# Patient Record
Sex: MALE | Race: BLACK OR AFRICAN AMERICAN | NOT HISPANIC OR LATINO | Employment: OTHER | URBAN - METROPOLITAN AREA
[De-identification: names, ages, dates, MRNs, and addresses within clinical notes are randomized per-mention and may not be internally consistent; named-entity substitution may affect disease eponyms.]

---

## 2020-06-08 ENCOUNTER — OFFICE VISIT (OUTPATIENT)
Dept: PODIATRY | Facility: CLINIC | Age: 80
End: 2020-06-08
Payer: MEDICARE

## 2020-06-08 VITALS
WEIGHT: 171.5 LBS | HEIGHT: 70 IN | SYSTOLIC BLOOD PRESSURE: 173 MMHG | DIASTOLIC BLOOD PRESSURE: 96 MMHG | BODY MASS INDEX: 24.55 KG/M2 | HEART RATE: 63 BPM

## 2020-06-08 DIAGNOSIS — L84 HELOMA MOLLE: ICD-10-CM

## 2020-06-08 DIAGNOSIS — B35.3 TINEA PEDIS OF RIGHT FOOT: Primary | ICD-10-CM

## 2020-06-08 PROCEDURE — 99999 PR PBB SHADOW E&M-NEW PATIENT-LVL III: CPT | Mod: PBBFAC,,, | Performed by: PODIATRIST

## 2020-06-08 PROCEDURE — 99203 PR OFFICE/OUTPT VISIT, NEW, LEVL III, 30-44 MIN: ICD-10-PCS | Mod: S$PBB,,, | Performed by: PODIATRIST

## 2020-06-08 PROCEDURE — 99999 PR PBB SHADOW E&M-NEW PATIENT-LVL III: ICD-10-PCS | Mod: PBBFAC,,, | Performed by: PODIATRIST

## 2020-06-08 PROCEDURE — 99203 OFFICE O/P NEW LOW 30 MIN: CPT | Mod: S$PBB,,, | Performed by: PODIATRIST

## 2020-06-08 PROCEDURE — 99203 OFFICE O/P NEW LOW 30 MIN: CPT | Mod: PBBFAC | Performed by: PODIATRIST

## 2020-06-08 RX ORDER — CARVEDILOL 12.5 MG/1
TABLET ORAL
COMMUNITY
Start: 2020-05-26 | End: 2022-07-27 | Stop reason: SDUPTHER

## 2020-06-08 RX ORDER — SILDENAFIL 100 MG/1
100 TABLET, FILM COATED ORAL
COMMUNITY
Start: 2018-09-08 | End: 2022-12-22

## 2020-06-08 RX ORDER — OLMESARTAN MEDOXOMIL 40 MG/1
20 TABLET ORAL DAILY
COMMUNITY
Start: 2020-04-03

## 2020-06-08 RX ORDER — SPIRONOLACTONE 25 MG/1
TABLET ORAL
COMMUNITY
Start: 2020-05-26 | End: 2022-12-22

## 2020-06-08 RX ORDER — ZOLPIDEM TARTRATE 5 MG/1
TABLET ORAL
COMMUNITY
Start: 2020-05-26 | End: 2023-03-09 | Stop reason: CLARIF

## 2020-06-08 NOTE — PATIENT INSTRUCTIONS
Instructions:  Make sure to dry in between your toes after shower/bath daily    Apply Betadine in between RIGHT THIRD AND FOURTH WEBSPACE ONCE DAILY FOR 7 DAYS    THEN APPLY MICONAZOLE ANTIFUNGAL POWDER every day on bottom of feet and in between toe webspaces to keep dry    WASH YOUR FEET WHEN SHOWERING WITH ANTIBACTERIAL (DIAL) SOAP  Wear cotton socks (80%) or open shoes and sandals.  KEEP YOUR FEET DRY!    Sincerely,  Dr. Charmaine Da Silva        Athletes Foot    Athletes Foot is caused by a fungal infection in the skin. It affects the skin between the toes where it causes fissures (cracks in the skin). It can also affect the bottom of the foot where it causes dry white scales and peeling of the skin. This infection is more likely to occur when the foot is in hot, sweaty socks and shoes for long periods of time.  This infection is treated with skin creams or oral medicine.  Home Care:  · It is important to keep the feet dry. Use absorbent cotton socks and change them if they become sweaty; or wear an open-toe shoe or sandal. Wash the feet at least once a day with soap and water.  · Apply the antifungal cream as prescribed. Some antifungal creams are available without a prescription (Lotrimin, Tinactin).  · It may take a week before the rash starts to improve and it can take about three to four weeks to completely clear. Continue the medicine until the rash is all gone.  · Use over-the-counter antifungal powders or sprays on your feet after exposure to high-risk environments (public showers, gyms and locker rooms) may prevent future infections. You may wish to use appropriate footwear to reduce exposure.  Follow Up  with your doctor as recommended by our staff if the rash is not starting to improve after TEN days of treatment, or if the rash continues to spread.  Get Prompt Medical Attention  if any of the following occur:  · Increasing redness or swelling of the foot  · Pus draining from cracks in the  skin  · Fever of 100.4ºF (38ºC) or higher, or as directed by your healthcare provider  © 0276-4551 [employer], [address] [place] [address], [place] [address]. All rights reserved. This information is not intended as a substitute for professional medical care. Always follow your healthcare professional's instructions.

## 2020-06-08 NOTE — PROGRESS NOTES
[facility]  PODIATRIC MEDICINE AND SURGERY      CHIEF COMPLAINT   Chief Complaint   Patient presents with    Foot Ulcer     Poss. Ulcer in the 3rd web space on the right foot.          HPI:    Edmund Chu is a 79 y.o. male presenting to podiatry clinic with complaint of pain 3rd interspace right foot.  Symptoms have been present for several weeks.  The patient has tried over the counter medications with some success, but the problem is recurrent and aggravating. Patient inquires about available treatment options. No further pedal complaints.      PMH  Past Medical History:   Diagnosis Date    CHF (congestive heart failure)     Hypertension        PROBLEM LIST  There are no active problems to display for this patient.      MEDS  Current Outpatient Medications on File Prior to Visit   Medication Sig Dispense Refill    carvediloL (COREG) 12.5 MG tablet       olmesartan (BENICAR) 40 MG tablet       sildenafiL (VIAGRA) 100 MG tablet Take 100 mg by mouth.      spironolactone (ALDACTONE) 25 MG tablet       zolpidem (AMBIEN) 5 MG Tab        No current facility-administered medications on file prior to visit.        PSH     Past Surgical History:   Procedure Laterality Date    AORTIC VALVE REPLACEMENT  2016    colon section removal          ALL  Review of patient's allergies indicates:   Allergen Reactions    Amlodipine Edema     Ankle    Lactose Other (See Comments)     GI distress    Lisinopril Other (See Comments)    Mefloquine Hives and Itching     Anti malarial         SOC     Social History     Tobacco Use    Smoking status: Former Smoker   Substance Use Topics    Alcohol use: Not on file    Drug use: Not on file         Family HX  No family history on file.         REVIEW OF SYSTEMS  General: Denies any fever or chills  Chest: Denies shortness of breath, wheezing, coughing, or sputum production  Heart: Denies chest pain, cold extremities, orthopenia, or reduced exercise  "tolerance  As noted above and per history of current illness above, otherwise negative in the remainder of the 14 systems.      PHYSICAL EXAM:      Vitals:    06/08/20 1407   BP: (!) 173/96   Pulse: 63   Weight: 77.8 kg (171 lb 8.3 oz)   Height: 5' 10" (1.778 m)       General: This patient is well-developed, well-nourished and appears stated age, well-oriented to person, place and time, and cooperative and pleasant on today's visit    LOWER EXTREMITY PHYSICAL EXAM  VASCULAR  Dorsalis pedis and posterior tibial pulses palpable 2/4 bilaterally.   Capillary refill time immediate to the toes.   Feet are warm to the touch. Skin temperature warm to warm from proximally to distally   There are no ecchymoses noted to bilateral foot and ankle regions.     DERMATOLOGIC  Skin moist with healthy texture and turgor.  There are no open ulcerations, lacerations, or fissures to bilateral foot and ankle regions. There are no signs of infection as there is no erythema, no proximal-extending lymphangiitis, no fluctuance, or crepitus noted on palpation to bilateral foot and ankle regions.   There is RIGHT third and fourth interdigital maceration.   There are hyperkeratotic lesions noted medial aspect fourth digit RIGHT foot.     NEUROLOGIC  Epicritic sensation is intact as the patient is able to sense light touch to bilateral foot and ankle regions.   Achilles and patellar deep tendon reflexes intact  Babinski reflex absent    ORTHOPEDIC/BIOMECHANICAL  No symptomatic structural abnormalities noted  Muscle strength AT/EHL/EDL/PT: 5/5; Achilles/Gastroc/Soleus: 5/5; PB/PL: 5/5 Muscle tone is normal.  Ankle joint ROM INTACT DF/PF, non-crepitus      ASSESSMENT   Tinea pedis of right foot    Heloma molle - Right Foot        PLAN    1. Patient was educated about clinical and imaging findings, and verbalizes understanding of above.  2.For the athletes foot and maceration, the site was lightly debrided with tissue nipper. Gentian violet " applied. Pt instructed to apply betadine between the toes and to the bottoms of feet twice a day. In addition, recommendations were made to spray and disinfect shoes with Lysol and to alternate shoes. Also, if excessive sweating patient was instructed to use Arid Extra Dry spray on the bottom of the feet with Zeasorb powder in the digital interspaces.    3. RTC  for follow up/evaluation as scheduled      Report Electronically Signed By:     Charmaine Da Silva DPM   Podiatry  [facility]  6/8/2020

## 2021-07-15 ENCOUNTER — OFFICE VISIT (OUTPATIENT)
Dept: DERMATOLOGY | Facility: CLINIC | Age: 81
End: 2021-07-15
Payer: MEDICARE

## 2021-07-15 DIAGNOSIS — L30.9 DERMATITIS: Primary | ICD-10-CM

## 2021-07-15 DIAGNOSIS — L82.1 SEBORRHEIC KERATOSES: ICD-10-CM

## 2021-07-15 PROCEDURE — 99999 PR PBB SHADOW E&M-EST. PATIENT-LVL II: CPT | Mod: PBBFAC,,, | Performed by: STUDENT IN AN ORGANIZED HEALTH CARE EDUCATION/TRAINING PROGRAM

## 2021-07-15 PROCEDURE — 99212 OFFICE O/P EST SF 10 MIN: CPT | Mod: PBBFAC | Performed by: STUDENT IN AN ORGANIZED HEALTH CARE EDUCATION/TRAINING PROGRAM

## 2021-07-15 PROCEDURE — 99202 PR OFFICE/OUTPT VISIT, NEW, LEVL II, 15-29 MIN: ICD-10-PCS | Mod: S$PBB,,, | Performed by: STUDENT IN AN ORGANIZED HEALTH CARE EDUCATION/TRAINING PROGRAM

## 2021-07-15 PROCEDURE — 99999 PR PBB SHADOW E&M-EST. PATIENT-LVL II: ICD-10-PCS | Mod: PBBFAC,,, | Performed by: STUDENT IN AN ORGANIZED HEALTH CARE EDUCATION/TRAINING PROGRAM

## 2021-07-15 PROCEDURE — 99202 OFFICE O/P NEW SF 15 MIN: CPT | Mod: S$PBB,,, | Performed by: STUDENT IN AN ORGANIZED HEALTH CARE EDUCATION/TRAINING PROGRAM

## 2021-07-15 RX ORDER — TRIAMCINOLONE ACETONIDE 1 MG/G
CREAM TOPICAL
Qty: 454 G | Refills: 1 | Status: SHIPPED | OUTPATIENT
Start: 2021-07-15 | End: 2022-12-22

## 2022-07-26 DIAGNOSIS — Z76.89 ESTABLISHING CARE WITH NEW DOCTOR, ENCOUNTER FOR: ICD-10-CM

## 2022-07-26 DIAGNOSIS — I50.9 CONGESTIVE HEART FAILURE, UNSPECIFIED HF CHRONICITY, UNSPECIFIED HEART FAILURE TYPE: Primary | ICD-10-CM

## 2022-07-27 ENCOUNTER — HOSPITAL ENCOUNTER (OUTPATIENT)
Dept: CARDIOLOGY | Facility: HOSPITAL | Age: 82
Discharge: HOME | End: 2022-07-27
Attending: STUDENT IN AN ORGANIZED HEALTH CARE EDUCATION/TRAINING PROGRAM
Payer: MEDICARE

## 2022-07-27 ENCOUNTER — OFFICE VISIT (OUTPATIENT)
Dept: CARDIOLOGY | Facility: CLINIC | Age: 82
End: 2022-07-27
Payer: MEDICARE

## 2022-07-27 VITALS
HEART RATE: 81 BPM | HEIGHT: 69 IN | WEIGHT: 163.13 LBS | DIASTOLIC BLOOD PRESSURE: 72 MMHG | BODY MASS INDEX: 24.16 KG/M2 | OXYGEN SATURATION: 99 % | SYSTOLIC BLOOD PRESSURE: 156 MMHG

## 2022-07-27 DIAGNOSIS — I50.9 CONGESTIVE HEART FAILURE, UNSPECIFIED HF CHRONICITY, UNSPECIFIED HEART FAILURE TYPE: ICD-10-CM

## 2022-07-27 DIAGNOSIS — Z85.038 HISTORY OF COLON CANCER: ICD-10-CM

## 2022-07-27 DIAGNOSIS — Z95.2 HISTORY OF AORTIC VALVE REPLACEMENT: ICD-10-CM

## 2022-07-27 DIAGNOSIS — I48.0 PAF (PAROXYSMAL ATRIAL FIBRILLATION): ICD-10-CM

## 2022-07-27 DIAGNOSIS — Z76.89 ESTABLISHING CARE WITH NEW DOCTOR, ENCOUNTER FOR: ICD-10-CM

## 2022-07-27 DIAGNOSIS — I50.9 CONGESTIVE HEART FAILURE, UNSPECIFIED HF CHRONICITY, UNSPECIFIED HEART FAILURE TYPE: Primary | ICD-10-CM

## 2022-07-27 DIAGNOSIS — I10 HYPERTENSION, UNSPECIFIED TYPE: ICD-10-CM

## 2022-07-27 PROCEDURE — 99214 PR OFFICE/OUTPT VISIT, EST, LEVL IV, 30-39 MIN: ICD-10-PCS | Mod: S$PBB,,, | Performed by: STUDENT IN AN ORGANIZED HEALTH CARE EDUCATION/TRAINING PROGRAM

## 2022-07-27 PROCEDURE — 99999 PR PBB SHADOW E&M-EST. PATIENT-LVL III: CPT | Mod: PBBFAC,,, | Performed by: STUDENT IN AN ORGANIZED HEALTH CARE EDUCATION/TRAINING PROGRAM

## 2022-07-27 PROCEDURE — 99214 OFFICE O/P EST MOD 30 MIN: CPT | Mod: S$PBB,,, | Performed by: STUDENT IN AN ORGANIZED HEALTH CARE EDUCATION/TRAINING PROGRAM

## 2022-07-27 PROCEDURE — 99999 PR PBB SHADOW E&M-EST. PATIENT-LVL III: ICD-10-PCS | Mod: PBBFAC,,, | Performed by: STUDENT IN AN ORGANIZED HEALTH CARE EDUCATION/TRAINING PROGRAM

## 2022-07-27 PROCEDURE — 99213 OFFICE O/P EST LOW 20 MIN: CPT | Mod: PBBFAC | Performed by: STUDENT IN AN ORGANIZED HEALTH CARE EDUCATION/TRAINING PROGRAM

## 2022-07-27 RX ORDER — CARVEDILOL 12.5 MG/1
25 TABLET ORAL 2 TIMES DAILY WITH MEALS
Qty: 60 TABLET | Refills: 11 | Status: SHIPPED | OUTPATIENT
Start: 2022-07-27 | End: 2023-03-09

## 2022-07-27 RX ORDER — TORSEMIDE 20 MG/1
20 TABLET ORAL DAILY
COMMUNITY
End: 2022-10-20

## 2022-07-27 RX ORDER — SACUBITRIL AND VALSARTAN 49; 51 MG/1; MG/1
1 TABLET, FILM COATED ORAL 2 TIMES DAILY
COMMUNITY
End: 2022-08-31

## 2022-07-27 RX ORDER — LORAZEPAM 0.5 MG/1
1 TABLET ORAL 2 TIMES DAILY
COMMUNITY
Start: 2022-07-07

## 2022-07-27 NOTE — PROGRESS NOTES
Section of Cardiology                  Cardiac Clinic Note    Chief Complaint/Reason for consultation:  Establish care      HPI:   Edmund Chu is a 81 y.o. male with h/o hypertension, CHF, AVR 2016 ([facility]), h/o colon cancerwho comes in to Cardiology Clinic to establish care.    7/27/2022  Per Care everywhere, last echocardiogram March 2021 with redo use LV function, right heart failure, moderate mitral regurgitation, normal functioning bioprosthetic aortic valve    Comes in with wife   Only coming in for ICD evaluation   Still seeing Dr. Viveros  Has had CHF since 2009- did not want ICD until now    Aortic valve replacement, no aspirin due to mild bruising     Reports heart issues since 2009- had colon cancer s/p resection     Walks regularly, 1.2 miles daily, worsening SOB  Recent orthopnea last few days  Reports leg swelling- recently needed to take torsemide which he doesn't take it regularly   He's a vegetarian, does not use salt  Trying to watch fluid intake     Family hx: cousin: ICD, afib   Stopped smoking at age of 32 yo. ETOH occ          EKG 7/27/2022 NSR, RBBB, LAFB, inferior infarct, qtc 529 ms          ECHO  3/21 (Care everywhere)  CONCLUSIONS:   - Exam indication: Initial evaluation of known cardiomyopathy   - The left ventricle is mildly dilated. There is left ventricular hypertrophy.   Left ventricular systolic function is severely decreased. EF = 26 ± 5% (2D 4-ch.)   Definity contrast used for endocardial border detection.   - The right ventricle is dilated. Right ventricular systolic function is   moderately decreased.   - The left atrial cavity is severely dilated.   - The right atrial cavity is dilated.   - There is moderate (2+ - 3+) mitral valve regurgitation due to restricted leaflet    motion and apical tethering of normal mitral leaflet caused by LV enlargement.   Regurgitant orifice area (PISA) is 0.24 cm².   - Porcine prosthetic aortic valve. There is trace aortic  valve regurgitation. The   peak gradient is 10 mmHg, the mean gradient is 5 mmHg and the dimensionless valve   index is 0.31.   - Estimated right ventricular systolic pressure is 42 mmHg plus right atrial   pressure. Estimated right atrial pressure is not included as the IVC was not seen.   - The patient has not had a prior CC echocardiographic exam for comparison.       STRESS TEST    [facility]      ROS: All 10 systems reviewed. Please refer to the HPI for pertinent positives. All other systems negative.     Past Medical History  Past Medical History:   Diagnosis Date    CHF (congestive heart failure)     Hypertension        Surgical History  Past Surgical History:   Procedure Laterality Date    AORTIC VALVE REPLACEMENT  2016    colon section removal            Allergies:   Review of patient's allergies indicates:   Allergen Reactions    Sacubitril-valsartan Hives and Swelling    Amlodipine Edema     Ankle    Lactose Other (See Comments)     GI distress    Lisinopril Other (See Comments)    Mefloquine Hives and Itching     Anti malarial         Social History:  Social History     Socioeconomic History    Marital status:    Tobacco Use    Smoking status: Former Smoker    Smokeless tobacco: Never Used   Substance and Sexual Activity    Alcohol use: Yes    Drug use: Never       Family History:  family history includes Colon cancer in his father; Hypertension in his mother.    Home Medications:  Current Outpatient Medications on File Prior to Visit   Medication Sig Dispense Refill    olmesartan (BENICAR) 40 MG tablet       torsemide (DEMADEX) 20 MG Tab Take 20 mg by mouth once daily.      [DISCONTINUED] carvediloL (COREG) 12.5 MG tablet       LORazepam (ATIVAN) 0.5 MG tablet Take 0.5 mg by mouth 2 (two) times daily.      rivaroxaban (XARELTO) 20 mg Tab Take 20 mg by mouth daily with dinner or evening meal.      sacubitriL-valsartan (ENTRESTO) 49-51 mg per tablet Take 1 tablet by mouth 2 (two) times  "daily.      sildenafiL (VIAGRA) 100 MG tablet Take 100 mg by mouth.      spironolactone (ALDACTONE) 25 MG tablet       triamcinolone acetonide 0.1% (KENALOG) 0.1 % cream AAA bid (Patient not taking: Reported on 7/27/2022) 454 g 1    zolpidem (AMBIEN) 5 MG Tab        No current facility-administered medications on file prior to visit.       Physical exam:  BP (!) 156/72 (BP Location: Right arm, Patient Position: Sitting, BP Method: Medium (Manual))   Pulse 81   Ht 5' 9" (1.753 m)   Wt 74 kg (163 lb 2.3 oz)   SpO2 99%   BMI 24.09 kg/m²         General: Pt is a 81 y.o. year old male who is AAOx3, in NAD, is pleasant, well nourished, looks stated age  HEENT: PERRL, EOMI, Oral mucosa pink & moist  CVS  No abnormal cardiac pulsations noted on inspection. JVP not raised. The apical impulse is normal on palpation, and is located in the left 5th intercostal space in the mid - clavicular line. No palpable thrills or abnormal pulsations noted. RR, S1 - S2 heard, no murmurs, rubs or gallops appreciated.   PUL : CTA B/L. No wheezes/crackles heard   ABD : BS +, soft. No tenderness elicited   LE : No C/C/E. Distal Pulses palpable B/L         LABS:    Chemistry: No results found for: NA, K, CL, CO2, BUN, CREATININE, GLUCOSE, CALCIUM  Cardiac Markers: No results found for: CKTOTAL, CKMB, CKMBINDEX, TROPONINI  Cardiac Markers (Last 3): No results found for: CKTOTAL, CKMB, CKMBINDEX, TROPONINI  CBC: No results found for: WBC, HGB, HCT, MCV, PLT  Lipids: No results found for: CHOL, TRIG, HDL, LDLDIRECT  Coagulation: No results found for: PT, INR, APTT        Assessment      1. Congestive heart failure, unspecified HF chronicity, unspecified heart failure type    2. Establishing care with new doctor, encounter for    3. Hypertension, unspecified type    4. History of aortic valve replacement    5. History of colon cancer    6. PAF (paroxysmal atrial fibrillation)         Plan:    Congestive heart failure  Systolic heart failure " with right ventricular failure  Reports SOB  Obtain echocardiogram  Continue carvedilol, Benicar, spironolactone  Stopped entresto - due to avoiding over medication  Continue torsemide   Low salt diet, daily weights, low salt diet   Obtain BnP  Needs records from Dr. Viveros (has some records today, will upload)  Refer to Dr. Grider for ICD/CRTD    Aortic valve replacement  Normally function per last echo in 2021  Repeat echocardiogram    ?PAF  Reports history  No longer taking xarelto - reports cardioversion     Hypertension  Elevated  Increase carvedilol 25 mg b.i.d.  Continue Benicar, spironolactone        This note was prepared using voice recognition system and is likely to have sound alike errors that may have been overlooked even after proofreading.     I have reviewed all pertinent chart information.  Plans and recommendations have been formulated under my direct supervision. All questions answered and patient voiced understanding.   If symptoms persist go to the ED.    RTC in 1 month         Tere Johns MD  Cardiology

## 2022-07-29 DIAGNOSIS — I50.9 CONGESTIVE HEART FAILURE, UNSPECIFIED HF CHRONICITY, UNSPECIFIED HEART FAILURE TYPE: Primary | ICD-10-CM

## 2022-08-05 ENCOUNTER — LAB VISIT (OUTPATIENT)
Dept: LAB | Facility: HOSPITAL | Age: 82
End: 2022-08-05
Attending: STUDENT IN AN ORGANIZED HEALTH CARE EDUCATION/TRAINING PROGRAM
Payer: MEDICARE

## 2022-08-05 DIAGNOSIS — I50.9 CONGESTIVE HEART FAILURE, UNSPECIFIED HF CHRONICITY, UNSPECIFIED HEART FAILURE TYPE: ICD-10-CM

## 2022-08-05 LAB
ALBUMIN SERPL BCP-MCNC: 4 G/DL (ref 3.5–5.2)
ALP SERPL-CCNC: 77 U/L (ref 55–135)
ALT SERPL W/O P-5'-P-CCNC: 17 U/L (ref 10–44)
ANION GAP SERPL CALC-SCNC: 10 MMOL/L (ref 8–16)
AST SERPL-CCNC: 25 U/L (ref 10–40)
BILIRUB SERPL-MCNC: 1 MG/DL (ref 0.1–1)
BNP SERPL-MCNC: 1369 PG/ML (ref 0–99)
BUN SERPL-MCNC: 27 MG/DL (ref 8–23)
CALCIUM SERPL-MCNC: 9.6 MG/DL (ref 8.7–10.5)
CHLORIDE SERPL-SCNC: 95 MMOL/L (ref 95–110)
CO2 SERPL-SCNC: 30 MMOL/L (ref 23–29)
CREAT SERPL-MCNC: 1.5 MG/DL (ref 0.5–1.4)
EST. GFR  (NO RACE VARIABLE): 47.1 ML/MIN/1.73 M^2
GLUCOSE SERPL-MCNC: 97 MG/DL (ref 70–110)
POTASSIUM SERPL-SCNC: 4.7 MMOL/L (ref 3.5–5.1)
PROT SERPL-MCNC: 7.5 G/DL (ref 6–8.4)
SODIUM SERPL-SCNC: 135 MMOL/L (ref 136–145)

## 2022-08-05 PROCEDURE — 36415 COLL VENOUS BLD VENIPUNCTURE: CPT | Performed by: STUDENT IN AN ORGANIZED HEALTH CARE EDUCATION/TRAINING PROGRAM

## 2022-08-05 PROCEDURE — 83880 ASSAY OF NATRIURETIC PEPTIDE: CPT | Performed by: STUDENT IN AN ORGANIZED HEALTH CARE EDUCATION/TRAINING PROGRAM

## 2022-08-05 PROCEDURE — 80053 COMPREHEN METABOLIC PANEL: CPT | Performed by: STUDENT IN AN ORGANIZED HEALTH CARE EDUCATION/TRAINING PROGRAM

## 2022-08-09 ENCOUNTER — TELEPHONE (OUTPATIENT)
Dept: CARDIOLOGY | Facility: CLINIC | Age: 82
End: 2022-08-09
Payer: MEDICARE

## 2022-08-09 DIAGNOSIS — I10 HYPERTENSION, UNSPECIFIED TYPE: Primary | ICD-10-CM

## 2022-08-09 DIAGNOSIS — I50.9 CONGESTIVE HEART FAILURE, UNSPECIFIED HF CHRONICITY, UNSPECIFIED HEART FAILURE TYPE: ICD-10-CM

## 2022-08-09 NOTE — TELEPHONE ENCOUNTER
Spoke with patient gave lab work and scheduled lab work. Pt verbalized understanding.     ----- Message from Tere Johns MD sent at 8/8/2022  7:04 AM CDT -----  Call patient  Fluid level is coming down  Potassium is improving  Continue torsemide 2 tablets francisco  Recheck CMP and BNP around 8/17

## 2022-08-17 ENCOUNTER — LAB VISIT (OUTPATIENT)
Dept: LAB | Facility: HOSPITAL | Age: 82
End: 2022-08-17
Attending: STUDENT IN AN ORGANIZED HEALTH CARE EDUCATION/TRAINING PROGRAM
Payer: MEDICARE

## 2022-08-17 DIAGNOSIS — I10 HYPERTENSION, UNSPECIFIED TYPE: ICD-10-CM

## 2022-08-17 DIAGNOSIS — I50.9 CONGESTIVE HEART FAILURE, UNSPECIFIED HF CHRONICITY, UNSPECIFIED HEART FAILURE TYPE: ICD-10-CM

## 2022-08-17 LAB
ALBUMIN SERPL BCP-MCNC: 4 G/DL (ref 3.5–5.2)
ALP SERPL-CCNC: 83 U/L (ref 55–135)
ALT SERPL W/O P-5'-P-CCNC: 20 U/L (ref 10–44)
ANION GAP SERPL CALC-SCNC: 12 MMOL/L (ref 8–16)
AST SERPL-CCNC: 28 U/L (ref 10–40)
BILIRUB SERPL-MCNC: 1 MG/DL (ref 0.1–1)
BNP SERPL-MCNC: 1441 PG/ML (ref 0–99)
BUN SERPL-MCNC: 25 MG/DL (ref 8–23)
CALCIUM SERPL-MCNC: 10 MG/DL (ref 8.7–10.5)
CHLORIDE SERPL-SCNC: 98 MMOL/L (ref 95–110)
CO2 SERPL-SCNC: 28 MMOL/L (ref 23–29)
CREAT SERPL-MCNC: 1.6 MG/DL (ref 0.5–1.4)
EST. GFR  (NO RACE VARIABLE): 43.6 ML/MIN/1.73 M^2
GLUCOSE SERPL-MCNC: 77 MG/DL (ref 70–110)
POTASSIUM SERPL-SCNC: 4.7 MMOL/L (ref 3.5–5.1)
PROT SERPL-MCNC: 8 G/DL (ref 6–8.4)
SODIUM SERPL-SCNC: 138 MMOL/L (ref 136–145)

## 2022-08-17 PROCEDURE — 83880 ASSAY OF NATRIURETIC PEPTIDE: CPT | Performed by: STUDENT IN AN ORGANIZED HEALTH CARE EDUCATION/TRAINING PROGRAM

## 2022-08-17 PROCEDURE — 80053 COMPREHEN METABOLIC PANEL: CPT | Performed by: STUDENT IN AN ORGANIZED HEALTH CARE EDUCATION/TRAINING PROGRAM

## 2022-08-17 PROCEDURE — 36415 COLL VENOUS BLD VENIPUNCTURE: CPT | Mod: PO | Performed by: STUDENT IN AN ORGANIZED HEALTH CARE EDUCATION/TRAINING PROGRAM

## 2022-08-19 ENCOUNTER — TELEPHONE (OUTPATIENT)
Dept: CARDIOLOGY | Facility: CLINIC | Age: 82
End: 2022-08-19
Payer: MEDICARE

## 2022-08-19 NOTE — TELEPHONE ENCOUNTER
Spoke with patient gave lab results. He still has some SOB bending over to pick something up. He also states he is able to walk further.      ----- Message from Tere Johns MD sent at 8/19/2022  6:58 AM CDT -----  Call patient  Fluid level is mildly up compared to prior   Other labs are fine  Please ask him if symptoms have improved, stabilized, or worsened

## 2022-08-19 NOTE — PROGRESS NOTES
Call patient  Fluid level is mildly up compared to prior   Other labs are fine  Please ask him if symptoms have improved, stabilized, or worsened

## 2022-08-31 ENCOUNTER — OFFICE VISIT (OUTPATIENT)
Dept: CARDIOLOGY | Facility: CLINIC | Age: 82
End: 2022-08-31
Payer: MEDICARE

## 2022-08-31 ENCOUNTER — HOSPITAL ENCOUNTER (OUTPATIENT)
Dept: CARDIOLOGY | Facility: HOSPITAL | Age: 82
Discharge: HOME | End: 2022-08-31
Attending: STUDENT IN AN ORGANIZED HEALTH CARE EDUCATION/TRAINING PROGRAM
Payer: MEDICARE

## 2022-08-31 VITALS
HEART RATE: 70 BPM | SYSTOLIC BLOOD PRESSURE: 140 MMHG | WEIGHT: 157.63 LBS | OXYGEN SATURATION: 99 % | DIASTOLIC BLOOD PRESSURE: 80 MMHG | HEIGHT: 69 IN | BODY MASS INDEX: 23.35 KG/M2

## 2022-08-31 DIAGNOSIS — I10 WHITE COAT SYNDROME WITH DIAGNOSIS OF HYPERTENSION: ICD-10-CM

## 2022-08-31 DIAGNOSIS — I50.42 CHRONIC COMBINED SYSTOLIC AND DIASTOLIC CONGESTIVE HEART FAILURE: ICD-10-CM

## 2022-08-31 DIAGNOSIS — I10 HYPERTENSION, UNSPECIFIED TYPE: Primary | ICD-10-CM

## 2022-08-31 DIAGNOSIS — I48.0 PAF (PAROXYSMAL ATRIAL FIBRILLATION): ICD-10-CM

## 2022-08-31 DIAGNOSIS — I25.10 CORONARY ARTERY DISEASE INVOLVING NATIVE CORONARY ARTERY OF NATIVE HEART WITHOUT ANGINA PECTORIS: ICD-10-CM

## 2022-08-31 DIAGNOSIS — Z95.2 HISTORY OF AORTIC VALVE REPLACEMENT: ICD-10-CM

## 2022-08-31 DIAGNOSIS — I10 HYPERTENSION, UNSPECIFIED TYPE: ICD-10-CM

## 2022-08-31 PROCEDURE — 99999 PR PBB SHADOW E&M-EST. PATIENT-LVL IV: CPT | Mod: PBBFAC,,, | Performed by: STUDENT IN AN ORGANIZED HEALTH CARE EDUCATION/TRAINING PROGRAM

## 2022-08-31 PROCEDURE — 93010 EKG 12-LEAD: ICD-10-PCS | Mod: ,,, | Performed by: INTERNAL MEDICINE

## 2022-08-31 PROCEDURE — 99214 PR OFFICE/OUTPT VISIT, EST, LEVL IV, 30-39 MIN: ICD-10-PCS | Mod: S$PBB,,, | Performed by: STUDENT IN AN ORGANIZED HEALTH CARE EDUCATION/TRAINING PROGRAM

## 2022-08-31 PROCEDURE — 99214 OFFICE O/P EST MOD 30 MIN: CPT | Mod: PBBFAC | Performed by: STUDENT IN AN ORGANIZED HEALTH CARE EDUCATION/TRAINING PROGRAM

## 2022-08-31 PROCEDURE — 99999 PR PBB SHADOW E&M-EST. PATIENT-LVL IV: ICD-10-PCS | Mod: PBBFAC,,, | Performed by: STUDENT IN AN ORGANIZED HEALTH CARE EDUCATION/TRAINING PROGRAM

## 2022-08-31 PROCEDURE — 93005 ELECTROCARDIOGRAM TRACING: CPT

## 2022-08-31 PROCEDURE — 99214 OFFICE O/P EST MOD 30 MIN: CPT | Mod: S$PBB,,, | Performed by: STUDENT IN AN ORGANIZED HEALTH CARE EDUCATION/TRAINING PROGRAM

## 2022-08-31 PROCEDURE — 93010 ELECTROCARDIOGRAM REPORT: CPT | Mod: ,,, | Performed by: INTERNAL MEDICINE

## 2022-08-31 NOTE — PROGRESS NOTES
Section of Cardiology                  Cardiac Clinic Note    Chief Complaint/Reason for consultation:  Establish care      HPI:   Edmund Chu is a 81 y.o. male with h/o hypertension, CHF, AVR 2016 ([facility]), h/o colon cancerwho comes in to Cardiology Clinic to establish care.    7/27/2022  Per Care everywhere, last echocardiogram March 2021 with redo use LV function, right heart failure, moderate mitral regurgitation, normal functioning bioprosthetic aortic valve    Comes in with wife   Only coming in for ICD evaluation   Still seeing Dr. Viveros  Has had CHF since 2009- did not want ICD until now    Aortic valve replacement, no aspirin due to mild bruising     Reports heart issues since 2009- had colon cancer s/p resection     Walks regularly, 1.2 miles daily, worsening SOB  Recent orthopnea last few days  Reports leg swelling- recently needed to take torsemide which he doesn't take it regularly   He's a vegetarian, does not use salt  Trying to watch fluid intake     Family hx: cousin: ICD, afib   Stopped smoking at age of 32 yo. ETOH occ    8/31/22  BNP level was elevated, torsemide 100 mg daily increased to 200 mg daily at the time  Repeat BNP level was elevated, CMP showed stable renal function  Patient now back down to 100 mg   Dropped 6lbs since last visit  Reports daily weights, fluid and salt restriction  Has not taken Xarelto inferior of bleeding if he cuts himself  Blood pressure readings normotensive at home ranges a 110 to 135 systolic    Denies chest pain, shortness of breath, dizziness, palpitations, orthopnea, PND        EKG 8/31/2022 NSR, RBBB, LAFB, PVCs,  ms   EKG 7/27/2022 NSR, RBBB, LAFB, inferior infarct, qtc 529 ms          Prior records  Stress test 12/2020 small reversible defect, apical segment.  Medium perfusion defect inferior wall  LHC 2/21:  Left main 20% stenosis   Lad:  Mid LAD 50% stenosis   Left circumflex: Diffuse irregularity  Mid RCA proximal portion  50% stenosis  Distal RCA 50% stenosis   Ramus diffuse irregularity  Echo 11/21 EF 30%, RV function is severely decreased        ECHO  3/21 (Care everywhere)  CONCLUSIONS:   - Exam indication: Initial evaluation of known cardiomyopathy   - The left ventricle is mildly dilated. There is left ventricular hypertrophy.   Left ventricular systolic function is severely decreased. EF = 26 ± 5% (2D 4-ch.)   Definity contrast used for endocardial border detection.   - The right ventricle is dilated. Right ventricular systolic function is   moderately decreased.   - The left atrial cavity is severely dilated.   - The right atrial cavity is dilated.   - There is moderate (2+ - 3+) mitral valve regurgitation due to restricted leaflet    motion and apical tethering of normal mitral leaflet caused by LV enlargement.   Regurgitant orifice area (PISA) is 0.24 cm².   - Porcine prosthetic aortic valve. There is trace aortic valve regurgitation. The   peak gradient is 10 mmHg, the mean gradient is 5 mmHg and the dimensionless valve   index is 0.31.   - Estimated right ventricular systolic pressure is 42 mmHg plus right atrial   pressure. Estimated right atrial pressure is not included as the IVC was not seen.   - The patient has not had a prior CC echocardiographic exam for comparison.       STRESS TEST    [facility]      ROS: All 10 systems reviewed. Please refer to the HPI for pertinent positives. All other systems negative.     Past Medical History  Past Medical History:   Diagnosis Date    CHF (congestive heart failure)     Hypertension        Surgical History  Past Surgical History:   Procedure Laterality Date    AORTIC VALVE REPLACEMENT  2016    colon section removal            Allergies:   Review of patient's allergies indicates:   Allergen Reactions    Sacubitril-valsartan Hives and Swelling    Amlodipine Edema     Ankle    Lactose Other (See Comments)     GI distress    Lisinopril Other (See Comments)    Mefloquine Hives and Itching  "    Anti malarial         Social History:  Social History     Socioeconomic History    Marital status:    Tobacco Use    Smoking status: Former    Smokeless tobacco: Never   Substance and Sexual Activity    Alcohol use: Yes    Drug use: Never       Family History:  family history includes Colon cancer in his father; Hypertension in his mother.    Home Medications:  Current Outpatient Medications on File Prior to Visit   Medication Sig Dispense Refill    carvediloL (COREG) 12.5 MG tablet Take 2 tablets (25 mg total) by mouth 2 (two) times daily with meals. 60 tablet 11    LORazepam (ATIVAN) 0.5 MG tablet Take 0.5 mg by mouth 2 (two) times daily.      olmesartan (BENICAR) 40 MG tablet Take 20 mg by mouth once daily.      sildenafiL (VIAGRA) 100 MG tablet Take 100 mg by mouth.      torsemide (DEMADEX) 20 MG Tab Take 20 mg by mouth once daily.      triamcinolone acetonide 0.1% (KENALOG) 0.1 % cream AAA bid 454 g 1    zolpidem (AMBIEN) 5 MG Tab       rivaroxaban (XARELTO) 20 mg Tab Take 20 mg by mouth daily with dinner or evening meal.      sacubitriL-valsartan (ENTRESTO) 49-51 mg per tablet Take 1 tablet by mouth 2 (two) times daily.      spironolactone (ALDACTONE) 25 MG tablet        No current facility-administered medications on file prior to visit.       Physical exam:  BP (!) 140/80 (BP Location: Left arm, Patient Position: Sitting, BP Method: Medium (Manual))   Pulse 70   Ht 5' 9" (1.753 m)   Wt 71.5 kg (157 lb 10.1 oz)   SpO2 99%   BMI 23.28 kg/m²         General: Pt is a 81 y.o. year old male who is AAOx3, in NAD, is pleasant, well nourished, looks stated age  HEENT: PERRL, EOMI, Oral mucosa pink & moist  CVS  No abnormal cardiac pulsations noted on inspection. JVP not raised. The apical impulse is normal on palpation, and is located in the left 5th intercostal space in the mid - clavicular line. No palpable thrills or abnormal pulsations noted. RR, S1 - S2 heard, no murmurs, rubs or gallops " appreciated.   PUL : CTA B/L. No wheezes/crackles heard   ABD : BS +, soft. No tenderness elicited   LE : No C/C/E. Distal Pulses palpable B/L         LABS:    Chemistry:   Lab Results   Component Value Date     08/17/2022    K 4.7 08/17/2022    CL 98 08/17/2022    CO2 28 08/17/2022    BUN 25 (H) 08/17/2022    CREATININE 1.6 (H) 08/17/2022    CALCIUM 10.0 08/17/2022     Cardiac Markers: No results found for: CKTOTAL, CKMB, CKMBINDEX, TROPONINI  Cardiac Markers (Last 3): No results found for: CKTOTAL, CKMB, CKMBINDEX, TROPONINI  CBC: No results found for: WBC, HGB, HCT, MCV, PLT  Lipids: No results found for: CHOL, TRIG, HDL, LDLDIRECT  Coagulation: No results found for: PT, INR, APTT        Assessment      1. Hypertension, unspecified type    2. Chronic combined systolic and diastolic congestive heart failure    3. History of aortic valve replacement    4. PAF (paroxysmal atrial fibrillation)           Plan:    Congestive heart failure  NYHA class III, stage C  Systolic heart failure with right ventricular failure  Reports SOB  Obtain echocardiogram  Continue carvedilol, Benicar, spironolactone  Stopped entresto - due to avoiding over medication  Continue torsemide   Low salt diet, daily weights, low salt diet   Obtain BnP  Needs records from Dr. Viveros (has some records today, will upload)  Refer to Dr. Grider for ICD/CRTD    Aortic valve replacement  Normally function per last echo in 2021  Repeat echocardiogram pending    PAF  Reports history  Would consider restarting Xarelto    Hypertension  Elevated- normotensive reading at home  Enroll into digital medicine program  Continue carvedilol 25 mg b.i.d.  Continue Benicar, spironolactone  (says Entresto had side effects)      This note was prepared using voice recognition system and is likely to have sound alike errors that may have been overlooked even after proofreading.     I have reviewed all pertinent chart information.  Plans and recommendations  have been formulated under my direct supervision. All questions answered and patient voiced understanding.   If symptoms persist go to the ED.    RTC in 1 month         Tere Johns MD  Cardiology

## 2022-09-07 ENCOUNTER — TELEPHONE (OUTPATIENT)
Dept: CARDIOLOGY | Facility: CLINIC | Age: 82
End: 2022-09-07
Payer: MEDICARE

## 2022-09-07 NOTE — TELEPHONE ENCOUNTER
JUDSONM to return call for lab results.    ----- Message from Tere Johns MD sent at 9/2/2022  7:51 PM CDT -----  Call patient  Kidney function shows mild dehydration  Take torsemide 1 tablet every other day (currently taking 1 daily)  If becomes short of breath, can take torsemide 1 tablet daily for 3 consecutive days. Once shortness of breath improves, go back to taking 1 tablet every other day

## 2022-09-07 NOTE — TELEPHONE ENCOUNTER
Verbalized understanding of instructions to take torsemide every other day, and if he becomes SOB take for 3 days then every other day.

## 2022-09-26 ENCOUNTER — HOSPITAL ENCOUNTER (OUTPATIENT)
Dept: RADIOLOGY | Facility: HOSPITAL | Age: 82
Discharge: HOME | End: 2022-09-26
Attending: INTERNAL MEDICINE
Payer: MEDICARE

## 2022-09-26 ENCOUNTER — OFFICE VISIT (OUTPATIENT)
Dept: CARDIOLOGY | Facility: CLINIC | Age: 82
End: 2022-09-26
Payer: MEDICARE

## 2022-09-26 ENCOUNTER — TELEPHONE (OUTPATIENT)
Dept: DERMATOLOGY | Facility: CLINIC | Age: 82
End: 2022-09-26
Payer: MEDICARE

## 2022-09-26 VITALS
OXYGEN SATURATION: 98 % | BODY MASS INDEX: 24.29 KG/M2 | DIASTOLIC BLOOD PRESSURE: 100 MMHG | WEIGHT: 164 LBS | HEART RATE: 77 BPM | HEIGHT: 69 IN | SYSTOLIC BLOOD PRESSURE: 148 MMHG

## 2022-09-26 DIAGNOSIS — I10 HYPERTENSION, UNSPECIFIED TYPE: ICD-10-CM

## 2022-09-26 DIAGNOSIS — I50.22 CHRONIC SYSTOLIC CONGESTIVE HEART FAILURE: Primary | ICD-10-CM

## 2022-09-26 DIAGNOSIS — I50.22 CHRONIC SYSTOLIC CONGESTIVE HEART FAILURE: ICD-10-CM

## 2022-09-26 DIAGNOSIS — Z95.2 S/P AVR (AORTIC VALVE REPLACEMENT): ICD-10-CM

## 2022-09-26 DIAGNOSIS — I50.42 CHRONIC COMBINED SYSTOLIC AND DIASTOLIC CONGESTIVE HEART FAILURE: Primary | ICD-10-CM

## 2022-09-26 DIAGNOSIS — I48.0 PAF (PAROXYSMAL ATRIAL FIBRILLATION): ICD-10-CM

## 2022-09-26 DIAGNOSIS — I50.9 CONGESTIVE HEART FAILURE, UNSPECIFIED HF CHRONICITY, UNSPECIFIED HEART FAILURE TYPE: ICD-10-CM

## 2022-09-26 DIAGNOSIS — I50.9 CONGESTIVE HEART FAILURE, UNSPECIFIED HF CHRONICITY, UNSPECIFIED HEART FAILURE TYPE: Primary | ICD-10-CM

## 2022-09-26 DIAGNOSIS — I45.2 RBBB PLUS LA HEMIBLOCK: ICD-10-CM

## 2022-09-26 PROCEDURE — 71046 X-RAY EXAM CHEST 2 VIEWS: CPT | Mod: TC

## 2022-09-26 PROCEDURE — 99214 OFFICE O/P EST MOD 30 MIN: CPT | Mod: PBBFAC,25 | Performed by: INTERNAL MEDICINE

## 2022-09-26 PROCEDURE — 99999 PR PBB SHADOW E&M-EST. PATIENT-LVL IV: ICD-10-PCS | Mod: PBBFAC,,, | Performed by: INTERNAL MEDICINE

## 2022-09-26 PROCEDURE — 99205 OFFICE O/P NEW HI 60 MIN: CPT | Mod: S$PBB,,, | Performed by: INTERNAL MEDICINE

## 2022-09-26 PROCEDURE — 99205 PR OFFICE/OUTPT VISIT, NEW, LEVL V, 60-74 MIN: ICD-10-PCS | Mod: S$PBB,,, | Performed by: INTERNAL MEDICINE

## 2022-09-26 PROCEDURE — 71046 X-RAY EXAM CHEST 2 VIEWS: CPT | Mod: 26,,, | Performed by: RADIOLOGY

## 2022-09-26 PROCEDURE — 99999 PR PBB SHADOW E&M-EST. PATIENT-LVL IV: CPT | Mod: PBBFAC,,, | Performed by: INTERNAL MEDICINE

## 2022-09-26 PROCEDURE — 71046 XR CHEST PA AND LATERAL: ICD-10-PCS | Mod: 26,,, | Performed by: RADIOLOGY

## 2022-09-26 RX ORDER — ASPIRIN 325 MG
50 TABLET, DELAYED RELEASE (ENTERIC COATED) ORAL DAILY
COMMUNITY
End: 2022-12-22

## 2022-09-26 RX ORDER — DAPAGLIFLOZIN 10 MG/1
10 TABLET, FILM COATED ORAL DAILY
Qty: 30 TABLET | Refills: 6 | Status: SHIPPED | OUTPATIENT
Start: 2022-09-26 | End: 2022-10-26

## 2022-09-26 NOTE — TELEPHONE ENCOUNTER
----- Message from Nely Kendrick sent at 9/26/2022 11:10 AM CDT -----  .Type:  Same Day Appointment Request    Caller is requesting a same day appointment.  Caller declined first available appointment listed below.    Name of Caller:Zion Chu   When is the first available appointment?10/10/2022  Symptoms: extensive itching  Best Call Back Number:.[phone]   Additional Information:

## 2022-09-26 NOTE — PROGRESS NOTES
Subjective:   Patient ID:  Edmund Chu is a 81 y.o. male     Chief complaint:    HPI  New patient to me.  Referred by Dr Johns for evaluation and management of CHF/ICD/PAF   --   Background as gleaned from patient's records and today's interview :  81 y.o. male with h/o hypertension, CHF, AVR 2016 ([facility]), h/o colon cancer   Has had CHF since 2009- did not want ICD until now   Aortic valve replacement, no aspirin due to mild bruising   Echo from [facility] in jan 2022:  Final Impressions   1. Moderately enlarged left ventricular chamber size, moderate generalized hypokinesis,   calculated 2-D biplane volumetric ejection fraction 29%.   2. Abnormal left ventricular geometry with  eccentric left ventricular hypertrophy.   3. Moderately enlarged right ventricular chamber size, moderate-severely reduced systolic   function, estimated right ventricular systolic pressure 58 mmHg (right atrial pressure of 15   mmHg).   4. Status post 23 mm Freire II porcine aortic valve prosthesis. Mean gradient; 12 mmHg.   Trivial prosthetic and periprosthetic regurgitation.   5. Mild-moderate tricuspid valve regurgitation.   6. Normal mid ascending aorta diameter (diameter 32 mm at mid level).   7. Mildly enlarged inferior vena cava size with reduced inspiratory collapse (<50%).   8. Compared to the report of 05/16/2015 no significant change has occurred.    [facility] 2/21:  Left main 20% stenosis   Lad:  Mid LAD 50% stenosis   Left circumflex: Diffuse irregularity  Mid RCA proximal portion 50% stenosis  Distal RCA 50% stenosis   Ramus diffuse irregularity    PFTs:  3/7/2017  5:20 PM   Spirometry is abnormal. The forced vital capacity is mildly   reduced. The flows are abnormal. After bronchodilator there was   significant improvement in the FEV1. The total lung capacity is   mildly reduced. Diffusion is normal.Mixed obstructive restrictive   impairment of a mild to moderate degree. There is reversibility     Latest Reference  Range & Units 07/27/22 10:04 08/05/22 12:14 08/17/22 12:33   BNP 0 - 99 pg/mL 2,819 (H) 1,369 (H) 1,441 (H)      Latest Reference Range & Units 08/31/22 09:39   Sodium 136 - 145 mmol/L 137   Potassium 3.5 - 5.1 mmol/L 4.5   Chloride 95 - 110 mmol/L 99   CO2 23 - 29 mmol/L 27   Anion Gap 8 - 16 mmol/L 11   BUN 8 - 23 mg/dL 34 (H)   Creatinine 0.5 - 1.4 mg/dL 1.6 (H)   eGFR >60 mL/min/1.73 m^2 43.6 !   Glucose 70 - 110 mg/dL 92   Calcium 8.7 - 10.5 mg/dL 10.1   Alkaline Phosphatase 55 - 135 U/L 76   PROTEIN TOTAL 6.0 - 8.4 g/dL 8.0   Albumin 3.5 - 5.2 g/dL 4.0   BILIRUBIN TOTAL 0.1 - 1.0 mg/dL 1.0   AST 10 - 40 U/L 28   ALT 10 - 44 U/L 19     Dr Johns has been adjusting his CHF medications including diuretics.  His symptoms of dyspnea and orthopnea have somewhat abated.    He tried Entresto and he says that he was allergic to it (itching around the eyes). However, he is on Benicar - he does have a rash on his back.   His BPs at home were rather soft in jude  Last week 102/61 to 136/81.     Has had a DCCV in 3/2021  Has been with worse CHF Sx since 10/2021.     Current Outpatient Medications   Medication Sig    carvediloL (COREG) 12.5 MG tablet Take 2 tablets (25 mg total) by mouth 2 (two) times daily with meals.    co-enzyme Q-10 50 mg capsule Take 50 mg by mouth once daily.    LORazepam (ATIVAN) 0.5 MG tablet Take 0.5 mg by mouth 2 (two) times daily.    olmesartan (BENICAR) 40 MG tablet Take 20 mg by mouth once daily.    rivaroxaban (XARELTO) 20 mg Tab Take 20 mg by mouth daily with dinner or evening meal.    sildenafiL (VIAGRA) 100 MG tablet Take 100 mg by mouth.    spironolactone (ALDACTONE) 25 MG tablet     triamcinolone acetonide 0.1% (KENALOG) 0.1 % cream AAA bid    tumeric-ging-olive-oreg-capryl 100 mg-150 mg- 50 mg-150 mg Cap Take by mouth.    zolpidem (AMBIEN) 5 MG Tab     dapagliflozin (FARXIGA) 10 mg tablet Take 1 tablet (10 mg total) by mouth once daily.    torsemide (DEMADEX) 20 MG Tab Take 20 mg by mouth  once daily.     No current facility-administered medications for this visit.     Review of Systems   Constitutional: Positive for malaise/fatigue and weight gain. Negative for decreased appetite and weight loss.   Eyes:  Negative for blurred vision.   Cardiovascular:  Positive for dyspnea on exertion and leg swelling. Negative for chest pain, claudication, cyanosis, irregular heartbeat, near-syncope, orthopnea and palpitations.   Respiratory:  Positive for cough and sputum production. Negative for shortness of breath, sleep disturbances due to breathing, snoring and wheezing.    Endocrine: Negative for heat intolerance.   Hematologic/Lymphatic: Does not bruise/bleed easily.   Musculoskeletal:  Positive for arthritis, joint pain, muscle cramps and muscle weakness. Negative for myalgias.   Gastrointestinal:  Negative for melena, nausea and vomiting.   Genitourinary:  Negative for nocturia.   Neurological:  Positive for excessive daytime sleepiness, focal weakness, light-headedness, loss of balance, numbness and weakness. Negative for dizziness and headaches.   Psychiatric/Behavioral:  Negative for depression, memory loss and substance abuse. The patient does not have insomnia and is not nervous/anxious.        Social History     Tobacco Use   Smoking Status Former   Smokeless Tobacco Never       reports current alcohol use.   Past Medical History:   Diagnosis Date    CHF (congestive heart failure)     Hypertension      Family History   Problem Relation Age of Onset    Hypertension Mother     Colon cancer Father      Social History     Socioeconomic History    Marital status:    Tobacco Use    Smoking status: Former    Smokeless tobacco: Never   Substance and Sexual Activity    Alcohol use: Yes    Drug use: Never     Past Surgical History:   Procedure Laterality Date    AORTIC VALVE REPLACEMENT  2016    colon section removal         Objective:   Physical Exam  Vitals and nursing note reviewed.   Constitutional:        Appearance: Normal appearance. He is well-developed. He is not diaphoretic.   HENT:      Head: Normocephalic and atraumatic.      Right Ear: External ear normal.      Left Ear: External ear normal.   Eyes:      General:         Right eye: No discharge.         Left eye: No discharge.      Conjunctiva/sclera: Conjunctivae normal.      Right eye: Right conjunctiva is not injected.      Left eye: No hemorrhage.     Pupils: Pupils are equal, round, and reactive to light.   Neck:      Thyroid: No thyromegaly.      Vascular: No JVD.   Cardiovascular:      Rate and Rhythm: Normal rate and regular rhythm.      Chest Wall: PMI is not displaced.      Pulses: Intact distal pulses.           Carotid pulses are 2+ on the right side and 2+ on the left side.       Radial pulses are 2+ on the right side and 2+ on the left side.        Dorsalis pedis pulses are 2+ on the right side and 2+ on the left side.        Posterior tibial pulses are 2+ on the right side and 2+ on the left side.      Heart sounds: Normal heart sounds. No midsystolic click and no opening snap. No murmur heard.    No friction rub. No gallop.   Pulmonary:      Effort: Pulmonary effort is normal. No respiratory distress.      Breath sounds: Normal breath sounds. No wheezing or rales.   Chest:      Chest wall: No tenderness.   Abdominal:      Palpations: Abdomen is soft. There is no hepatomegaly.      Tenderness: There is no abdominal tenderness. There is no guarding or rebound.   Musculoskeletal:         General: No tenderness. Normal range of motion.      Cervical back: Neck supple.      Right knee: No swelling.      Left knee: No swelling.      Right lower leg: No swelling.      Left lower leg: No swelling.      Right ankle: No swelling.      Left ankle: No swelling.      Right foot: No swelling.      Left foot: No swelling.   Skin:     General: Skin is warm and dry.      Findings: No rash.             Comments: Punctate rash over the left side of his  "back.   Neurological:      Mental Status: He is alert and oriented to person, place, and time.      Cranial Nerves: No cranial nerve deficit.      Coordination: Coordination normal.      Deep Tendon Reflexes: Reflexes are normal and symmetric.   Psychiatric:         Behavior: Behavior normal.     BP (!) 148/100   Pulse 77   Ht 5' 9" (1.753 m)   Wt 74.4 kg (164 lb 0.4 oz)   SpO2 98%   BMI 24.22 kg/m²      Assessment:    He is a good candidate in theory for ICD implantation.  I am not sure how decreased his is functionality.  He seems to me to be closer to class 3 than class 2.  Still, there may be a statistical benefit the having an ICD implant.  Also, he is probably not on maximum tolerable medical therapy.  Will try to advance this.    1. Chronic systolic congestive heart failure    2. RBBB plus LA hemiblock    3. S/P AVR (aortic valve replacement)    4. Hypertension, unspecified type    5. Congestive heart failure, unspecified HF chronicity, unspecified heart failure type        Plan:    Add tissue Doppler indices to echocardiogram.  Review and decide whether CRT may be helpful.  If he has significant dyssynchrony, we may consider CRT.  If not, we may consider other heart failure devices such as Barostim or CCM.  6 minute walk now  Chest x-ray PA and lateral.  Start the PEG low fluids in 10 mg per day.  Obtain BMP in 1 week.  Patient has the rash on his back and may be drug related.  Will refer to dermatology for evaluation.    Patient is supposed to have an allergic reaction to ARB.  Will refer to allergy because we are interested in starting Entresto if at all possible.    I have discussed the ICD implant procedure in detail with the patient. I described its benefits and risks. I reviewed alternative therapies and discussed their potential value. The patient was given ample opportunity to express concerns and ask questions and I provided appropriate responses and  answers to such.The patient understands and " agrees to proceed.  Consent form was signed today by patient and myself and appropriately witnessed.     No orders of the defined types were placed in this encounter.    Follow up if symptoms worsen or fail to improve, for post work up.  There are no discontinued medications.  Outpatient Encounter Medications as of 9/26/2022   Medication Sig Dispense Refill    carvediloL (COREG) 12.5 MG tablet Take 2 tablets (25 mg total) by mouth 2 (two) times daily with meals. 60 tablet 11    co-enzyme Q-10 50 mg capsule Take 50 mg by mouth once daily.      LORazepam (ATIVAN) 0.5 MG tablet Take 0.5 mg by mouth 2 (two) times daily.      olmesartan (BENICAR) 40 MG tablet Take 20 mg by mouth once daily.      rivaroxaban (XARELTO) 20 mg Tab Take 20 mg by mouth daily with dinner or evening meal.      sildenafiL (VIAGRA) 100 MG tablet Take 100 mg by mouth.      spironolactone (ALDACTONE) 25 MG tablet       triamcinolone acetonide 0.1% (KENALOG) 0.1 % cream AAA bid 454 g 1    tumeric-ging-olive-oreg-capryl 100 mg-150 mg- 50 mg-150 mg Cap Take by mouth.      zolpidem (AMBIEN) 5 MG Tab       torsemide (DEMADEX) 20 MG Tab Take 20 mg by mouth once daily.       No facility-administered encounter medications on file as of 9/26/2022.     Medication List with Changes/Refills   New Medications    DAPAGLIFLOZIN (FARXIGA) 10 MG TABLET    Take 1 tablet (10 mg total) by mouth once daily.   Current Medications    CARVEDILOL (COREG) 12.5 MG TABLET    Take 2 tablets (25 mg total) by mouth 2 (two) times daily with meals.    CO-ENZYME Q-10 50 MG CAPSULE    Take 50 mg by mouth once daily.    LORAZEPAM (ATIVAN) 0.5 MG TABLET    Take 0.5 mg by mouth 2 (two) times daily.    OLMESARTAN (BENICAR) 40 MG TABLET    Take 20 mg by mouth once daily.    RIVAROXABAN (XARELTO) 20 MG TAB    Take 20 mg by mouth daily with dinner or evening meal.    SILDENAFIL (VIAGRA) 100 MG TABLET    Take 100 mg by mouth.    SPIRONOLACTONE (ALDACTONE) 25 MG TABLET        TORSEMIDE  (DEMADEX) 20 MG TAB    Take 20 mg by mouth once daily.    TRIAMCINOLONE ACETONIDE 0.1% (KENALOG) 0.1 % CREAM    AAA bid    TUMERIC-GING-OLIVE-OREG-CAPRYL 100 MG-150 MG- 50 MG-150 MG CAP    Take by mouth.    ZOLPIDEM (AMBIEN) 5 MG TAB

## 2022-09-27 ENCOUNTER — LAB VISIT (OUTPATIENT)
Dept: LAB | Facility: HOSPITAL | Age: 82
End: 2022-09-27
Attending: INTERNAL MEDICINE
Payer: MEDICARE

## 2022-09-27 ENCOUNTER — TELEPHONE (OUTPATIENT)
Dept: CARDIOLOGY | Facility: CLINIC | Age: 82
End: 2022-09-27
Payer: MEDICARE

## 2022-09-27 DIAGNOSIS — Z79.899 HIGH RISK MEDICATIONS (NOT ANTICOAGULANTS) LONG-TERM USE: ICD-10-CM

## 2022-09-27 DIAGNOSIS — I50.22 CHRONIC SYSTOLIC (CONGESTIVE) HEART FAILURE: ICD-10-CM

## 2022-09-27 DIAGNOSIS — I50.42 CHRONIC COMBINED SYSTOLIC AND DIASTOLIC CONGESTIVE HEART FAILURE: ICD-10-CM

## 2022-09-27 DIAGNOSIS — Z79.899 HIGH RISK MEDICATIONS (NOT ANTICOAGULANTS) LONG-TERM USE: Primary | ICD-10-CM

## 2022-09-27 DIAGNOSIS — Z88.9 H/O MULTIPLE ALLERGIES: Primary | ICD-10-CM

## 2022-09-27 DIAGNOSIS — I50.22 CHRONIC SYSTOLIC (CONGESTIVE) HEART FAILURE: Primary | ICD-10-CM

## 2022-09-27 LAB
ALBUMIN SERPL BCP-MCNC: 3.9 G/DL (ref 3.5–5.2)
ALP SERPL-CCNC: 84 U/L (ref 55–135)
ALT SERPL W/O P-5'-P-CCNC: 18 U/L (ref 10–44)
ANION GAP SERPL CALC-SCNC: 9 MMOL/L (ref 8–16)
APTT BLDCRRT: 27.1 SEC (ref 21–32)
AST SERPL-CCNC: 24 U/L (ref 10–40)
BILIRUB SERPL-MCNC: 0.7 MG/DL (ref 0.1–1)
BUN SERPL-MCNC: 27 MG/DL (ref 8–23)
CALCIUM SERPL-MCNC: 9.2 MG/DL (ref 8.7–10.5)
CHLORIDE SERPL-SCNC: 101 MMOL/L (ref 95–110)
CO2 SERPL-SCNC: 25 MMOL/L (ref 23–29)
CREAT SERPL-MCNC: 1.4 MG/DL (ref 0.5–1.4)
EST. GFR  (NO RACE VARIABLE): 51.1 ML/MIN/1.73 M^2
GLUCOSE SERPL-MCNC: 104 MG/DL (ref 70–110)
INR PPP: 1.1 (ref 0.8–1.2)
POTASSIUM SERPL-SCNC: 4.4 MMOL/L (ref 3.5–5.1)
PROT SERPL-MCNC: 7.3 G/DL (ref 6–8.4)
PROTHROMBIN TIME: 11.9 SEC (ref 9–12.5)
SODIUM SERPL-SCNC: 135 MMOL/L (ref 136–145)

## 2022-09-27 PROCEDURE — 85610 PROTHROMBIN TIME: CPT | Performed by: INTERNAL MEDICINE

## 2022-09-27 PROCEDURE — 85025 COMPLETE CBC W/AUTO DIFF WBC: CPT | Performed by: INTERNAL MEDICINE

## 2022-09-27 PROCEDURE — 36415 COLL VENOUS BLD VENIPUNCTURE: CPT | Performed by: INTERNAL MEDICINE

## 2022-09-27 PROCEDURE — 80053 COMPREHEN METABOLIC PANEL: CPT | Performed by: INTERNAL MEDICINE

## 2022-09-27 PROCEDURE — 85730 THROMBOPLASTIN TIME PARTIAL: CPT | Performed by: INTERNAL MEDICINE

## 2022-09-27 NOTE — TELEPHONE ENCOUNTER
Pt is not taking xarelto. Pt coming up to to obtain instructions. pb      Calling about his procedure date time and location. Please make sure labs have been drawn and hiblcleans bath is done. Also instruct when to hold the xarelto hold 2 days prior to procedure and resume one day after procedure.  hold dm meds which is his farxiga. Left a message to have the pt to contact our office pb

## 2022-09-28 ENCOUNTER — TELEPHONE (OUTPATIENT)
Dept: CARDIOLOGY | Facility: CLINIC | Age: 82
End: 2022-09-28
Payer: MEDICARE

## 2022-09-28 LAB
BASOPHILS # BLD AUTO: 0.03 K/UL (ref 0–0.2)
BASOPHILS NFR BLD: 0.5 % (ref 0–1.9)
DIFFERENTIAL METHOD: ABNORMAL
EOSINOPHIL # BLD AUTO: 0.2 K/UL (ref 0–0.5)
EOSINOPHIL NFR BLD: 3.6 % (ref 0–8)
ERYTHROCYTE [DISTWIDTH] IN BLOOD BY AUTOMATED COUNT: 16.5 % (ref 11.5–14.5)
HCT VFR BLD AUTO: 40.7 % (ref 40–54)
HGB BLD-MCNC: 13 G/DL (ref 14–18)
IMM GRANULOCYTES # BLD AUTO: 0.01 K/UL (ref 0–0.04)
IMM GRANULOCYTES NFR BLD AUTO: 0.2 % (ref 0–0.5)
LYMPHOCYTES # BLD AUTO: 0.9 K/UL (ref 1–4.8)
LYMPHOCYTES NFR BLD: 15.5 % (ref 18–48)
MCH RBC QN AUTO: 31.7 PG (ref 27–31)
MCHC RBC AUTO-ENTMCNC: 31.9 G/DL (ref 32–36)
MCV RBC AUTO: 99 FL (ref 82–98)
MONOCYTES # BLD AUTO: 0.8 K/UL (ref 0.3–1)
MONOCYTES NFR BLD: 13.6 % (ref 4–15)
NEUTROPHILS # BLD AUTO: 3.9 K/UL (ref 1.8–7.7)
NEUTROPHILS NFR BLD: 66.6 % (ref 38–73)
NRBC BLD-RTO: 0 /100 WBC
PLATELET # BLD AUTO: 134 K/UL (ref 150–450)
PMV BLD AUTO: 12.9 FL (ref 9.2–12.9)
RBC # BLD AUTO: 4.1 M/UL (ref 4.6–6.2)
WBC # BLD AUTO: 5.79 K/UL (ref 3.9–12.7)

## 2022-09-28 NOTE — PROGRESS NOTES
PT and PTT OK to proceed with surgery - it is not scheduled yet - there will be no need to repeat these labs though

## 2022-09-28 NOTE — PROGRESS NOTES
See comments below and call patient to discuss.   Please close encounter when done -- no need to route back to me.  Thanks  CBC as part of pre-op lab is OK   H/O abdominal hysterectomy    H/O  section    H/O partial nephrectomy  left 2007

## 2022-09-28 NOTE — TELEPHONE ENCOUNTER
Pt notified and verbalized understanding. Placed pt in recall list for Monday pb      Kristel Marti, BAY Cardozo LPN  Caller: Unspecified (Today,  9:28 AM)  He can take lasix 40 mg BID   Log weights and BP every day     We need to call and check on him on Monday     Thanks                       Pt states that he thinks is torsemide is causing a reaction. He stopped and and it went away when he resumed  it the itching returned and a rash. He did see derm yesterday and they biopsied the rash pt states he has lasix on hand at home that he is not taking please advise pb        ----- Message from Tani Mejias sent at 9/28/2022  8:55 AM CDT -----  Regarding: Allergic Reaction  Contact: self  Pt is requesting a call back in regards to a possible Allergic reaction to  medication prescribed. Please return the pts call as soon as possible. His call back number is [phone] (home)   Thank you    Sheila

## 2022-10-03 ENCOUNTER — TELEPHONE (OUTPATIENT)
Dept: CARDIOLOGY | Facility: CLINIC | Age: 82
End: 2022-10-03
Payer: MEDICARE

## 2022-10-03 NOTE — TELEPHONE ENCOUNTER
Called to check on the pt and got his vm. Left a message for him to contact the office    He can take lasix 40 mg BID   Log weights and BP every day     We need to call and check on him on Monday     Thanks

## 2022-10-04 NOTE — TELEPHONE ENCOUNTER
Coughing is denied ,  sleeping on 2-3 nightly states its for comfort, still having mild sob when walks. Sputum is denied. pb

## 2022-10-04 NOTE — TELEPHONE ENCOUNTER
----- Message from JENA Hernández-FLORINDA sent at 10/4/2022  8:34 AM CDT -----  Please call patient for CHF symptom check    Thanks  Route back    alberto

## 2022-10-04 NOTE — TELEPHONE ENCOUNTER
Called the pt about weight logs. He has not been weighting himself rboe explained. Pt verbalized understanding. pb

## 2022-10-05 ENCOUNTER — TELEPHONE (OUTPATIENT)
Dept: CARDIOLOGY | Facility: CLINIC | Age: 82
End: 2022-10-05
Payer: MEDICARE

## 2022-10-05 NOTE — TELEPHONE ENCOUNTER
Called and spoke with pt. Pt informed has NAVARRO at times like when hes pulling trash can to the street and bendopenia sometimes.   Denies CP, orthopnea, PND, or edema.     Pt informed has had itching for past 2 weeks, awaiting biopsy results from dermatologist who thinks could be a reaction from a medication.    Vitals: 135/96, 81              125/75, 78               139/82, 80    Wt today: 155 lb    Informed pt will notify provider of above information.

## 2022-10-05 NOTE — TELEPHONE ENCOUNTER
----- Message from Isis Choi RRT sent at 10/5/2022  8:38 AM CDT -----  Patient had been scheduled for a six minute walk at 8am on 10/6/22 by Anastacia Brandt on 9/26. Patient was also scheduled for Labs at 8am on 10/6/22 by Ella Cardozo on 9/27. Unfortunately the patient is also scheduled for a 8:45 echo on the same day. The patient will need to reschedule one of the appointments for another time.

## 2022-10-05 NOTE — TELEPHONE ENCOUNTER
----- Message from Anastacia Brandt RN sent at 10/5/2022 10:18 AM CDT -----  He wanted to do all in 1 day - the labs he can do after the walk and echo - he should be able to do the walk and make it to ECHO - I am pretty sure I told him that when I scheduled   Thanks   ----- Message -----  From: Isis Choi RRT  Sent: 10/5/2022   8:43 AM CDT  To: Anastacia Brandt RN, Ella Cardozo LPN    Patient had been scheduled for a six minute walk at 8am on 10/6/22 by Anastacia Brandt on 9/26. Patient was also scheduled for Labs at 8am on 10/6/22 by Ella Cardozo on 9/27. Unfortunately the patient is also scheduled for a 8:45 echo on the same day. The patient will need to reschedule one of the appointments for another time.

## 2022-10-06 ENCOUNTER — HOSPITAL ENCOUNTER (OUTPATIENT)
Dept: CARDIOLOGY | Facility: HOSPITAL | Age: 82
Discharge: HOME | End: 2022-10-06
Attending: STUDENT IN AN ORGANIZED HEALTH CARE EDUCATION/TRAINING PROGRAM
Payer: MEDICARE

## 2022-10-06 ENCOUNTER — CLINICAL SUPPORT (OUTPATIENT)
Dept: PULMONOLOGY | Facility: CLINIC | Age: 82
End: 2022-10-06
Payer: MEDICARE

## 2022-10-06 VITALS
BODY MASS INDEX: 22.87 KG/M2 | SYSTOLIC BLOOD PRESSURE: 148 MMHG | BODY MASS INDEX: 24.29 KG/M2 | WEIGHT: 164 LBS | HEART RATE: 101 BPM | DIASTOLIC BLOOD PRESSURE: 100 MMHG | HEIGHT: 69 IN | HEIGHT: 69 IN | WEIGHT: 154.44 LBS

## 2022-10-06 DIAGNOSIS — Z95.2 HISTORY OF AORTIC VALVE REPLACEMENT: ICD-10-CM

## 2022-10-06 DIAGNOSIS — I10 HYPERTENSION, UNSPECIFIED TYPE: ICD-10-CM

## 2022-10-06 DIAGNOSIS — I25.10 CORONARY ARTERY DISEASE INVOLVING NATIVE CORONARY ARTERY OF NATIVE HEART WITHOUT ANGINA PECTORIS: ICD-10-CM

## 2022-10-06 DIAGNOSIS — I50.42 CHRONIC COMBINED SYSTOLIC AND DIASTOLIC CONGESTIVE HEART FAILURE: ICD-10-CM

## 2022-10-06 DIAGNOSIS — I48.0 PAF (PAROXYSMAL ATRIAL FIBRILLATION): ICD-10-CM

## 2022-10-06 DIAGNOSIS — Z95.2 S/P AVR (AORTIC VALVE REPLACEMENT): ICD-10-CM

## 2022-10-06 PROCEDURE — 94618 PULMONARY STRESS TESTING: ICD-10-PCS | Mod: 26,S$PBB,, | Performed by: INTERNAL MEDICINE

## 2022-10-06 PROCEDURE — 93306 ECHO (CUPID ONLY): ICD-10-PCS | Mod: 26,,, | Performed by: INTERNAL MEDICINE

## 2022-10-06 PROCEDURE — 94618 PULMONARY STRESS TESTING: CPT | Mod: 26,S$PBB,, | Performed by: INTERNAL MEDICINE

## 2022-10-06 PROCEDURE — 93306 TTE W/DOPPLER COMPLETE: CPT

## 2022-10-06 PROCEDURE — 93306 TTE W/DOPPLER COMPLETE: CPT | Mod: 26,,, | Performed by: INTERNAL MEDICINE

## 2022-10-06 PROCEDURE — 94618 PULMONARY STRESS TESTING: CPT | Mod: PBBFAC

## 2022-10-06 NOTE — PROCEDURES
"[facility] - Pulmonary Function  Six Minute Walk     SUMMARY     Ordering Provider: Amos Grider MD   Interpreting Provider: Dr. Key  Performing nurse/tech/RT: SAMMIE Choi RRT  Diagnosis:  (Chronic combined systolic and diastolic CHF and S/P AVR)  Height: 5' 9" (175.3 cm)  Weight: 70.1 kg (154 lb 6.9 oz)  BMI (Calculated): 22.8   Patient Race:             Phase Oxygen Assessment Supplemental O2 Heart   Rate Blood Pressure Carlton Dyspnea Scale Rating   Resting 100 % Room Air 81 bpm (!) 158/97   1   Exercise        Minute        1 97 % Room Air 82 bpm     2 99 % Room Air 87 bpm     3 98 % Room Air 89 bpm     4 98 % Room Air 88 bpm     5 98 % Room Air 86 bpm     6  99 % Room Air 93 bpm 127/70 5-6   Recovery        Minute        1 99 % Room Air 85 bpm     2 100 % Room Air 82 bpm     3 100 % Room Air 81 bpm     4 100 % Room Air 78 bpm (!) 141/95   3     Six Minute Walk Summary  6MWT Status: completed without stopping  Patient Reported: Dyspnea     Interpretation:  Did the patient stop or pause?: No                                         Total Time Walked (Calculated): 360 seconds  Final Partial Lap Distance (feet): 100 feet  Total Distance Meters (Calculated): 335.28 meters  Predicted Distance Meters (Calculated): 493.13 meters  Percentage of Predicted (Calculated): 67.99  Peak VO2 (Calculated): 14.04  Mets: 4.01  Has The Patient Had a Previous Six Minute Walk Test?: No       Previous 6MWT Results  Has The Patient Had a Previous Six Minute Walk Test?: No        Interpretation:  Total distance walked in six minutes is mildly reduced indicating a reduction in overall  functional capacity. The patient did not meet criteria for supplemental oxygen prescription.  Clinical correlation suggested.  [] Mild exercise-induced hypoxemia described as an arterial oxygen saturation of 93-95% (with a fall of 3-4% with exercise),   [] Moderate exercise-induced hypoxemia as a fall in oxygen saturation to  89-93% (with a " fall of 3-4 % with exercise)  [] Severe exercise induced hypoxemia as < 89% O2 saturation (88% and below).  Medicare Criteria for Oxygen prescription comments: When arterial oxygen saturation is at or below 88% during exercise (severe exercise induced hypoxemia) then the patient falls under   Details about Medicare Group Criteria coverage can be found at http://[email or web address].[email or web address]/manuals/downloads/     Celestino Key MD

## 2022-10-07 ENCOUNTER — TELEPHONE (OUTPATIENT)
Dept: CARDIOLOGY | Facility: CLINIC | Age: 82
End: 2022-10-07
Payer: MEDICARE

## 2022-10-07 ENCOUNTER — OFFICE VISIT (OUTPATIENT)
Dept: CARDIOLOGY | Facility: CLINIC | Age: 82
End: 2022-10-07
Payer: MEDICARE

## 2022-10-07 VITALS
BODY MASS INDEX: 22.85 KG/M2 | SYSTOLIC BLOOD PRESSURE: 130 MMHG | HEIGHT: 69 IN | HEART RATE: 80 BPM | WEIGHT: 154.31 LBS | OXYGEN SATURATION: 98 % | DIASTOLIC BLOOD PRESSURE: 90 MMHG

## 2022-10-07 DIAGNOSIS — I45.2 RBBB PLUS LA HEMIBLOCK: ICD-10-CM

## 2022-10-07 DIAGNOSIS — I50.42 CHRONIC COMBINED SYSTOLIC AND DIASTOLIC CONGESTIVE HEART FAILURE: ICD-10-CM

## 2022-10-07 DIAGNOSIS — Z88.9 H/O MULTIPLE ALLERGIES: ICD-10-CM

## 2022-10-07 DIAGNOSIS — I50.22 CHRONIC SYSTOLIC (CONGESTIVE) HEART FAILURE: ICD-10-CM

## 2022-10-07 DIAGNOSIS — I10 WHITE COAT SYNDROME WITH DIAGNOSIS OF HYPERTENSION: ICD-10-CM

## 2022-10-07 DIAGNOSIS — I48.0 PAF (PAROXYSMAL ATRIAL FIBRILLATION): ICD-10-CM

## 2022-10-07 DIAGNOSIS — I10 HYPERTENSION, UNSPECIFIED TYPE: Primary | ICD-10-CM

## 2022-10-07 DIAGNOSIS — Z95.2 HISTORY OF AORTIC VALVE REPLACEMENT: ICD-10-CM

## 2022-10-07 DIAGNOSIS — I50.42 CHRONIC COMBINED SYSTOLIC AND DIASTOLIC CONGESTIVE HEART FAILURE: Primary | ICD-10-CM

## 2022-10-07 DIAGNOSIS — R21 RASH: ICD-10-CM

## 2022-10-07 DIAGNOSIS — Z79.899 HIGH RISK MEDICATIONS (NOT ANTICOAGULANTS) LONG-TERM USE: ICD-10-CM

## 2022-10-07 LAB
ASCENDING AORTA: 2.75 CM
AV INDEX (PROSTH): 0.35
AV MEAN GRADIENT: 10 MMHG
AV PEAK GRADIENT: 17 MMHG
AV VALVE AREA: 1.13 CM2
AV VELOCITY RATIO: 0.36
BSA FOR ECHO PROCEDURE: 1.9 M2
CV ECHO LV RWT: 0.44 CM
DOP CALC AO PEAK VEL: 2.07 M/S
DOP CALC AO VTI: 39.3 CM
DOP CALC LVOT AREA: 3.2 CM2
DOP CALC LVOT DIAMETER: 2.03 CM
DOP CALC LVOT PEAK VEL: 0.74 M/S
DOP CALC LVOT STROKE VOLUME: 44.32 CM3
DOP CALC RVOT PEAK VEL: 0.43 M/S
DOP CALC RVOT VTI: 7.6 CM
DOP CALCLVOT PEAK VEL VTI: 13.7 CM
E WAVE DECELERATION TIME: 146.87 MSEC
E/E' RATIO: 11.38 M/S
ECHO LV POSTERIOR WALL: 1.08 CM (ref 0.6–1.1)
EJECTION FRACTION: 20 %
FRACTIONAL SHORTENING: 7 % (ref 28–44)
INTERVENTRICULAR SEPTUM: 1.33 CM (ref 0.6–1.1)
IVRT: 86.58 MSEC
LA MAJOR: 5.86 CM
LA MINOR: 6.15 CM
LA WIDTH: 4.2 CM
LEFT ATRIUM SIZE: 4.94 CM
LEFT ATRIUM VOLUME INDEX MOD: 37.2 ML/M2
LEFT ATRIUM VOLUME INDEX: 57.2 ML/M2
LEFT ATRIUM VOLUME MOD: 68.83 CM3
LEFT ATRIUM VOLUME: 105.84 CM3
LEFT INTERNAL DIMENSION IN SYSTOLE: 4.56 CM (ref 2.1–4)
LEFT VENTRICLE DIASTOLIC VOLUME INDEX: 60.51 ML/M2
LEFT VENTRICLE DIASTOLIC VOLUME: 111.95 ML
LEFT VENTRICLE MASS INDEX: 122 G/M2
LEFT VENTRICLE SYSTOLIC VOLUME INDEX: 51.5 ML/M2
LEFT VENTRICLE SYSTOLIC VOLUME: 95.36 ML
LEFT VENTRICULAR INTERNAL DIMENSION IN DIASTOLE: 4.88 CM (ref 3.5–6)
LEFT VENTRICULAR MASS: 226.24 G
LV LATERAL E/E' RATIO: 10.57 M/S
LV SEPTAL E/E' RATIO: 12.33 M/S
LVOT MG: 1.25 MMHG
LVOT MV: 0.52 CM/S
MV PEAK E VEL: 0.74 M/S
PISA TR MAX VEL: 3.04 M/S
PV MEAN GRADIENT: 0.3 MMHG
PV PEAK VELOCITY: 0.85 CM/S
RA MAJOR: 6.38 CM
RA WIDTH: 4.21 CM
RIGHT VENTRICULAR END-DIASTOLIC DIMENSION: 4.22 CM
SINUS: 2.27 CM
STJ: 2.2 CM
TDI LATERAL: 0.07 M/S
TDI SEPTAL: 0.06 M/S
TDI: 0.07 M/S
TR MAX PG: 37 MMHG

## 2022-10-07 PROCEDURE — 99214 PR OFFICE/OUTPT VISIT, EST, LEVL IV, 30-39 MIN: ICD-10-PCS | Mod: S$PBB,,, | Performed by: STUDENT IN AN ORGANIZED HEALTH CARE EDUCATION/TRAINING PROGRAM

## 2022-10-07 PROCEDURE — 99213 OFFICE O/P EST LOW 20 MIN: CPT | Mod: PBBFAC,PO | Performed by: STUDENT IN AN ORGANIZED HEALTH CARE EDUCATION/TRAINING PROGRAM

## 2022-10-07 PROCEDURE — 99999 PR PBB SHADOW E&M-EST. PATIENT-LVL III: CPT | Mod: PBBFAC,,, | Performed by: STUDENT IN AN ORGANIZED HEALTH CARE EDUCATION/TRAINING PROGRAM

## 2022-10-07 PROCEDURE — 99214 OFFICE O/P EST MOD 30 MIN: CPT | Mod: S$PBB,,, | Performed by: STUDENT IN AN ORGANIZED HEALTH CARE EDUCATION/TRAINING PROGRAM

## 2022-10-07 PROCEDURE — 99999 PR PBB SHADOW E&M-EST. PATIENT-LVL III: ICD-10-PCS | Mod: PBBFAC,,, | Performed by: STUDENT IN AN ORGANIZED HEALTH CARE EDUCATION/TRAINING PROGRAM

## 2022-10-07 RX ORDER — FUROSEMIDE 40 MG/1
40 TABLET ORAL DAILY
COMMUNITY
Start: 2022-01-13 | End: 2022-11-18

## 2022-10-07 RX ORDER — SODIUM CHLORIDE 0.9 % (FLUSH) 0.9 %
10 SYRINGE (ML) INJECTION
Status: CANCELLED | OUTPATIENT
Start: 2022-10-11

## 2022-10-07 NOTE — PROGRESS NOTES
Section of Cardiology                  Cardiac Clinic Note    Chief Complaint/Reason for consultation:  Establish care      HPI:   Edmund Chu is a 82 y.o. male with h/o hypertension, CHF, AVR 2016 ([facility]), h/o colon cancerwho comes in to Cardiology Clinic to establish care.    7/27/2022  Per Care everywhere, last echocardiogram March 2021 with redo use LV function, right heart failure, moderate mitral regurgitation, normal functioning bioprosthetic aortic valve    Comes in with wife   Only coming in for ICD evaluation   Still seeing Dr. Viveros  Has had CHF since 2009- did not want ICD until now    Aortic valve replacement, no aspirin due to mild bruising     Reports heart issues since 2009- had colon cancer s/p resection     Walks regularly, 1.2 miles daily, worsening SOB  Recent orthopnea last few days  Reports leg swelling- recently needed to take torsemide which he doesn't take it regularly   He's a vegetarian, does not use salt  Trying to watch fluid intake     Family hx: cousin: ICD, afib   Stopped smoking at age of 30 yo. ETOH occ    8/31/22  BNP level was elevated, torsemide 100 mg daily increased to 200 mg daily at the time  Repeat BNP level was elevated, CMP showed stable renal function  Patient now back down to 100 mg   Dropped 6lbs since last visit  Reports daily weights, fluid and salt restriction  Has not taken Xarelto in fear of bleeding if he cuts himself  Blood pressure readings normotensive at home ranges a 110 to 135 systolic    Denies chest pain, shortness of breath, dizziness, palpitations, orthopnea, PND      10/7/22  Saw Dr. Wong, will be having ICD placement- but patient wants to wait  Reports rash/itching, unsure of which medication is causing it  Has seen dermatology  Switched torsemide to lasix due to itching, however, itching has not improved  BP elevated  Echo pending     Denies chest pain, shortness of breath, dizziness, palpitations, orthopnea,  PND        EKG 8/31/2022 NSR, RBBB, LAFB, PVCs,  ms   EKG 7/27/2022 NSR, RBBB, LAFB, inferior infarct, qtc 529 ms          Prior records  Stress test 12/2020 small reversible defect, apical segment.  Medium perfusion defect inferior wall  C 2/21:  Left main 20% stenosis   Lad:  Mid LAD 50% stenosis   Left circumflex: Diffuse irregularity  Mid RCA proximal portion 50% stenosis  Distal RCA 50% stenosis   Ramus diffuse irregularity  Echo 11/21 EF 30%, RV function is severely decreased          ECHO  3/21 (Care everywhere)  CONCLUSIONS:   - Exam indication: Initial evaluation of known cardiomyopathy   - The left ventricle is mildly dilated. There is left ventricular hypertrophy.   Left ventricular systolic function is severely decreased. EF = 26 ± 5% (2D 4-ch.)   Definity contrast used for endocardial border detection.   - The right ventricle is dilated. Right ventricular systolic function is   moderately decreased.   - The left atrial cavity is severely dilated.   - The right atrial cavity is dilated.   - There is moderate (2+ - 3+) mitral valve regurgitation due to restricted leaflet    motion and apical tethering of normal mitral leaflet caused by LV enlargement.   Regurgitant orifice area (PISA) is 0.24 cm².   - Porcine prosthetic aortic valve. There is trace aortic valve regurgitation. The   peak gradient is 10 mmHg, the mean gradient is 5 mmHg and the dimensionless valve   index is 0.31.   - Estimated right ventricular systolic pressure is 42 mmHg plus right atrial   pressure. Estimated right atrial pressure is not included as the IVC was not seen.   - The patient has not had a prior CC echocardiographic exam for comparison.       STRESS TEST    [facility]      ROS: All 10 systems reviewed. Please refer to the HPI for pertinent positives. All other systems negative.     Past Medical History  Past Medical History:   Diagnosis Date    CHF (congestive heart failure)     Hypertension        Surgical History  Past  "Surgical History:   Procedure Laterality Date    AORTIC VALVE REPLACEMENT  2016    colon section removal            Allergies:   Review of patient's allergies indicates:   Allergen Reactions    Sacubitril-valsartan Hives and Swelling    Amlodipine Edema     Ankle    Lactose Other (See Comments)     GI distress    Lisinopril Other (See Comments)    Mefloquine Hives and Itching     Anti malarial         Social History:  Social History     Socioeconomic History    Marital status:    Tobacco Use    Smoking status: Former    Smokeless tobacco: Never   Substance and Sexual Activity    Alcohol use: Yes    Drug use: Never       Family History:  family history includes Colon cancer in his father; Hypertension in his mother.    Home Medications:  Current Outpatient Medications on File Prior to Visit   Medication Sig Dispense Refill    carvediloL (COREG) 12.5 MG tablet Take 2 tablets (25 mg total) by mouth 2 (two) times daily with meals. 60 tablet 11    co-enzyme Q-10 50 mg capsule Take 50 mg by mouth once daily.      dapagliflozin (FARXIGA) 10 mg tablet Take 1 tablet (10 mg total) by mouth once daily. 30 tablet 6    furosemide (LASIX) 40 MG tablet Take 40 mg by mouth once daily.      LORazepam (ATIVAN) 0.5 MG tablet Take 0.5 mg by mouth 2 (two) times daily.      olmesartan (BENICAR) 40 MG tablet Take 20 mg by mouth once daily.      sildenafiL (VIAGRA) 100 MG tablet Take 100 mg by mouth.      spironolactone (ALDACTONE) 25 MG tablet       triamcinolone acetonide 0.1% (KENALOG) 0.1 % cream AAA bid 454 g 1    tumeric-ging-olive-oreg-capryl 100 mg-150 mg- 50 mg-150 mg Cap Take by mouth.      zolpidem (AMBIEN) 5 MG Tab       torsemide (DEMADEX) 20 MG Tab Take 20 mg by mouth once daily.       No current facility-administered medications on file prior to visit.       Physical exam:  BP (!) 130/90   Pulse 80   Ht 5' 9" (1.753 m)   Wt 70 kg (154 lb 5.2 oz)   SpO2 98%   BMI 22.79 kg/m²         General: Pt is a 82 y.o. year " old male who is AAOx3, in NAD, is pleasant, well nourished, looks stated age  HEENT: PERRL, EOMI, Oral mucosa pink & moist  CVS  No abnormal cardiac pulsations noted on inspection. JVP not raised. The apical impulse is normal on palpation, and is located in the left 5th intercostal space in the mid - clavicular line. No palpable thrills or abnormal pulsations noted. RR, S1 - S2 heard, no murmurs, rubs or gallops appreciated.   PUL : CTA B/L. No wheezes/crackles heard   ABD : BS +, soft. No tenderness elicited   LE : No C/C/E. Distal Pulses palpable B/L         LABS:    Chemistry:   Lab Results   Component Value Date     10/06/2022     10/06/2022    K 4.9 10/06/2022    K 4.9 10/06/2022    CL 99 10/06/2022    CL 99 10/06/2022    CO2 28 10/06/2022    CO2 28 10/06/2022    BUN 32 (H) 10/06/2022    BUN 32 (H) 10/06/2022    CREATININE 1.7 (H) 10/06/2022    CREATININE 1.7 (H) 10/06/2022    CALCIUM 9.8 10/06/2022    CALCIUM 9.8 10/06/2022     Cardiac Markers: No results found for: CKTOTAL, CKMB, CKMBINDEX, TROPONINI  Cardiac Markers (Last 3): No results found for: CKTOTAL, CKMB, CKMBINDEX, TROPONINI  CBC:   Lab Results   Component Value Date    WBC 5.68 10/06/2022    HGB 14.0 10/06/2022    HCT 43.6 10/06/2022    MCV 98 10/06/2022     (L) 10/06/2022     Lipids: No results found for: CHOL, TRIG, HDL, LDLDIRECT  Coagulation:   Lab Results   Component Value Date    INR 1.2 10/06/2022    APTT 27.8 10/06/2022           Assessment      1. Hypertension, unspecified type    2. Chronic combined systolic and diastolic congestive heart failure    3. History of aortic valve replacement    4. PAF (paroxysmal atrial fibrillation)    5. High risk medications (not anticoagulants) long-term use    6. H/O multiple allergies    7. RBBB plus LA hemiblock    8. White coat syndrome with diagnosis of hypertension    9. Rash             Plan:    Congestive heart failure  NYHA class III, stage C  Systolic heart failure with right  ventricular failure  Continue carvedilol, Benicar  Stopped entresto - due to itching   On lasix now   BNP trending down  F/u with Dr. Grider for ICD/CRTD- wants to postpone procedure until rash clears    Rash/itching  Possibly drug reaction  Refer to Allergy/immunology    Aortic valve replacement  Normally function per last echo in 2021  Repeat echo pending     PAF  Would consider restarting Xarelto    Hypertension  Elevated  Enroll into digital medicine program- pending   Continue carvedilol 25 mg b.i.d.  Continue Benicar  Stopped spironolactone   (says Entresto had side effects)      This note was prepared using voice recognition system and is likely to have sound alike errors that may have been overlooked even after proofreading.     I have reviewed all pertinent chart information.  Plans and recommendations have been formulated under my direct supervision. All questions answered and patient voiced understanding.   If symptoms persist go to the ED.    RTC in 1 month         Tere Johns MD  Cardiology

## 2022-10-10 ENCOUNTER — TELEPHONE (OUTPATIENT)
Dept: CARDIOLOGY | Facility: CLINIC | Age: 82
End: 2022-10-10
Payer: MEDICARE

## 2022-10-10 NOTE — TELEPHONE ENCOUNTER
Pt states that dr aragon knows that he wants to postpone his device placement. Now he wants to go thru with the procedure. He would like the soonest appt available thanks pb      ----- Message from Surendra Rowe sent at 10/10/2022 10:59 AM CDT -----  Patient is requesting a call back regarding appt for defibrillator - please call him back at [phone]. Thanks

## 2022-10-11 ENCOUNTER — TELEPHONE (OUTPATIENT)
Dept: CARDIOLOGY | Facility: CLINIC | Age: 82
End: 2022-10-11
Payer: MEDICARE

## 2022-10-11 NOTE — PROGRESS NOTES
See comments below and call patient to discuss.   Please close encounter when done -- no need to route back to me.  Thanks    This 6 min walk result confirms that patient has functional limitations that are at least class II.     Would proceed with intended ICD implant.

## 2022-10-11 NOTE — TELEPHONE ENCOUNTER
Pt was notified about the results.    Ms Ybarra can we get a date for the implant? The pt wants very soon. Please give him a call pb    ----- Message from Amos Grider MD sent at 10/11/2022  8:04 AM CDT -----  See comments below and call patient to discuss.   Please close encounter when done -- no need to route back to me.  Thanks    This 6 min walk result confirms that patient has functional limitations that are at least class II.     Would proceed with intended ICD implant.

## 2022-10-11 NOTE — TELEPHONE ENCOUNTER
10/12 Awaiting Dr. Basilio's review of Echo will probably reschedule after  or on 10/25/22    Also needs to reschedule case ICD vs CRT-D  Will verify with Dr. Bourne before rescheduling      ----- Message from Amos Grider MD sent at 10/10/2022  7:40 PM CDT -----  See comments below and call patient to discuss.   Please close encounter when done -- no need to route back to me.  Thanks  Pre-op BMP OK - Cr 1.7 is stable

## 2022-10-20 NOTE — TELEPHONE ENCOUNTER
----- Message from Kasandra Ruffin MA sent at 10/20/2022  2:34 PM CDT -----  Contact: pt    ----- Message -----  From: Lana Collazo  Sent: 10/20/2022   2:32 PM CDT  To: Marni BRIDGES Staff    Type:  Patient Returning Call    Who Called: pt  Who Left Message for Patient: unknown   Does the patient know what this is regarding? no  Would the patient rather a call back or a response via [facility]? Call back  Best Call Back Number: [phone]  Additional Information: n/a

## 2022-10-20 NOTE — TELEPHONE ENCOUNTER
Recalled patient to discuss/confirm ICD implant on 10/27 for 1230-1:00PM  Advised 1030 arrival time, NPO status after midnight, hold diuretics and diabetic medications  Reviewed medication list and patient stated he was no longer taking Farxiga, Torsemide, Spironolactone   Lasix as needed, Carvedilol, Olmesartan, Lorazepam stopped all others because of rash on his back  Ready to proceed and asked if instructions could be e-mailed to [email or web address] since he has not signed up for [facility] yet  Stated he'd seen an outside Dermatologist and was using a cream that was not really helping

## 2022-10-20 NOTE — TELEPHONE ENCOUNTER
Instructions for ICD implant on Thursday, October 27, 2022 for 12:30-1:00 PM  Amada Dove [email or web address]  Thu 10/20/2022 5:40 PM    To: [email or web address]  Scheduled for ICD implant with Dr. Bourne at [facility] [id number] [place] [address]  [address]    Please report to First Floor Registration Dept for 1030-11:00AM  *You will be called on Wednesday to confirm time of arrival    Do not eat or drink anything after midnight except for small amount of water with your usual  heart/blood pressure medications    Avoid all diuretics (fluid pills) and diabetic medications on Thursday morning    Please shower with Hibiclens (antibacterial soap) on Wednesday evening and Thursday morning    Wear loose-fitting clothing or button front shirt due to arm movement restrictions after ICD implant    Please call [phone] for any questions or concerns    Thank you so much

## 2022-10-20 NOTE — TELEPHONE ENCOUNTER
Left voice mail message for call back  Echocardiogram reviewed by Dr. Basilio as requested by MD Amada Mead, MATT  Thank you for this. I reviewed TDI's and there is no dyssynchrony.  Please make sure Dr. Persaud knows all the additional information I sent to you. Thank you. AMA

## 2022-10-27 ENCOUNTER — ANESTHESIA (OUTPATIENT)
Dept: CARDIOLOGY | Facility: HOSPITAL | Age: 82
End: 2022-10-27
Payer: MEDICARE

## 2022-10-27 ENCOUNTER — ANESTHESIA EVENT (OUTPATIENT)
Dept: CARDIOLOGY | Facility: HOSPITAL | Age: 82
End: 2022-10-27
Payer: MEDICARE

## 2022-10-27 ENCOUNTER — HOSPITAL ENCOUNTER (OUTPATIENT)
Facility: HOSPITAL | Age: 82
Discharge: HOME | End: 2022-10-27
Attending: INTERNAL MEDICINE | Admitting: INTERNAL MEDICINE
Payer: MEDICARE

## 2022-10-27 DIAGNOSIS — Z95.2 S/P AVR (AORTIC VALVE REPLACEMENT): ICD-10-CM

## 2022-10-27 DIAGNOSIS — I50.42 CHRONIC COMBINED SYSTOLIC AND DIASTOLIC CONGESTIVE HEART FAILURE: ICD-10-CM

## 2022-10-27 DIAGNOSIS — I48.0 PAF (PAROXYSMAL ATRIAL FIBRILLATION): ICD-10-CM

## 2022-10-27 DIAGNOSIS — I45.2 RBBB PLUS LA HEMIBLOCK: ICD-10-CM

## 2022-10-27 DIAGNOSIS — Z95.810 ICD (IMPLANTABLE CARDIOVERTER-DEFIBRILLATOR) IN PLACE: Primary | ICD-10-CM

## 2022-10-27 DIAGNOSIS — I50.22 CHRONIC SYSTOLIC (CONGESTIVE) HEART FAILURE: ICD-10-CM

## 2022-10-27 PROCEDURE — C1894 INTRO/SHEATH, NON-LASER: HCPCS | Performed by: INTERNAL MEDICINE

## 2022-10-27 PROCEDURE — 37000008 HC ANESTHESIA 1ST 15 MINUTES: Performed by: INTERNAL MEDICINE

## 2022-10-27 PROCEDURE — 93010 EKG 12-LEAD: ICD-10-PCS | Mod: ,,, | Performed by: INTERNAL MEDICINE

## 2022-10-27 PROCEDURE — 33249 INSJ/RPLCMT DEFIB W/LEAD(S): CPT | Mod: ,,, | Performed by: INTERNAL MEDICINE

## 2022-10-27 PROCEDURE — 63600175 PHARM REV CODE 636 W HCPCS: Performed by: INTERNAL MEDICINE

## 2022-10-27 PROCEDURE — 25000003 PHARM REV CODE 250: Performed by: STUDENT IN AN ORGANIZED HEALTH CARE EDUCATION/TRAINING PROGRAM

## 2022-10-27 PROCEDURE — 33249 INSJ/RPLCMT DEFIB W/LEAD(S): CPT | Performed by: INTERNAL MEDICINE

## 2022-10-27 PROCEDURE — 63600175 PHARM REV CODE 636 W HCPCS: Performed by: STUDENT IN AN ORGANIZED HEALTH CARE EDUCATION/TRAINING PROGRAM

## 2022-10-27 PROCEDURE — 33249 PR ICD INSERT SNGL/DUAL W/LEADS: ICD-10-PCS | Mod: ,,, | Performed by: INTERNAL MEDICINE

## 2022-10-27 PROCEDURE — 99499 NO LOS: ICD-10-PCS | Mod: ,,, | Performed by: INTERNAL MEDICINE

## 2022-10-27 PROCEDURE — C1769 GUIDE WIRE: HCPCS | Performed by: INTERNAL MEDICINE

## 2022-10-27 PROCEDURE — 27201423 OPTIME MED/SURG SUP & DEVICES STERILE SUPPLY: Performed by: INTERNAL MEDICINE

## 2022-10-27 PROCEDURE — 99499 UNLISTED E&M SERVICE: CPT | Mod: ,,, | Performed by: INTERNAL MEDICINE

## 2022-10-27 PROCEDURE — 93005 ELECTROCARDIOGRAM TRACING: CPT

## 2022-10-27 PROCEDURE — C1895 LEAD, AICD, ENDO DUAL COIL: HCPCS | Performed by: INTERNAL MEDICINE

## 2022-10-27 PROCEDURE — 93010 ELECTROCARDIOGRAM REPORT: CPT | Mod: ,,, | Performed by: INTERNAL MEDICINE

## 2022-10-27 PROCEDURE — C1722 AICD, SINGLE CHAMBER: HCPCS | Performed by: INTERNAL MEDICINE

## 2022-10-27 PROCEDURE — 25000003 PHARM REV CODE 250: Performed by: INTERNAL MEDICINE

## 2022-10-27 PROCEDURE — 37000009 HC ANESTHESIA EA ADD 15 MINS: Performed by: INTERNAL MEDICINE

## 2022-10-27 DEVICE — IMPLANTABLE DEVICE
Type: IMPLANTABLE DEVICE | Site: CHEST  WALL | Status: FUNCTIONAL
Brand: ACTICOR 7 VR-T DX

## 2022-10-27 DEVICE — IMPLANTABLE DEVICE
Type: IMPLANTABLE DEVICE | Site: HEART | Status: FUNCTIONAL
Brand: PLEXA PROMRI

## 2022-10-27 RX ORDER — MIDAZOLAM HYDROCHLORIDE 1 MG/ML
INJECTION, SOLUTION INTRAMUSCULAR; INTRAVENOUS
Status: DISCONTINUED | OUTPATIENT
Start: 2022-10-27 | End: 2022-10-27

## 2022-10-27 RX ORDER — CEFAZOLIN SODIUM 2 G/50ML
2 SOLUTION INTRAVENOUS
Status: COMPLETED | OUTPATIENT
Start: 2022-10-27 | End: 2022-10-27

## 2022-10-27 RX ORDER — KETAMINE HYDROCHLORIDE 50 MG/ML
INJECTION, SOLUTION INTRAMUSCULAR; INTRAVENOUS
Status: DISCONTINUED | OUTPATIENT
Start: 2022-10-27 | End: 2022-10-27

## 2022-10-27 RX ORDER — VANCOMYCIN HYDROCHLORIDE 1 G/20ML
INJECTION, POWDER, LYOPHILIZED, FOR SOLUTION INTRAVENOUS
Status: DISCONTINUED | OUTPATIENT
Start: 2022-10-27 | End: 2022-10-27 | Stop reason: HOSPADM

## 2022-10-27 RX ORDER — DEXAMETHASONE SODIUM PHOSPHATE 4 MG/ML
INJECTION, SOLUTION INTRA-ARTICULAR; INTRALESIONAL; INTRAMUSCULAR; INTRAVENOUS; SOFT TISSUE
Status: DISCONTINUED | OUTPATIENT
Start: 2022-10-27 | End: 2022-10-27

## 2022-10-27 RX ORDER — ACETAMINOPHEN 325 MG/1
650 TABLET ORAL EVERY 4 HOURS PRN
Status: DISCONTINUED | OUTPATIENT
Start: 2022-10-27 | End: 2022-10-27 | Stop reason: HOSPADM

## 2022-10-27 RX ORDER — SODIUM CHLORIDE, SODIUM LACTATE, POTASSIUM CHLORIDE, CALCIUM CHLORIDE 600; 310; 30; 20 MG/100ML; MG/100ML; MG/100ML; MG/100ML
INJECTION, SOLUTION INTRAVENOUS CONTINUOUS PRN
Status: DISCONTINUED | OUTPATIENT
Start: 2022-10-27 | End: 2022-10-27

## 2022-10-27 RX ORDER — LIDOCAINE HYDROCHLORIDE 20 MG/ML
INJECTION, SOLUTION EPIDURAL; INFILTRATION; INTRACAUDAL; PERINEURAL
Status: DISCONTINUED | OUTPATIENT
Start: 2022-10-27 | End: 2022-10-27 | Stop reason: HOSPADM

## 2022-10-27 RX ORDER — PROPOFOL 10 MG/ML
VIAL (ML) INTRAVENOUS
Status: DISCONTINUED | OUTPATIENT
Start: 2022-10-27 | End: 2022-10-27

## 2022-10-27 RX ORDER — CEFADROXIL 500 MG/1
500 CAPSULE ORAL EVERY 12 HOURS
Qty: 6 CAPSULE | Refills: 0 | Status: SHIPPED | OUTPATIENT
Start: 2022-10-27 | End: 2022-12-22

## 2022-10-27 RX ORDER — LIDOCAINE HYDROCHLORIDE 10 MG/ML
INJECTION, SOLUTION EPIDURAL; INFILTRATION; INTRACAUDAL; PERINEURAL
Status: DISCONTINUED | OUTPATIENT
Start: 2022-10-27 | End: 2022-10-27

## 2022-10-27 RX ORDER — HYDROCODONE BITARTRATE AND ACETAMINOPHEN 10; 325 MG/1; MG/1
1 TABLET ORAL EVERY 4 HOURS PRN
Status: DISCONTINUED | OUTPATIENT
Start: 2022-10-27 | End: 2022-10-27 | Stop reason: HOSPADM

## 2022-10-27 RX ORDER — HYDROCODONE BITARTRATE AND ACETAMINOPHEN 5; 325 MG/1; MG/1
1 TABLET ORAL EVERY 4 HOURS PRN
Status: DISCONTINUED | OUTPATIENT
Start: 2022-10-27 | End: 2022-10-27 | Stop reason: HOSPADM

## 2022-10-27 RX ORDER — SODIUM CHLORIDE 0.9 % (FLUSH) 0.9 %
10 SYRINGE (ML) INJECTION
Status: DISCONTINUED | OUTPATIENT
Start: 2022-10-27 | End: 2022-10-27 | Stop reason: HOSPADM

## 2022-10-27 RX ORDER — ONDANSETRON 2 MG/ML
INJECTION INTRAMUSCULAR; INTRAVENOUS
Status: DISCONTINUED | OUTPATIENT
Start: 2022-10-27 | End: 2022-10-27

## 2022-10-27 RX ORDER — HYDROCODONE BITARTRATE AND ACETAMINOPHEN 10; 325 MG/1; MG/1
1 TABLET ORAL EVERY 6 HOURS PRN
Qty: 28 TABLET | Refills: 0 | Status: SHIPPED | OUTPATIENT
Start: 2022-10-27 | End: 2022-12-22

## 2022-10-27 RX ORDER — PROPOFOL 10 MG/ML
VIAL (ML) INTRAVENOUS CONTINUOUS PRN
Status: DISCONTINUED | OUTPATIENT
Start: 2022-10-27 | End: 2022-10-27

## 2022-10-27 RX ORDER — FENTANYL CITRATE 50 UG/ML
INJECTION, SOLUTION INTRAMUSCULAR; INTRAVENOUS
Status: DISCONTINUED | OUTPATIENT
Start: 2022-10-27 | End: 2022-10-27

## 2022-10-27 RX ADMIN — PROPOFOL 40 MCG/KG/MIN: 10 INJECTION, EMULSION INTRAVENOUS at 02:10

## 2022-10-27 RX ADMIN — SODIUM CHLORIDE, SODIUM LACTATE, POTASSIUM CHLORIDE, AND CALCIUM CHLORIDE: 600; 310; 30; 20 INJECTION, SOLUTION INTRAVENOUS at 02:10

## 2022-10-27 RX ADMIN — KETAMINE HYDROCHLORIDE 20 MG: 50 INJECTION, SOLUTION, CONCENTRATE INTRAMUSCULAR; INTRAVENOUS at 02:10

## 2022-10-27 RX ADMIN — LIDOCAINE HYDROCHLORIDE 100 MG: 10 INJECTION, SOLUTION EPIDURAL; INFILTRATION; INTRACAUDAL; PERINEURAL at 02:10

## 2022-10-27 RX ADMIN — HYDROCODONE BITARTRATE AND ACETAMINOPHEN 1 TABLET: 5; 325 TABLET ORAL at 05:10

## 2022-10-27 RX ADMIN — DEXAMETHASONE SODIUM PHOSPHATE 4 MG: 4 INJECTION, SOLUTION INTRA-ARTICULAR; INTRALESIONAL; INTRAMUSCULAR; INTRAVENOUS; SOFT TISSUE at 02:10

## 2022-10-27 RX ADMIN — MIDAZOLAM 2 MG: 1 INJECTION INTRAMUSCULAR; INTRAVENOUS at 02:10

## 2022-10-27 RX ADMIN — FENTANYL CITRATE 50 MCG: 50 INJECTION, SOLUTION INTRAMUSCULAR; INTRAVENOUS at 02:10

## 2022-10-27 RX ADMIN — ONDANSETRON 4 MG: 2 INJECTION, SOLUTION INTRAMUSCULAR; INTRAVENOUS at 02:10

## 2022-10-27 RX ADMIN — FENTANYL CITRATE 50 MCG: 50 INJECTION, SOLUTION INTRAMUSCULAR; INTRAVENOUS at 03:10

## 2022-10-27 RX ADMIN — PROPOFOL 20 MG: 10 INJECTION, EMULSION INTRAVENOUS at 03:10

## 2022-10-27 RX ADMIN — PROPOFOL 30 MG: 10 INJECTION, EMULSION INTRAVENOUS at 02:10

## 2022-10-27 RX ADMIN — CEFAZOLIN SODIUM 2 G: 2 SOLUTION INTRAVENOUS at 02:10

## 2022-10-27 NOTE — ANESTHESIA POSTPROCEDURE EVALUATION
Anesthesia Post Evaluation    Patient: Edmund Chu    Procedure(s) Performed: Procedure(s) (LRB):  INSERTION, ICD, DUAL CHAMBER (Left)    Final Anesthesia Type: MAC      Patient location during evaluation: Cath Lab  Patient participation: Yes- Able to Participate  Level of consciousness: awake and alert  Post-procedure vital signs: reviewed and stable  Pain management: adequate  Airway patency: patent  DIVYA mitigation strategies: Multimodal analgesia  PONV status at discharge: No PONV  Anesthetic complications: no      Cardiovascular status: blood pressure returned to baseline  Respiratory status: unassisted and spontaneous ventilation  Hydration status: euvolemic  Follow-up not needed.          Vitals Value Taken Time   /85 10/27/22 1114   Temp 36.9 °C (98.4 °F) 10/27/22 1114   Pulse 77 10/27/22 1114   Resp 18 10/27/22 1114   SpO2 100 % 10/27/22 1114         No case tracking events are documented in the log.      Pain/Sheyla Score: Sheyla Score: 9 (10/27/2022  4:20 PM)

## 2022-10-27 NOTE — DISCHARGE INSTRUCTIONS
PACEMAKER/ICD INSTRUCTIONS    SAFETY  BECAUSE OF THE AFTEREFFECTS OF THE SEDATION YOU RECEIVED, WE ADVISE YOU TO REFRAIN FROM THE FOLLOWING ACTIVITIES FOR 24 HOURS.   1. DO NOT DRIVE OR OPERATE MACHINERY FOR 24 HOURS   2. DO NOT OPERATE APPLIANCES IF ALONE.     3.DON'T SIGN LEGAL PAPERS FOR 24 HOURS.     4. WE ADVISE THAT SOMEONE STAY WITH YOU AFTER THE PROCEDURE FOR AT LEAST 8 HOURS      DISCOMFORT: FOR GENERAL DISCOMFORT AT THE PUNCTURE, YOU MAY TAKE TYLENOL, 1-2 TABS EVERY 4-6 HOURS AS NEEDED.  DO NOT TAKE MORE THAN 4000 MG  IN 24 HOUR PERIOD.    ACTIVITY/ WOUND INSTRUCTIONS     AFFECTED ARM  DO NOT LIFT ARM ABOVE SHOULDER HEIGHT FOR 7 DAYS.                                  DO NOT  SWING ARM FOR 6 WEEKS                                DO NOT REMOVE DRESSING.  IT WILL BE REMOVED AT YOUR FOLLOW UP APPOINTMENT                                YOU MAY SHOWER IN 48 HOURS.  DO NOT REMOVE THE DRESSING FOR SHOWER. USE HIBICLENS                                        SOAP ON CHEST AND NECK AREA  FOR 1 WEEK.  FACE AWAY FROM SPRAY WHEN SHOWERING                                                                               REMOVE SLING FOR SHOWER, THEN REAPPLY AFTER  SHOWER.                                USE ICE PACK FOR 48 HOURS .                                WEAR SLING AND SWATHE FOR 96 HOURS AROUND THE CLOCK THEN ONLY WHILE SLEEPING AT NIGHT FOR 2 WEEKS.              6. CALL YOUR HEALTHCARE PROVIDER IF YOU START TO HAVE THE FOLLOWING SYMPTOMS:                       1. High fever (101 degrees or higher)                 2. Redness,drainage or swelling  or intense pain at the incision site       3.  Drowsiness that doesn't get better       4. Weakness or dizziness that doesn't get better            5. Repeated vomiting                                                                                                                  NOZIN INSTRUCTIONS     GOAL: TO REDUCE THE RISK OF POST-PROCEDURAL INFECTIONS BY BACTERIA IN THE NASAL CAVITY.  THINK OF IT AS A HAND  FOR YOUR NOSE.       HOW TO USE:  1. SHAKE NOZIN BOTTLE WELL                            2. TAKE A COTTON SWAB AND APPLY FOUR DROPS TO TIP.                            3. INSERT COTTON SWAB INTO ONE NOSTRIL, BEING SURE NOT TO GO DEEPER INTO NOSE THAN THE TIP OF THE SWAB.                            4.SWAB NOSTRIL 6 TIMES CLOCKWISE AND 6 TIMES COUNERCLOCKWISE.  MAKE SURE TO SWAB THE INSIDE FRONT POCKET OF NOSTRIL.                             5. TAKE SWAB OUT AND APPLY 2 DROPS TO THE SAME COTTON TIP.  REPEAT STEPS 3 AND 4 IN THE OTHER NOSTRIL      DO STEPS 1-5 TWICE A DAY FOR 7 DAYS.

## 2022-10-27 NOTE — H&P
HPI  New patient to me.  Referred by Dr Johns for evaluation and management of CHF/ICD/PAF   --   Background as gleaned from patient's records and today's interview :  81 y.o. male with h/o hypertension, CHF, AVR 2016 ([facility]), h/o colon cancer   Has had CHF since 2009- did not want ICD until now   Aortic valve replacement, no aspirin due to mild bruising   Echo from [facility] in jan 2022:  Final Impressions   1. Moderately enlarged left ventricular chamber size, moderate generalized hypokinesis,   calculated 2-D biplane volumetric ejection fraction 29%.   2. Abnormal left ventricular geometry with  eccentric left ventricular hypertrophy.   3. Moderately enlarged right ventricular chamber size, moderate-severely reduced systolic   function, estimated right ventricular systolic pressure 58 mmHg (right atrial pressure of 15   mmHg).   4. Status post 23 mm Freire II porcine aortic valve prosthesis. Mean gradient; 12 mmHg.   Trivial prosthetic and periprosthetic regurgitation.   5. Mild-moderate tricuspid valve regurgitation.   6. Normal mid ascending aorta diameter (diameter 32 mm at mid level).   7. Mildly enlarged inferior vena cava size with reduced inspiratory collapse (<50%).   8. Compared to the report of 05/16/2015 no significant change has occurred.     [facility] 2/21:  Left main 20% stenosis   Lad:  Mid LAD 50% stenosis   Left circumflex: Diffuse irregularity  Mid RCA proximal portion 50% stenosis  Distal RCA 50% stenosis   Ramus diffuse irregularity     PFTs:  3/7/2017  5:20 PM   Spirometry is abnormal. The forced vital capacity is mildly   reduced. The flows are abnormal. After bronchodilator there was   significant improvement in the FEV1. The total lung capacity is   mildly reduced. Diffusion is normal.Mixed obstructive restrictive   impairment of a mild to moderate degree. There is reversibility       Latest Reference Range & Units 07/27/22 10:04 08/05/22 12:14 08/17/22 12:33   BNP 0 - 99 pg/mL 2,819  (H) 1,369 (H) 1,441 (H)        Latest Reference Range & Units 08/31/22 09:39   Sodium 136 - 145 mmol/L 137   Potassium 3.5 - 5.1 mmol/L 4.5   Chloride 95 - 110 mmol/L 99   CO2 23 - 29 mmol/L 27   Anion Gap 8 - 16 mmol/L 11   BUN 8 - 23 mg/dL 34 (H)   Creatinine 0.5 - 1.4 mg/dL 1.6 (H)   eGFR >60 mL/min/1.73 m^2 43.6 !   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  His symptoms of dyspnea and orthopnea have somewhat abated.    He tried Entresto and he says that he was allergic to it (itching around the eyes). However, he is on Benicar - he does have a rash on his back.   His BPs at home were rather soft in jan  Last week 102/61 to 136/81.      Has had a DCCV in 3/2021  Has been with worse CHF Sx since 10/2021.   >>  Add tissue Doppler indices to echocardiogram.  Review and decide whether CRT may be helpful.  If he has significant dyssynchrony, we may consider CRT.  If not, we may consider other heart failure devices such as Barostim or CCM.  6 minute walk now  Chest x-ray PA and lateral.  Start SGLT2 inhibitor 10 mg per day.  Obtain BMP in 1 week.  Patient has the rash on his back and may be drug related.  Will refer to dermatology for evaluation.    Patient is supposed to have an allergic reaction to ARB.  Will refer to allergy because we are interested in starting Entresto if at all possible.     Update after w/up:  No dyssynchrony noted.  He is tolerating SGLT2 inhibitor.          Current Outpatient Medications   Medication Sig    carvediloL (COREG) 12.5 MG tablet Take 2 tablets (25 mg total) by mouth 2 (two) times daily with meals.    co-enzyme Q-10 50 mg capsule Take 50 mg by mouth once daily.    LORazepam (ATIVAN) 0.5 MG tablet Take 0.5 mg by  mouth 2 (two) times daily.    olmesartan (BENICAR) 40 MG tablet Take 20 mg by mouth once daily.    rivaroxaban (XARELTO) 20 mg Tab Take 20 mg by mouth daily with dinner or evening meal.    sildenafiL (VIAGRA) 100 MG tablet Take 100 mg by mouth.    spironolactone (ALDACTONE) 25 MG tablet      triamcinolone acetonide 0.1% (KENALOG) 0.1 % cream AAA bid    tumeric-ging-olive-oreg-capryl 100 mg-150 mg- 50 mg-150 mg Cap Take by mouth.    zolpidem (AMBIEN) 5 MG Tab      dapagliflozin (FARXIGA) 10 mg tablet Take 1 tablet (10 mg total) by mouth once daily.    torsemide (DEMADEX) 20 MG Tab Take 20 mg by mouth once daily.      No current facility-administered medications for this visit.      Review of Systems   Constitutional: Positive for malaise/fatigue and weight gain. Negative for decreased appetite and weight loss.   Eyes:  Negative for blurred vision.   Cardiovascular:  Positive for dyspnea on exertion and leg swelling. Negative for chest pain, claudication, cyanosis, irregular heartbeat, near-syncope, orthopnea and palpitations.   Respiratory:  Positive for cough and sputum production. Negative for shortness of breath, sleep disturbances due to breathing, snoring and wheezing.    Endocrine: Negative for heat intolerance.   Hematologic/Lymphatic: Does not bruise/bleed easily.   Musculoskeletal:  Positive for arthritis, joint pain, muscle cramps and muscle weakness. Negative for myalgias.   Gastrointestinal:  Negative for melena, nausea and vomiting.   Genitourinary:  Negative for nocturia.   Neurological:  Positive for excessive daytime sleepiness, focal weakness, light-headedness, loss of balance, numbness and weakness. Negative for dizziness and headaches.   Psychiatric/Behavioral:  Negative for depression, memory loss and substance abuse. The patient does not have insomnia and is not nervous/anxious.          Social History          Tobacco Use   Smoking Status Former   Smokeless Tobacco Never       reports current  alcohol use.        Past Medical History:   Diagnosis Date    CHF (congestive heart failure)      Hypertension              Family History   Problem Relation Age of Onset    Hypertension Mother      Colon cancer Father        Social History           Socioeconomic History    Marital status:    Tobacco Use    Smoking status: Former    Smokeless tobacco: Never   Substance and Sexual Activity    Alcohol use: Yes    Drug use: Never            Past Surgical History:   Procedure Laterality Date    AORTIC VALVE REPLACEMENT   2016    colon section removal             Objective:   Physical Exam  Vitals and nursing note reviewed.   Constitutional:       Appearance: Normal appearance. He is well-developed. He is not diaphoretic.   HENT:      Head: Normocephalic and atraumatic.      Right Ear: External ear normal.      Left Ear: External ear normal.   Eyes:      General:         Right eye: No discharge.         Left eye: No discharge.      Conjunctiva/sclera: Conjunctivae normal.      Right eye: Right conjunctiva is not injected.      Left eye: No hemorrhage.     Pupils: Pupils are equal, round, and reactive to light.   Neck:      Thyroid: No thyromegaly.      Vascular: No JVD.   Cardiovascular:      Rate and Rhythm: Normal rate and regular rhythm.      Chest Wall: PMI is not displaced.      Pulses: Intact distal pulses.           Carotid pulses are 2+ on the right side and 2+ on the left side.       Radial pulses are 2+ on the right side and 2+ on the left side.        Dorsalis pedis pulses are 2+ on the right side and 2+ on the left side.        Posterior tibial pulses are 2+ on the right side and 2+ on the left side.      Heart sounds: Normal heart sounds. No midsystolic click and no opening snap. No murmur heard.    No friction rub. No gallop.   Pulmonary:      Effort: Pulmonary effort is normal. No respiratory distress.      Breath sounds: Normal breath sounds. No wheezing or rales.   Chest:      Chest wall: No  "tenderness.   Abdominal:      Palpations: Abdomen is soft. There is no hepatomegaly.      Tenderness: There is no abdominal tenderness. There is no guarding or rebound.   Musculoskeletal:         General: No tenderness. Normal range of motion.      Cervical back: Neck supple.      Right knee: No swelling.      Left knee: No swelling.      Right lower leg: No swelling.      Left lower leg: No swelling.      Right ankle: No swelling.      Left ankle: No swelling.      Right foot: No swelling.      Left foot: No swelling.   Skin:     General: Skin is warm and dry.      Findings: No rash.              Comments: Punctate rash over the left side of his back.   Neurological:      Mental Status: He is alert and oriented to person, place, and time.      Cranial Nerves: No cranial nerve deficit.      Coordination: Coordination normal.      Deep Tendon Reflexes: Reflexes are normal and symmetric.   Psychiatric:         Behavior: Behavior normal.      BP (!) 148/100   Pulse 77   Ht 5' 9" (1.753 m)   Wt 74.4 kg (164 lb 0.4 oz)   SpO2 98%   BMI 24.22 kg/m²      Assessment:    He is a good candidate in theory for ICD implantation.  I am not sure how decreased his is functionality.  He seems to me to be closer to class 3 than class 2.  Still, there may be a statistical benefit the having an ICD implant.  Also, he is probably not on maximum tolerable medical therapy.  Will try to advance this.     1. Chronic systolic congestive heart failure    2. RBBB plus LA hemiblock    3. S/P AVR (aortic valve replacement)    4. Hypertension, unspecified type    5. Congestive heart failure, unspecified HF chronicity, unspecified heart failure type          Plan:       ICD implant for primary prevention of  SCD (Bio VDx).   I have discussed the ICD implant procedure in detail with the patient. I described its benefits and risks. I reviewed alternative therapies and discussed their potential value. The patient was given ample opportunity to " express concerns and ask questions and I provided appropriate responses and  answers to such.The patient understands and agrees to proceed.  Consent form was signed by patient and myself and appropriately witnessed.

## 2022-10-27 NOTE — ANESTHESIA PREPROCEDURE EVALUATION
10/27/2022  Edmund Chu is a 82 y.o., male.      Pre-op Assessment    I have reviewed the Patient Summary Reports.     I have reviewed the Nursing Notes. I have reviewed the NPO Status.   I have reviewed the Medications.     Review of Systems  Anesthesia Hx:  Denies Family Hx of Anesthesia complications.   Denies Personal Hx of Anesthesia complications.   Cardiovascular:   Exercise tolerance: poor Hypertension Valvular problems/Murmurs CHF NAVARRO        Physical Exam  General: Well nourished    Airway:  Mallampati: I   Mouth Opening: Normal  TM Distance: Normal  Tongue: Normal  Neck ROM: Normal ROM    Chest/Lungs:  Clear to auscultation    Heart:  Rate: Bradycardia  Rhythm: Regular Rhythm  Sounds: Normal        Anesthesia Plan  Type of Anesthesia, risks & benefits discussed:    Anesthesia Type: MAC  Intra-op Monitoring Plan: Standard ASA Monitors  Post Op Pain Control Plan: multimodal analgesia  Informed Consent: Informed consent signed with the Patient and all parties understand the risks and agree with anesthesia plan.  All questions answered.   ASA Score: 3    Ready For Surgery From Anesthesia Perspective.     .

## 2022-10-27 NOTE — Clinical Note
The chest was prepped. The site was prepped with ChloraPrep. The site was clipped. The patient was draped. The patient was positioned supine.

## 2022-10-27 NOTE — Clinical Note
The lead was inserted under fluorscopic guidance, thresholds were tested and was secured in place. The lead was inserted in the right ventricle.

## 2022-10-27 NOTE — TRANSFER OF CARE
"Anesthesia Transfer of Care Note    Patient: Edmund CALLES Bandyomaira    Procedure(s) Performed: Procedure(s) (LRB):  INSERTION, ICD, DUAL CHAMBER (Left)    Patient location: Cath Lab    Anesthesia Type: MAC    Post pain: adequate analgesia    Post assessment: no apparent anesthetic complications    Post vital signs: stable    Level of consciousness: awake and alert    Nausea/Vomiting: no nausea/vomiting    Complications: none    Transfer of care protocol was followed      Last vitals:   Visit Vitals  /85 (BP Location: Left arm, Patient Position: Lying)   Pulse 77   Temp 36.9 °C (98.4 °F) (Temporal)   Resp 18   Ht 5' 9" (1.753 m)   Wt 76.7 kg (169 lb)   SpO2 100%   BMI 24.96 kg/m²     "

## 2022-10-28 ENCOUNTER — TELEPHONE (OUTPATIENT)
Dept: CARDIOLOGY | Facility: CLINIC | Age: 82
End: 2022-10-28
Payer: MEDICARE

## 2022-10-28 NOTE — TELEPHONE ENCOUNTER
"Received call from patient to verify no need for Hibiclens now that ICD implant complete and "to thank everyone for experience."  Advised will receive further instructions after visit on Monday and stressed do not remove dressing.  "

## 2022-10-28 NOTE — NURSING
Pt being discharged home in stable condition. IV removed, catheter intact, pt tolerated well. L chest  dressing CDI at time of discharge. PT able to ambulate, eat and drink without complication at time of discharge. VSS. Discharge instructions given to pt, pt verbalized understanding.

## 2022-10-29 ENCOUNTER — NURSE TRIAGE (OUTPATIENT)
Dept: ADMINISTRATIVE | Facility: CLINIC | Age: 82
End: 2022-10-29
Payer: MEDICARE

## 2022-10-30 ENCOUNTER — NURSE TRIAGE (OUTPATIENT)
Dept: ADMINISTRATIVE | Facility: CLINIC | Age: 82
End: 2022-10-30
Payer: MEDICARE

## 2022-10-30 NOTE — TELEPHONE ENCOUNTER
Pt c/o L shoulder pain s/o ICD placement to same extermity. Pt c/o 9/10 pain and would like to know if he can double his pain medication. On call provider Dr. Hou contacted, stated that pt can extra dose of pain medication. Stated that pt can take an additional dose in 1 hour.  Pt advised per protocol and verbalized understanding.   Reason for Disposition   [1] SEVERE post-op pain (e.g., excruciating, pain scale 8-10) AND [2] not controlled with pain medications    Additional Information   Negative: Sounds like a life-threatening emergency to the triager   Negative: [1] Widespread rash AND [2] bright red, sunburn-like   Negative: [1] SEVERE headache AND [2] after spinal (epidural) anesthesia   Negative: [1] Vomiting AND [2] persists > 4 hours   Negative: [1] Vomiting AND [2] abdomen looks much more swollen than usual   Negative: [1] Drinking very little AND [2] dehydration suspected (e.g., no urine > 12 hours, very dry mouth, very lightheaded)   Negative: Patient sounds very sick or weak to the triager   Negative: Sounds like a serious complication to the triager   Negative: Fever > 100.4 F (38.0 C)    Protocols used: Post-Op Symptoms and [id number]

## 2022-10-31 ENCOUNTER — HOSPITAL ENCOUNTER (OUTPATIENT)
Dept: CARDIOLOGY | Facility: HOSPITAL | Age: 82
Discharge: HOME | End: 2022-10-31
Attending: INTERNAL MEDICINE
Payer: MEDICARE

## 2022-10-31 VITALS
BODY MASS INDEX: 25.03 KG/M2 | HEIGHT: 69 IN | SYSTOLIC BLOOD PRESSURE: 132 MMHG | TEMPERATURE: 98 F | OXYGEN SATURATION: 100 % | DIASTOLIC BLOOD PRESSURE: 81 MMHG | HEART RATE: 78 BPM | RESPIRATION RATE: 16 BRPM | WEIGHT: 169 LBS

## 2022-10-31 DIAGNOSIS — I50.42 CHRONIC COMBINED SYSTOLIC AND DIASTOLIC CONGESTIVE HEART FAILURE: ICD-10-CM

## 2022-10-31 DIAGNOSIS — Z95.810 ICD (IMPLANTABLE CARDIOVERTER-DEFIBRILLATOR) IN PLACE: ICD-10-CM

## 2022-10-31 PROCEDURE — 93282 CARDIAC DEVICE CHECK - IN CLINIC & HOSPITAL: ICD-10-PCS | Mod: 26,,, | Performed by: INTERNAL MEDICINE

## 2022-10-31 PROCEDURE — 93282 PRGRMG EVAL IMPLANTABLE DFB: CPT | Mod: 26,,, | Performed by: INTERNAL MEDICINE

## 2022-10-31 PROCEDURE — 93280 PM DEVICE PROGR EVAL DUAL: CPT

## 2022-10-31 NOTE — TELEPHONE ENCOUNTER
Pt states no BM for 4 days.  Care advice states to see a provider within 24 hours.  Appointment is scheduled for 3:15 tomorrow.  All questions answered, advised to call back for any worsening symptoms.     Reason for Disposition   Last bowel movement (BM) > 4 days ago    Additional Information   Negative: Patient sounds very sick or weak to the triager   Negative: [1] Vomiting AND [2] abdomen looks much more swollen than usual   Negative: [1] Vomiting AND [2] contains bile (green color)   Negative: [1] Constant abdominal pain AND [2] present > 2 hours   Negative: [1] Rectal pain or fullness from fecal impaction (rectum full of stool) AND [2] NOT better after SITZ bath, suppository or enema   Negative: [1] Intermittent mild abdominal pain AND [2] fever   Negative: Abdomen is more swollen than usual    Protocols used: Constipation-A-AH

## 2022-11-03 ENCOUNTER — TELEPHONE (OUTPATIENT)
Dept: CARDIOLOGY | Facility: HOSPITAL | Age: 82
End: 2022-11-03
Payer: MEDICARE

## 2022-11-03 NOTE — TELEPHONE ENCOUNTER
Called pt in reference to message below from cardio about pt not receiving a discharge summary. Left VM with direct device clinic numbers letting pt know there is not a discharge summary to supply if he doesn't see a provider, but we're able to answer any questions he has.         ----- Message from Cristin Wadsworth MA sent at 11/3/2022  9:44 AM CDT -----  FYI:Good morning I received a call from this patient states that he was seen in clinic recently but didn't received his discharge summary. He is requesting information/instructions about his visit.

## 2022-11-18 ENCOUNTER — HOSPITAL ENCOUNTER (OUTPATIENT)
Dept: RADIOLOGY | Facility: HOSPITAL | Age: 82
Discharge: HOME | End: 2022-11-18
Attending: STUDENT IN AN ORGANIZED HEALTH CARE EDUCATION/TRAINING PROGRAM
Payer: MEDICARE

## 2022-11-18 ENCOUNTER — OFFICE VISIT (OUTPATIENT)
Dept: CARDIOLOGY | Facility: CLINIC | Age: 82
End: 2022-11-18
Payer: MEDICARE

## 2022-11-18 VITALS
WEIGHT: 158.75 LBS | BODY MASS INDEX: 23.51 KG/M2 | DIASTOLIC BLOOD PRESSURE: 80 MMHG | HEART RATE: 60 BPM | HEIGHT: 69 IN | SYSTOLIC BLOOD PRESSURE: 110 MMHG | OXYGEN SATURATION: 98 %

## 2022-11-18 DIAGNOSIS — Z88.9 H/O MULTIPLE ALLERGIES: ICD-10-CM

## 2022-11-18 DIAGNOSIS — I48.0 PAF (PAROXYSMAL ATRIAL FIBRILLATION): ICD-10-CM

## 2022-11-18 DIAGNOSIS — Z95.2 HISTORY OF AORTIC VALVE REPLACEMENT: ICD-10-CM

## 2022-11-18 DIAGNOSIS — Z95.810 ICD (IMPLANTABLE CARDIOVERTER-DEFIBRILLATOR) IN PLACE: ICD-10-CM

## 2022-11-18 DIAGNOSIS — R06.02 SOB (SHORTNESS OF BREATH): ICD-10-CM

## 2022-11-18 DIAGNOSIS — I45.2 RBBB PLUS LA HEMIBLOCK: ICD-10-CM

## 2022-11-18 DIAGNOSIS — I50.42 CHRONIC COMBINED SYSTOLIC AND DIASTOLIC CONGESTIVE HEART FAILURE: ICD-10-CM

## 2022-11-18 DIAGNOSIS — Z95.810 ICD (IMPLANTABLE CARDIOVERTER-DEFIBRILLATOR) IN PLACE: Primary | ICD-10-CM

## 2022-11-18 DIAGNOSIS — I10 HYPERTENSION, UNSPECIFIED TYPE: ICD-10-CM

## 2022-11-18 PROCEDURE — 71046 X-RAY EXAM CHEST 2 VIEWS: CPT | Mod: TC,FY,PO

## 2022-11-18 PROCEDURE — 71046 XR CHEST PA AND LATERAL: ICD-10-PCS | Mod: 26,,, | Performed by: STUDENT IN AN ORGANIZED HEALTH CARE EDUCATION/TRAINING PROGRAM

## 2022-11-18 PROCEDURE — 99214 OFFICE O/P EST MOD 30 MIN: CPT | Mod: S$PBB,,, | Performed by: STUDENT IN AN ORGANIZED HEALTH CARE EDUCATION/TRAINING PROGRAM

## 2022-11-18 PROCEDURE — 71046 X-RAY EXAM CHEST 2 VIEWS: CPT | Mod: 26,,, | Performed by: STUDENT IN AN ORGANIZED HEALTH CARE EDUCATION/TRAINING PROGRAM

## 2022-11-18 PROCEDURE — 99999 PR PBB SHADOW E&M-EST. PATIENT-LVL III: CPT | Mod: PBBFAC,,, | Performed by: STUDENT IN AN ORGANIZED HEALTH CARE EDUCATION/TRAINING PROGRAM

## 2022-11-18 PROCEDURE — 99999 PR PBB SHADOW E&M-EST. PATIENT-LVL III: ICD-10-PCS | Mod: PBBFAC,,, | Performed by: STUDENT IN AN ORGANIZED HEALTH CARE EDUCATION/TRAINING PROGRAM

## 2022-11-18 PROCEDURE — 99214 PR OFFICE/OUTPT VISIT, EST, LEVL IV, 30-39 MIN: ICD-10-PCS | Mod: S$PBB,,, | Performed by: STUDENT IN AN ORGANIZED HEALTH CARE EDUCATION/TRAINING PROGRAM

## 2022-11-18 PROCEDURE — 99213 OFFICE O/P EST LOW 20 MIN: CPT | Mod: PBBFAC,25,PO | Performed by: STUDENT IN AN ORGANIZED HEALTH CARE EDUCATION/TRAINING PROGRAM

## 2022-11-18 NOTE — PROGRESS NOTES
Section of Cardiology                  Cardiac Clinic Note    Chief Complaint/Reason for consultation:  Establish care      HPI:   Edmund Chu is a 82 y.o. male with h/o hypertension, CHF, AVR 2016 ([facility]), h/o colon cancerwho comes in to Cardiology Clinic to establish care.    7/27/2022  Per Care everywhere, last echocardiogram March 2021 with redo use LV function, right heart failure, moderate mitral regurgitation, normal functioning bioprosthetic aortic valve    Comes in with wife   Only coming in for ICD evaluation   Still seeing Dr. Viveros  Has had CHF since 2009- did not want ICD until now    Aortic valve replacement, no aspirin due to mild bruising     Reports heart issues since 2009- had colon cancer s/p resection     Walks regularly, 1.2 miles daily, worsening SOB  Recent orthopnea last few days  Reports leg swelling- recently needed to take torsemide which he doesn't take it regularly   He's a vegetarian, does not use salt  Trying to watch fluid intake     Family hx: cousin: ICD, afib   Stopped smoking at age of 30 yo. ETOH occ    8/31/22  BNP level was elevated, torsemide 100 mg daily increased to 200 mg daily at the time  Repeat BNP level was elevated, CMP showed stable renal function  Patient now back down to 100 mg   Dropped 6lbs since last visit  Reports daily weights, fluid and salt restriction  Has not taken Xarelto in fear of bleeding if he cuts himself  Blood pressure readings normotensive at home ranges a 110 to 135 systolic    Denies chest pain, shortness of breath, dizziness, palpitations, orthopnea, PND      10/7/22  Saw Dr. Wong, will be having ICD placement- but patient wants to wait  Reports rash/itching, unsure of which medication is causing it  Has seen dermatology  Switched torsemide to lasix due to itching, however, itching has not improved  BP elevated  Echo pending     Denies chest pain, shortness of breath, dizziness, palpitations, orthopnea,  PND      11/8/22  ICD implanted on 10/27/22  Reports cough and SOB, sputum production  Denies LE swelling, PND  Recently started back on diuretics, torsemide 20  No longer taking farxiga, spironolactone   Believes itching was due to wine, has since stopped   Did not see allergy/imm      EKG 8/31/2022 NSR, RBBB, LAFB, PVCs,  ms   EKG 7/27/2022 NSR, RBBB, LAFB, inferior infarct, qtc 529 ms          Prior records  Stress test 12/2020 small reversible defect, apical segment.  Medium perfusion defect inferior wall  LHC 2/21:  Left main 20% stenosis   Lad:  Mid LAD 50% stenosis   Left circumflex: Diffuse irregularity  Mid RCA proximal portion 50% stenosis  Distal RCA 50% stenosis   Ramus diffuse irregularity  Echo 11/21 EF 30%, RV function is severely decreased      Echo 10/22  Concentric hypertrophy and severely decreased systolic function.  Severe left atrial enlargement.  The estimated ejection fraction is 20%.  Left ventricular diastolic dysfunction.  Normal right ventricular size with normal right ventricular systolic function.  Mild tricuspid regurgitation.      ECHO  3/21 (Care everywhere)  CONCLUSIONS:   - Exam indication: Initial evaluation of known cardiomyopathy   - The left ventricle is mildly dilated. There is left ventricular hypertrophy.   Left ventricular systolic function is severely decreased. EF = 26 ± 5% (2D 4-ch.)   Definity contrast used for endocardial border detection.   - The right ventricle is dilated. Right ventricular systolic function is   moderately decreased.   - The left atrial cavity is severely dilated.   - The right atrial cavity is dilated.   - There is moderate (2+ - 3+) mitral valve regurgitation due to restricted leaflet    motion and apical tethering of normal mitral leaflet caused by LV enlargement.   Regurgitant orifice area (PISA) is 0.24 cm².   - Porcine prosthetic aortic valve. There is trace aortic valve regurgitation. The   peak gradient is 10 mmHg, the mean gradient  is 5 mmHg and the dimensionless valve   index is 0.31.   - Estimated right ventricular systolic pressure is 42 mmHg plus right atrial   pressure. Estimated right atrial pressure is not included as the IVC was not seen.   - The patient has not had a prior CC echocardiographic exam for comparison.       STRESS TEST    [facility]      ROS: All 10 systems reviewed. Please refer to the HPI for pertinent positives. All other systems negative.     Past Medical History  Past Medical History:   Diagnosis Date    CHF (congestive heart failure)     Hypertension        Surgical History  Past Surgical History:   Procedure Laterality Date    AORTIC VALVE REPLACEMENT  2016    colon section removal            Allergies:   Review of patient's allergies indicates:   Allergen Reactions    Sacubitril-valsartan Hives and Swelling    Amlodipine Edema     Ankle    Lactose Other (See Comments)     GI distress    Lisinopril Other (See Comments)    Mefloquine Hives and Itching     Anti malarial         Social History:  Social History     Socioeconomic History    Marital status:    Tobacco Use    Smoking status: Former    Smokeless tobacco: Never   Substance and Sexual Activity    Alcohol use: Yes    Drug use: Never       Family History:  family history includes Colon cancer in his father; Hypertension in his mother.    Home Medications:  Current Outpatient Medications on File Prior to Visit   Medication Sig Dispense Refill    carvediloL (COREG) 12.5 MG tablet Take 2 tablets (25 mg total) by mouth 2 (two) times daily with meals. 60 tablet 11    cefadroxil (DURICEF) 500 MG Cap Take 1 capsule (500 mg total) by mouth every 12 (twelve) hours. Take 2 caps tonight @ 8 pm then 1 caps every 12 hrs until done. 6 capsule 0    co-enzyme Q-10 50 mg capsule Take 50 mg by mouth once daily.      HYDROcodone-acetaminophen (NORCO)  mg per tablet Take 1 tablet by mouth every 6 (six) hours as needed for Pain. Please use sparingly. Start with 1/2 tablet  "per dose - it may be sufficient. 28 tablet 0    LORazepam (ATIVAN) 0.5 MG tablet Take 0.5 mg by mouth 2 (two) times daily.      olmesartan (BENICAR) 40 MG tablet Take 20 mg by mouth once daily.      sildenafiL (VIAGRA) 100 MG tablet Take 100 mg by mouth.      spironolactone (ALDACTONE) 25 MG tablet       triamcinolone acetonide 0.1% (KENALOG) 0.1 % cream AAA bid 454 g 1    tumeric-ging-olive-oreg-capryl 100 mg-150 mg- 50 mg-150 mg Cap Take by mouth.      zolpidem (AMBIEN) 5 MG Tab       [DISCONTINUED] furosemide (LASIX) 40 MG tablet Take 40 mg by mouth once daily.       No current facility-administered medications on file prior to visit.       Physical exam:  /80 (BP Location: Left arm, Patient Position: Sitting, BP Method: Medium (Manual))   Pulse 60   Ht 5' 9" (1.753 m)   Wt 72 kg (158 lb 11.7 oz)   SpO2 98%   BMI 23.44 kg/m²         General: Pt is a 82 y.o. year old male who is AAOx3, in NAD, is pleasant, well nourished, looks stated age  HEENT: PERRL, EOMI, Oral mucosa pink & moist  CVS  No abnormal cardiac pulsations noted on inspection. JVP not raised. The apical impulse is normal on palpation, and is located in the left 5th intercostal space in the mid - clavicular line. No palpable thrills or abnormal pulsations noted. RR, S1 - S2 heard, no murmurs, rubs or gallops appreciated.   PUL :  Decreased breath sounds on the right  ABD : BS +, soft. No tenderness elicited   LE : No C/C/E. Distal Pulses palpable B/L         LABS:    Chemistry:   Lab Results   Component Value Date     10/06/2022     10/06/2022    K 4.9 10/06/2022    K 4.9 10/06/2022    CL 99 10/06/2022    CL 99 10/06/2022    CO2 28 10/06/2022    CO2 28 10/06/2022    BUN 32 (H) 10/06/2022    BUN 32 (H) 10/06/2022    CREATININE 1.7 (H) 10/06/2022    CREATININE 1.7 (H) 10/06/2022    CALCIUM 9.8 10/06/2022    CALCIUM 9.8 10/06/2022     Cardiac Markers: No results found for: CKTOTAL, CKMB, CKMBINDEX, TROPONINI  Cardiac Markers (Last " 3): No results found for: CKTOTAL, CKMB, CKMBINDEX, TROPONINI  CBC:   Lab Results   Component Value Date    WBC 5.68 10/06/2022    HGB 14.0 10/06/2022    HCT 43.6 10/06/2022    MCV 98 10/06/2022     (L) 10/06/2022     Lipids: No results found for: CHOL, TRIG, HDL, LDLDIRECT  Coagulation:   Lab Results   Component Value Date    INR 1.2 10/06/2022    APTT 27.8 10/06/2022           Assessment      1. ICD (implantable cardioverter-defibrillator) in place    2. Chronic combined systolic and diastolic congestive heart failure    3. PAF (paroxysmal atrial fibrillation)    4. RBBB plus LA hemiblock    5. Hypertension, unspecified type    6. History of aortic valve replacement    7. H/O multiple allergies    8. SOB (shortness of breath)               Plan:    Shortness of breath/cough   Decreased breath sounds on the right  Obtain chest x-ray   Obtain BNP, CMP    Congestive heart failure s/p ICD  NYHA class III, stage C  Systolic heart failure with right ventricular failure  Continue carvedilol, Benicar  Restart spironolactone  Stopped entresto - due to itching - will re-refer to allergy/immunology next visit   Increase to Torsemide 40 mg (but patient wants to take 1.5 tablets first)    ICD implanted  F/u with device clinic     Aortic valve replacement  Normally function per last echo in 2021  Echo with normal EF, will review to look at valve     PAF  Would consider restarting Xarelto    Hypertension  Elevated  Enroll into digital medicine program- pending   Continue above regimen     (says Entresto had side effects-itching around eyes)      This note was prepared using voice recognition system and is likely to have sound alike errors that may have been overlooked even after proofreading.     I have reviewed all pertinent chart information.  Plans and recommendations have been formulated under my direct supervision. All questions answered and patient voiced understanding.   If symptoms persist go to the ED.    RTC in  1 month         Tere Johns MD  Cardiology

## 2022-11-21 ENCOUNTER — TELEPHONE (OUTPATIENT)
Dept: CARDIOLOGY | Facility: CLINIC | Age: 82
End: 2022-11-21
Payer: MEDICARE

## 2022-11-21 NOTE — TELEPHONE ENCOUNTER
Spoke with patient let him know that his labs have not been resulted by Dr. Johns. Will call back with results.    ----- Message from Erma Wade sent at 11/21/2022  1:44 PM CST -----  Type:  Test Results    Who Called:  patient  Name of Test (Lab/Mammo/Etc):  labs  Date of Test: 11/18/22  Ordering Provider: Dr. JOHNNY Johns  Where the test was performed:  [facility]  Would the patient rather a call back or a response via [facility]? Call back  Best Call Back Number:  [phone]  Additional Information:

## 2022-11-22 ENCOUNTER — TELEPHONE (OUTPATIENT)
Dept: CARDIOLOGY | Facility: CLINIC | Age: 82
End: 2022-11-22

## 2022-11-22 ENCOUNTER — OFFICE VISIT (OUTPATIENT)
Dept: CARDIOLOGY | Facility: CLINIC | Age: 82
End: 2022-11-22
Payer: MEDICARE

## 2022-11-22 DIAGNOSIS — I10 HYPERTENSION, UNSPECIFIED TYPE: ICD-10-CM

## 2022-11-22 DIAGNOSIS — Z88.9 H/O MULTIPLE ALLERGIES: ICD-10-CM

## 2022-11-22 DIAGNOSIS — Z95.2 HISTORY OF AORTIC VALVE REPLACEMENT: ICD-10-CM

## 2022-11-22 DIAGNOSIS — R06.02 SOB (SHORTNESS OF BREATH): ICD-10-CM

## 2022-11-22 DIAGNOSIS — I50.42 CHRONIC COMBINED SYSTOLIC AND DIASTOLIC CONGESTIVE HEART FAILURE: Primary | ICD-10-CM

## 2022-11-22 DIAGNOSIS — I45.2 RBBB PLUS LA HEMIBLOCK: ICD-10-CM

## 2022-11-22 DIAGNOSIS — Z95.2 S/P AVR (AORTIC VALVE REPLACEMENT): ICD-10-CM

## 2022-11-22 DIAGNOSIS — I48.0 PAF (PAROXYSMAL ATRIAL FIBRILLATION): ICD-10-CM

## 2022-11-22 DIAGNOSIS — Z95.810 ICD (IMPLANTABLE CARDIOVERTER-DEFIBRILLATOR) IN PLACE: ICD-10-CM

## 2022-11-22 PROCEDURE — 99213 PR OFFICE/OUTPT VISIT, EST, LEVL III, 20-29 MIN: ICD-10-PCS | Mod: 95,,, | Performed by: STUDENT IN AN ORGANIZED HEALTH CARE EDUCATION/TRAINING PROGRAM

## 2022-11-22 PROCEDURE — 99213 OFFICE O/P EST LOW 20 MIN: CPT | Mod: 95,,, | Performed by: STUDENT IN AN ORGANIZED HEALTH CARE EDUCATION/TRAINING PROGRAM

## 2022-11-22 NOTE — TELEPHONE ENCOUNTER
Pt states he is extremely SOB walking from room to room. Also states his xray was clear. Pt has multiple questions would like a call back from you. Scheduled lab work.      ----- Message from Tere Johns MD sent at 11/22/2022  8:32 AM CST -----  Order CMP on him in 1 week. Let him know to increase his torsemide to 2 tablets daily

## 2022-11-22 NOTE — PROGRESS NOTES
Section of Cardiology                  Cardiac Clinic Note    Chief Complaint/Reason for consultation:  Establish care      HPI:   Edmund Chu is a 82 y.o. male with h/o hypertension, CHF, AVR 2016 ([facility]), h/o colon cancerwho comes in to Cardiology Clinic to establish care.        The patient location is: home  The chief complaint leading to consultation is: labs review    Visit type: audiovisual    Face to Face time with patient: 15 min   15 minutes of total time spent on the encounter, which includes face to face time and non-face to face time preparing to see the patient (eg, review of tests), Obtaining and/or reviewing separately obtained history, Documenting clinical information in the electronic or other health record, Independently interpreting results (not separately reported) and communicating results to the patient/family/caregiver, or Care coordination (not separately reported).         Each patient to whom he or she provides medical services by telemedicine is:  (1) informed of the relationship between the physician and patient and the respective role of any other health care provider with respect to management of the patient; and (2) notified that he or she may decline to receive medical services by telemedicine and may withdraw from such care at any time.    Notes:       7/27/2022  Per Care everywhere, last echocardiogram March 2021 with redo use LV function, right heart failure, moderate mitral regurgitation, normal functioning bioprosthetic aortic valve    Comes in with wife   Only coming in for ICD evaluation   Still seeing Dr. Viveros  Has had CHF since 2009- did not want ICD until now    Aortic valve replacement, no aspirin due to mild bruising     Reports heart issues since 2009- had colon cancer s/p resection     Walks regularly, 1.2 miles daily, worsening SOB  Recent orthopnea last few days  Reports leg swelling- recently needed to take torsemide which he doesn't take  it regularly   He's a vegetarian, does not use salt  Trying to watch fluid intake     Family hx: cousin: ICD, afib   Stopped smoking at age of 30 yo. ETOH occ    8/31/22  BNP level was elevated, torsemide 100 mg daily increased to 200 mg daily at the time  Repeat BNP level was elevated, CMP showed stable renal function  Patient now back down to 100 mg   Dropped 6lbs since last visit  Reports daily weights, fluid and salt restriction  Has not taken Xarelto in fear of bleeding if he cuts himself  Blood pressure readings normotensive at home ranges a 110 to 135 systolic    Denies chest pain, shortness of breath, dizziness, palpitations, orthopnea, PND      10/7/22  Saw Dr. Wong, will be having ICD placement- but patient wants to wait  Reports rash/itching, unsure of which medication is causing it  Has seen dermatology  Switched torsemide to lasix due to itching, however, itching has not improved  BP elevated  Echo pending     Denies chest pain, shortness of breath, dizziness, palpitations, orthopnea, PND      11/8/22  ICD implanted on 10/27/22  Reports cough and SOB, sputum production  Denies LE swelling, PND  Recently started back on diuretics, torsemide 20  No longer taking farxiga, spironolactone   Believes itching was due to wine, has since stopped   Did not see allergy/imm      11/22/22  Virtual visit   Concerned about his labs  Chest x-ray showing cardiac enlargement, vascular congestion, no pneumonia or pneumothorax  Labs show heart failure in congestion, BNP significantly elevated   Continues to have cough with sputum production  Reports shortness of breath with ambulation  Patient to take 2 tablets of torsemide   Will repeat labs in 1 week      EKG 8/31/2022 NSR, RBBB, LAFB, PVCs,  ms   EKG 7/27/2022 NSR, RBBB, LAFB, inferior infarct, qtc 529 ms          Prior records  Stress test 12/2020 small reversible defect, apical segment.  Medium perfusion defect inferior wall  [facility] 2/21:  Left main 20%  stenosis   Lad:  Mid LAD 50% stenosis   Left circumflex: Diffuse irregularity  Mid RCA proximal portion 50% stenosis  Distal RCA 50% stenosis   Ramus diffuse irregularity  Echo 11/21 EF 30%, RV function is severely decreased      Echo 10/22  Concentric hypertrophy and severely decreased systolic function.  Severe left atrial enlargement.  The estimated ejection fraction is 20%.  Left ventricular diastolic dysfunction.  Normal right ventricular size with normal right ventricular systolic function.  Mild tricuspid regurgitation.      ECHO  3/21 (Care everywhere)  CONCLUSIONS:   - Exam indication: Initial evaluation of known cardiomyopathy   - The left ventricle is mildly dilated. There is left ventricular hypertrophy.   Left ventricular systolic function is severely decreased. EF = 26 ± 5% (2D 4-ch.)   Definity contrast used for endocardial border detection.   - The right ventricle is dilated. Right ventricular systolic function is   moderately decreased.   - The left atrial cavity is severely dilated.   - The right atrial cavity is dilated.   - There is moderate (2+ - 3+) mitral valve regurgitation due to restricted leaflet    motion and apical tethering of normal mitral leaflet caused by LV enlargement.   Regurgitant orifice area (PISA) is 0.24 cm².   - Porcine prosthetic aortic valve. There is trace aortic valve regurgitation. The   peak gradient is 10 mmHg, the mean gradient is 5 mmHg and the dimensionless valve   index is 0.31.   - Estimated right ventricular systolic pressure is 42 mmHg plus right atrial   pressure. Estimated right atrial pressure is not included as the IVC was not seen.   - The patient has not had a prior CC echocardiographic exam for comparison.       STRESS TEST    [facility]      ROS: All 10 systems reviewed. Please refer to the HPI for pertinent positives. All other systems negative.     Past Medical History  Past Medical History:   Diagnosis Date    CHF (congestive heart failure)     Hypertension         Surgical History  Past Surgical History:   Procedure Laterality Date    AORTIC VALVE REPLACEMENT  2016    colon section removal            Allergies:   Review of patient's allergies indicates:   Allergen Reactions    Sacubitril-valsartan Hives and Swelling    Amlodipine Edema     Ankle    Lactose Other (See Comments)     GI distress    Lisinopril Other (See Comments)    Mefloquine Hives and Itching     Anti malarial         Social History:  Social History     Socioeconomic History    Marital status:    Tobacco Use    Smoking status: Former    Smokeless tobacco: Never   Substance and Sexual Activity    Alcohol use: Yes    Drug use: Never       Family History:  family history includes Colon cancer in his father; Hypertension in his mother.    Home Medications:  Current Outpatient Medications on File Prior to Visit   Medication Sig Dispense Refill    carvediloL (COREG) 12.5 MG tablet Take 2 tablets (25 mg total) by mouth 2 (two) times daily with meals. 60 tablet 11    cefadroxil (DURICEF) 500 MG Cap Take 1 capsule (500 mg total) by mouth every 12 (twelve) hours. Take 2 caps tonight @ 8 pm then 1 caps every 12 hrs until done. 6 capsule 0    co-enzyme Q-10 50 mg capsule Take 50 mg by mouth once daily.      HYDROcodone-acetaminophen (NORCO)  mg per tablet Take 1 tablet by mouth every 6 (six) hours as needed for Pain. Please use sparingly. Start with 1/2 tablet per dose - it may be sufficient. 28 tablet 0    LORazepam (ATIVAN) 0.5 MG tablet Take 0.5 mg by mouth 2 (two) times daily.      olmesartan (BENICAR) 40 MG tablet Take 20 mg by mouth once daily.      sildenafiL (VIAGRA) 100 MG tablet Take 100 mg by mouth.      spironolactone (ALDACTONE) 25 MG tablet       triamcinolone acetonide 0.1% (KENALOG) 0.1 % cream AAA bid 454 g 1    tumeric-ging-olive-oreg-capryl 100 mg-150 mg- 50 mg-150 mg Cap Take by mouth.      zolpidem (AMBIEN) 5 MG Tab        No current facility-administered medications on file prior  to visit.       Physical exam:  There were no vitals taken for this visit.        General: Pt is a 82 y.o. year old male who is AAOx3, in NAD, is pleasant, well nourished, looks stated age          LABS:    Chemistry:   Lab Results   Component Value Date     11/18/2022    K 5.1 11/18/2022    CL 97 11/18/2022    CO2 27 11/18/2022    BUN 47 (H) 11/18/2022    CREATININE 1.9 (H) 11/18/2022    CALCIUM 10.1 11/18/2022     Cardiac Markers: No results found for: CKTOTAL, CKMB, CKMBINDEX, TROPONINI  Cardiac Markers (Last 3): No results found for: CKTOTAL, CKMB, CKMBINDEX, TROPONINI  CBC:   Lab Results   Component Value Date    WBC 5.68 10/06/2022    HGB 14.0 10/06/2022    HCT 43.6 10/06/2022    MCV 98 10/06/2022     (L) 10/06/2022     Lipids: No results found for: CHOL, TRIG, HDL, LDLDIRECT  Coagulation:   Lab Results   Component Value Date    INR 1.2 10/06/2022    APTT 27.8 10/06/2022           Assessment      1. Chronic combined systolic and diastolic congestive heart failure    2. Hypertension, unspecified type    3. ICD (implantable cardioverter-defibrillator) in place    4. PAF (paroxysmal atrial fibrillation)    5. RBBB plus LA hemiblock    6. History of aortic valve replacement    7. H/O multiple allergies    8. SOB (shortness of breath)    9. S/P AVR (aortic valve replacement)                 Plan:    Shortness of breath/cough   CHF exacerbation  Continue torsemide     Congestive heart failure s/p ICD  NYHA class III, stage C  Systolic heart failure with right ventricular failure  Continue carvedilol, Benicar  Hold spironolactone for now, K at upper limit of normal   Stopped entresto - due to itching - will re-refer to allergy/immunology next visit   taking Torsemide 40 mg   Repeat CMP and BNP in 1 week     ICD implanted  F/u with device clinic     Aortic valve replacement  Normally function per last echo in 2021  Echo with normal EF, will review to look at valve     PAF  Would consider restarting  Xarelto    Hypertension  Continue above regimen     (says Entresto had side effects-itching around eyes)      This note was prepared using voice recognition system and is likely to have sound alike errors that may have been overlooked even after proofreading.     I have reviewed all pertinent chart information.  Plans and recommendations have been formulated under my direct supervision. All questions answered and patient voiced understanding.   If symptoms persist go to the ED.    RTC in 1 month         Tere Johns MD  Cardiology

## 2022-11-28 ENCOUNTER — TELEPHONE (OUTPATIENT)
Dept: CARDIOLOGY | Facility: CLINIC | Age: 82
End: 2022-11-28

## 2022-11-28 NOTE — TELEPHONE ENCOUNTER
Please advise     ----- Message from Chelsea Cullen sent at 11/28/2022  7:22 AM CST -----  Contact: Edmund  Pt states that he is taking torsemide, but the medication is causing severe knee pain.  He wants to know if the meds can be changed. Please call him back at [phone].          Thanks  DD

## 2022-12-12 ENCOUNTER — OFFICE VISIT (OUTPATIENT)
Dept: CARDIOLOGY | Facility: CLINIC | Age: 82
End: 2022-12-12
Payer: MEDICARE

## 2022-12-12 ENCOUNTER — LAB VISIT (OUTPATIENT)
Dept: LAB | Facility: HOSPITAL | Age: 82
End: 2022-12-12
Attending: STUDENT IN AN ORGANIZED HEALTH CARE EDUCATION/TRAINING PROGRAM
Payer: MEDICARE

## 2022-12-12 VITALS
DIASTOLIC BLOOD PRESSURE: 90 MMHG | SYSTOLIC BLOOD PRESSURE: 138 MMHG | HEART RATE: 56 BPM | BODY MASS INDEX: 24.03 KG/M2 | WEIGHT: 162.69 LBS | OXYGEN SATURATION: 100 %

## 2022-12-12 VITALS
DIASTOLIC BLOOD PRESSURE: 90 MMHG | BODY MASS INDEX: 24.09 KG/M2 | WEIGHT: 162.69 LBS | HEART RATE: 56 BPM | SYSTOLIC BLOOD PRESSURE: 138 MMHG | OXYGEN SATURATION: 100 % | HEIGHT: 69 IN

## 2022-12-12 DIAGNOSIS — Z95.2 S/P AVR (AORTIC VALVE REPLACEMENT): ICD-10-CM

## 2022-12-12 DIAGNOSIS — I50.42 CHRONIC COMBINED SYSTOLIC AND DIASTOLIC CONGESTIVE HEART FAILURE: ICD-10-CM

## 2022-12-12 DIAGNOSIS — Z91.199 NONADHERENCE TO MEDICAL TREATMENT: ICD-10-CM

## 2022-12-12 DIAGNOSIS — I10 HYPERTENSION, UNSPECIFIED TYPE: ICD-10-CM

## 2022-12-12 DIAGNOSIS — I45.2 RBBB PLUS LA HEMIBLOCK: ICD-10-CM

## 2022-12-12 DIAGNOSIS — I50.42 CHRONIC COMBINED SYSTOLIC AND DIASTOLIC CONGESTIVE HEART FAILURE: Primary | ICD-10-CM

## 2022-12-12 DIAGNOSIS — Z95.810 ICD (IMPLANTABLE CARDIOVERTER-DEFIBRILLATOR) IN PLACE: ICD-10-CM

## 2022-12-12 DIAGNOSIS — Z88.9 H/O MULTIPLE ALLERGIES: ICD-10-CM

## 2022-12-12 DIAGNOSIS — I48.0 PAF (PAROXYSMAL ATRIAL FIBRILLATION): ICD-10-CM

## 2022-12-12 DIAGNOSIS — G47.33 OSA (OBSTRUCTIVE SLEEP APNEA): ICD-10-CM

## 2022-12-12 DIAGNOSIS — R06.02 SOB (SHORTNESS OF BREATH): ICD-10-CM

## 2022-12-12 LAB — BNP SERPL-MCNC: 3023 PG/ML (ref 0–99)

## 2022-12-12 PROCEDURE — 99214 PR OFFICE/OUTPT VISIT, EST, LEVL IV, 30-39 MIN: ICD-10-PCS | Mod: S$PBB,,, | Performed by: STUDENT IN AN ORGANIZED HEALTH CARE EDUCATION/TRAINING PROGRAM

## 2022-12-12 PROCEDURE — 99999 PR PBB SHADOW E&M-EST. PATIENT-LVL IV: ICD-10-PCS | Mod: PBBFAC,,, | Performed by: STUDENT IN AN ORGANIZED HEALTH CARE EDUCATION/TRAINING PROGRAM

## 2022-12-12 PROCEDURE — 36415 COLL VENOUS BLD VENIPUNCTURE: CPT | Performed by: STUDENT IN AN ORGANIZED HEALTH CARE EDUCATION/TRAINING PROGRAM

## 2022-12-12 PROCEDURE — 99024 PR POST-OP FOLLOW-UP VISIT: ICD-10-PCS | Mod: S$PBB,,, | Performed by: INTERNAL MEDICINE

## 2022-12-12 PROCEDURE — 99214 OFFICE O/P EST MOD 30 MIN: CPT | Mod: PBBFAC,27 | Performed by: INTERNAL MEDICINE

## 2022-12-12 PROCEDURE — 99999 PR PBB SHADOW E&M-EST. PATIENT-LVL IV: ICD-10-PCS | Mod: PBBFAC,,, | Performed by: INTERNAL MEDICINE

## 2022-12-12 PROCEDURE — 99024 POSTOP FOLLOW-UP VISIT: CPT | Mod: S$PBB,,, | Performed by: INTERNAL MEDICINE

## 2022-12-12 PROCEDURE — 99214 OFFICE O/P EST MOD 30 MIN: CPT | Mod: S$PBB,,, | Performed by: STUDENT IN AN ORGANIZED HEALTH CARE EDUCATION/TRAINING PROGRAM

## 2022-12-12 PROCEDURE — 99999 PR PBB SHADOW E&M-EST. PATIENT-LVL IV: CPT | Mod: PBBFAC,,, | Performed by: STUDENT IN AN ORGANIZED HEALTH CARE EDUCATION/TRAINING PROGRAM

## 2022-12-12 PROCEDURE — 99999 PR PBB SHADOW E&M-EST. PATIENT-LVL IV: CPT | Mod: PBBFAC,,, | Performed by: INTERNAL MEDICINE

## 2022-12-12 PROCEDURE — 83880 ASSAY OF NATRIURETIC PEPTIDE: CPT | Performed by: STUDENT IN AN ORGANIZED HEALTH CARE EDUCATION/TRAINING PROGRAM

## 2022-12-12 PROCEDURE — 99214 OFFICE O/P EST MOD 30 MIN: CPT | Mod: PBBFAC | Performed by: STUDENT IN AN ORGANIZED HEALTH CARE EDUCATION/TRAINING PROGRAM

## 2022-12-12 NOTE — PROGRESS NOTES
Subjective:   Patient ID:  Edmund Chu is a 82 y.o. male     Chief complaint:  CHF    HPI  New patient to me as of 09/26/2022.  Referred by Dr Johns for evaluation and management of CHF/ICD/PAF   --   Background as gleaned from patient's records and today's interview :  81 y.o. male with h/o hypertension, CHF, AVR 2016 ([facility]), h/o colon cancer   Has had CHF since 2009- did not want ICD until now   Aortic valve replacement, no aspirin due to mild bruising   Echo from [facility] in jan 2022:  Final Impressions   1. Moderately enlarged left ventricular chamber size, moderate generalized hypokinesis,   calculated 2-D biplane volumetric ejection fraction 29%.   2. Abnormal left ventricular geometry with  eccentric left ventricular hypertrophy.   3. Moderately enlarged right ventricular chamber size, moderate-severely reduced systolic   function, estimated right ventricular systolic pressure 58 mmHg (right atrial pressure of 15   mmHg).   4. Status post 23 mm Freire II porcine aortic valve prosthesis. Mean gradient; 12 mmHg.   Trivial prosthetic and periprosthetic regurgitation.   5. Mild-moderate tricuspid valve regurgitation.   6. Normal mid ascending aorta diameter (diameter 32 mm at mid level).   7. Mildly enlarged inferior vena cava size with reduced inspiratory collapse (<50%).   8. Compared to the report of 05/16/2015 no significant change has occurred.     [facility] 2/21:  Left main 20% stenosis   Lad:  Mid LAD 50% stenosis   Left circumflex: Diffuse irregularity  Mid RCA proximal portion 50% stenosis  Distal RCA 50% stenosis   Ramus diffuse irregularity     PFTs:  3/7/2017  5:20 PM   Spirometry is abnormal. The forced vital capacity is mildly   reduced. The flows are abnormal. After bronchodilator there was   significant improvement in the FEV1. The total lung capacity is   mildly reduced. Diffusion is normal.Mixed obstructive restrictive   impairment of a mild to moderate degree. There is  reversibility       Latest Reference Range & Units 07/27/22 10:04 08/05/22 12:14 08/17/22 12:33   BNP 0 - 99 pg/mL 2,819 (H) 1,369 (H) 1,441 (H)        Latest Reference Range & Units 08/31/22 09:39   Sodium 136 - 145 mmol/L 137   Potassium 3.5 - 5.1 mmol/L 4.5   Chloride 95 - 110 mmol/L 99   CO2 23 - 29 mmol/L 27   Anion Gap 8 - 16 mmol/L 11   BUN 8 - 23 mg/dL 34 (H)   Creatinine 0.5 - 1.4 mg/dL 1.6 (H)   eGFR >60 mL/min/1.73 m^2 43.6 !   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  His symptoms of dyspnea and orthopnea have somewhat abated.    He tried Entresto and he says that he was allergic to it (itching around the eyes). However, he is on Benicar - he does have a rash on his back.   His BPs at home were rather soft in jude  Last week 102/61 to 136/81.      Has had a DCCV in 3/2021  Has been with worse CHF Sx since 10/2021.   >>   He is a good candidate in theory for ICD implantation.  I am not sure how decreased his is functionality.  He seems to me to be closer to class 3 than class 2.  Still, there may be a statistical benefit the having an ICD implant.  Also, he is probably not on maximum tolerable medical therapy.  Will try to advance this.   >>  Add tissue Doppler indices to echocardiogram.  Review and decide whether CRT may be helpful.  If he has significant dyssynchrony, we may consider CRT.  If not, we may consider other heart failure devices such as Barostim or CCM.  6 minute walk now  Chest x-ray PA and lateral.  Start SGLT2 inhibitor at 10 mg per day.  Obtain BMP in 1 week.  Patient has the rash on his back and may be drug related.  Will refer to dermatology for evaluation.    Patient is supposed to have an allergic reaction to ARB.   Will refer to allergy because we are interested in starting Entresto if at all possible.     I have discussed the ICD implant procedure in detail with the patient. I described its benefits and risks. I reviewed alternative therapies and discussed their potential value. The patient was given ample opportunity to express concerns and ask questions and I provided appropriate responses and  answers to such.The patient understands and agrees to proceed.  Consent form was signed today by patient and myself and appropriately witnessed.      Update 12/12/2022:  Had an ICD implant on 10/27/2022  Bio Acticor 7 VRT-Dx [id number], [id number], PID: [id number]   ICD lead: Bio Plexa Pro MRI S-Dx 65/17, [id number]   Sub-pectoral placement    This was evaluated today and is in excellent repair.    Since then, he has taken himself off of his CHF medications due to reasons of his own.  He has also manipulated the his diuretic doses and switched back and forth between furosemide and torsemide.    He is complaining of more NAVARRO, orthopnea (equivalent to 5 pillows) and leg edema.    He is here today with his wife and daughter.  Current Outpatient Medications   Medication Sig    camphor-menthol 0.5-0.5% (SARNA) lotion Apply topically as needed for Itching.    carvediloL (COREG) 12.5 MG tablet Take 2 tablets (25 mg total) by mouth 2 (two) times daily with meals.    co-enzyme Q-10 50 mg capsule Take 50 mg by mouth once daily.    LORazepam (ATIVAN) 0.5 MG tablet Take 0.5 mg by mouth 2 (two) times daily.    olmesartan (BENICAR) 40 MG tablet Take 20 mg by mouth once daily.    sildenafiL (VIAGRA) 100 MG tablet Take 100 mg by mouth.    spironolactone (ALDACTONE) 25 MG tablet     tumeric-ging-olive-oreg-capryl 100 mg-150 mg- 50 mg-150 mg Cap Take by mouth.    zolpidem (AMBIEN) 5 MG Tab     cefadroxil (DURICEF) 500 MG Cap Take 1 capsule (500 mg total) by mouth every 12 (twelve) hours. Take 2 caps tonight @ 8 pm then 1 caps every 12 hrs until done. (Patient not  taking: Reported on 12/12/2022)    HYDROcodone-acetaminophen (NORCO)  mg per tablet Take 1 tablet by mouth every 6 (six) hours as needed for Pain. Please use sparingly. Start with 1/2 tablet per dose - it may be sufficient. (Patient not taking: Reported on 12/12/2022)    triamcinolone acetonide 0.1% (KENALOG) 0.1 % cream AAA bid (Patient not taking: Reported on 12/12/2022)     No current facility-administered medications for this visit.     Review of Systems   Constitutional: Positive for malaise/fatigue and weight gain. Negative for decreased appetite.   Eyes:  Negative for blurred vision.   Cardiovascular:  Positive for dyspnea on exertion and leg swelling. Negative for chest pain, claudication, cyanosis, irregular heartbeat, near-syncope, orthopnea and palpitations.   Respiratory:  Positive for sputum production. Negative for cough, shortness of breath, sleep disturbances due to breathing, snoring and wheezing.    Endocrine: Negative for heat intolerance.   Hematologic/Lymphatic: Does not bruise/bleed easily.   Musculoskeletal:  Positive for arthritis, joint pain and muscle weakness. Negative for myalgias.   Gastrointestinal:  Positive for dysphagia. Negative for melena, nausea and vomiting.   Genitourinary:  Positive for frequency and nocturia.   Neurological:  Positive for excessive daytime sleepiness, loss of balance, numbness and weakness. Negative for dizziness, headaches and light-headedness.   Psychiatric/Behavioral:  Negative for depression, memory loss and substance abuse. The patient does not have insomnia and is not nervous/anxious.    Social History     Tobacco Use   Smoking Status Former   Smokeless Tobacco Never        Objective:     Physical Exam  Vitals and nursing note reviewed.   Constitutional:       Appearance: Normal appearance. He is well-developed.   HENT:      Head: Normocephalic and atraumatic.      Right Ear: External ear normal.      Left Ear: External ear normal.   Eyes:       Conjunctiva/sclera: Conjunctivae normal.      Left eye: Left conjunctiva is not injected. No hemorrhage.     Pupils: Pupils are equal, round, and reactive to light.   Neck:      Thyroid: No thyromegaly.      Vascular: No JVD.   Cardiovascular:      Rate and Rhythm: Normal rate and regular rhythm.      Chest Wall: PMI is not displaced.      Pulses: Intact distal pulses.           Carotid pulses are 2+ on the right side and 2+ on the left side.       Radial pulses are 2+ on the right side and 2+ on the left side.        Dorsalis pedis pulses are 2+ on the right side and 2+ on the left side.        Posterior tibial pulses are 2+ on the right side and 2+ on the left side.      Heart sounds: No midsystolic click and no opening snap. Murmur heard.   Harsh midsystolic murmur is present with a grade of 2/6 at the upper right sternal border radiating to the neck.     No friction rub. No gallop.   Pulmonary:      Effort: Pulmonary effort is normal. No respiratory distress.      Breath sounds: Examination of the right-lower field reveals decreased breath sounds. Examination of the left-lower field reveals decreased breath sounds. Decreased breath sounds present. No wheezing or rales.   Chest:      Chest wall: No tenderness.      Comments: Device pocket is in excellent repair.  Abdominal:      Palpations: Abdomen is soft. Abdomen is not rigid. There is no hepatomegaly.      Tenderness: There is no abdominal tenderness.   Musculoskeletal:         General: No tenderness. Normal range of motion.      Cervical back: Neck supple.      Right knee: No swelling.      Left knee: No swelling.      Right lower leg: Swelling present. 3+ Pitting Edema present.      Left lower leg: Swelling present. 3+ Pitting Edema present.      Right ankle: No swelling.      Left ankle: No swelling.      Right foot: No swelling.      Left foot: No swelling.   Skin:     General: Skin is warm and dry.      Findings: No rash.   Neurological:      Mental  "Status: He is alert and oriented to person, place, and time.      Cranial Nerves: No cranial nerve deficit.      Coordination: Coordination normal.      Deep Tendon Reflexes: Reflexes are normal and symmetric.   Psychiatric:         Behavior: Behavior normal.     BP (!) 138/90   Pulse (!) 56   Ht 5' 9" (1.753 m)   Wt 73.8 kg (162 lb 11.2 oz)   SpO2 100%   BMI 24.03 kg/m²      reports current alcohol use.  Past Medical History:   Diagnosis Date    CHF (congestive heart failure)     Hypertension      Past Surgical History:   Procedure Laterality Date    AORTIC VALVE REPLACEMENT  2016    colon section removal       Family History   Problem Relation Age of Onset    Hypertension Mother     Colon cancer Father        Assessment:    His main issue is that he self medicates.  1. Chronic combined systolic and diastolic congestive heart failure    2. Nonadherence to medical treatment    3. S/P AVR (aortic valve replacement)    4. Hypertension, unspecified type    5. ICD (implantable cardioverter-defibrillator) in place        Plan:    I had a long talk with him and his family and told him that treatment with diuretics may only help symptoms but does not really change the course of the disease.  I also told him that he has been stopping all the disease altering medications and that he should expect multiple classes of drugs to be added to his regimen.  Short of that, he will continue to have complaints related to CHF.  Also told him that if he expects us to help him he needs to allow us to advance his GDM T without push back.  Since he  seems to suggest that some of the drugs may have caused allergies (his descriptions of symptoms do not suggest that) we will refer him to allergy.    In the meantime I will reinstitute Farxiga then after equilibration on this medication, would proceed to introduce Entresto (this is 1 of the drugs that he contends may be causing allergies) and if feasible, spironolactone.  I will refer " him to Ms. May to up titrate his medications.  Orders Placed This Encounter   Procedures    BNP     Standing Status:   Future     Standing Expiration Date:   2/10/2024    Basic Metabolic Panel     Standing Status:   Future     Standing Expiration Date:   12/12/2023    Basic metabolic panel     Standing Status:   Future     Number of Occurrences:   1     Standing Expiration Date:   2/10/2024    NT-Pro Natriuretic Peptide     Standing Status:   Future     Number of Occurrences:   1     Standing Expiration Date:   2/10/2024       Follow up in about 3 weeks (around 1/2/2023), or if symptoms worsen or fail to improve, for n may.    There are no discontinued medications.         Medication List with Changes/Refills   Current Medications    CAMPHOR-MENTHOL 0.5-0.5% (SARNA) LOTION    Apply topically as needed for Itching.    CARVEDILOL (COREG) 12.5 MG TABLET    Take 2 tablets (25 mg total) by mouth 2 (two) times daily with meals.    CEFADROXIL (DURICEF) 500 MG CAP    Take 1 capsule (500 mg total) by mouth every 12 (twelve) hours. Take 2 caps tonight @ 8 pm then 1 caps every 12 hrs until done.    CO-ENZYME Q-10 50 MG CAPSULE    Take 50 mg by mouth once daily.    HYDROCODONE-ACETAMINOPHEN (NORCO)  MG PER TABLET    Take 1 tablet by mouth every 6 (six) hours as needed for Pain. Please use sparingly. Start with 1/2 tablet per dose - it may be sufficient.    LORAZEPAM (ATIVAN) 0.5 MG TABLET    Take 0.5 mg by mouth 2 (two) times daily.    OLMESARTAN (BENICAR) 40 MG TABLET    Take 20 mg by mouth once daily.    SILDENAFIL (VIAGRA) 100 MG TABLET    Take 100 mg by mouth.    SPIRONOLACTONE (ALDACTONE) 25 MG TABLET        TRIAMCINOLONE ACETONIDE 0.1% (KENALOG) 0.1 % CREAM    AAA bid    TUMERIC-GING-OLIVE-OREG-CAPRYL 100 MG-150 MG- 50 MG-150 MG CAP    Take by mouth.    ZOLPIDEM (AMBIEN) 5 MG TAB

## 2022-12-13 ENCOUNTER — TELEPHONE (OUTPATIENT)
Dept: CARDIOLOGY | Facility: CLINIC | Age: 82
End: 2022-12-13
Payer: MEDICARE

## 2022-12-13 RX ORDER — DAPAGLIFLOZIN 5 MG/1
5 TABLET, FILM COATED ORAL DAILY
COMMUNITY
End: 2022-12-31 | Stop reason: DRUGHIGH

## 2022-12-16 ENCOUNTER — TELEPHONE (OUTPATIENT)
Dept: CARDIOLOGY | Facility: CLINIC | Age: 82
End: 2022-12-16
Payer: MEDICARE

## 2022-12-16 NOTE — TELEPHONE ENCOUNTER
LVM to return call to give lab results.    ----- Message from Tere Johns MD sent at 12/16/2022 12:13 PM CST -----  Call patient  Fluid level is still significantly elevated  Continue the fluid pill and increase the dose to 2 tablets daily as tolerated

## 2022-12-19 ENCOUNTER — PATIENT MESSAGE (OUTPATIENT)
Dept: CARDIOLOGY | Facility: CLINIC | Age: 82
End: 2022-12-19
Payer: MEDICARE

## 2022-12-19 ENCOUNTER — HOSPITAL ENCOUNTER (OUTPATIENT)
Dept: CARDIOLOGY | Facility: HOSPITAL | Age: 82
Discharge: HOME | End: 2022-12-19
Attending: STUDENT IN AN ORGANIZED HEALTH CARE EDUCATION/TRAINING PROGRAM
Payer: MEDICARE

## 2022-12-19 VITALS
HEIGHT: 69 IN | WEIGHT: 162 LBS | DIASTOLIC BLOOD PRESSURE: 90 MMHG | SYSTOLIC BLOOD PRESSURE: 138 MMHG | BODY MASS INDEX: 23.99 KG/M2

## 2022-12-19 DIAGNOSIS — I48.0 PAF (PAROXYSMAL ATRIAL FIBRILLATION): ICD-10-CM

## 2022-12-19 DIAGNOSIS — Z95.810 ICD (IMPLANTABLE CARDIOVERTER-DEFIBRILLATOR) IN PLACE: ICD-10-CM

## 2022-12-19 DIAGNOSIS — R06.02 SOB (SHORTNESS OF BREATH): ICD-10-CM

## 2022-12-19 DIAGNOSIS — I10 HYPERTENSION, UNSPECIFIED TYPE: ICD-10-CM

## 2022-12-19 DIAGNOSIS — Z88.9 H/O MULTIPLE ALLERGIES: ICD-10-CM

## 2022-12-19 DIAGNOSIS — I45.2 RBBB PLUS LA HEMIBLOCK: ICD-10-CM

## 2022-12-19 DIAGNOSIS — I50.42 CHRONIC COMBINED SYSTOLIC AND DIASTOLIC CONGESTIVE HEART FAILURE: ICD-10-CM

## 2022-12-19 DIAGNOSIS — Z95.2 S/P AVR (AORTIC VALVE REPLACEMENT): ICD-10-CM

## 2022-12-19 LAB
AV INDEX (PROSTH): 0.27
AV MEAN GRADIENT: 6 MMHG
AV PEAK GRADIENT: 10 MMHG
AV VALVE AREA: 0.89 CM2
AV VELOCITY RATIO: 0.26
BSA FOR ECHO PROCEDURE: 1.89 M2
CV ECHO LV RWT: 0.31 CM
DOP CALC AO PEAK VEL: 1.57 M/S
DOP CALC AO VTI: 27.4 CM
DOP CALC LVOT AREA: 3.3 CM2
DOP CALC LVOT DIAMETER: 2.05 CM
DOP CALC LVOT PEAK VEL: 0.41 M/S
DOP CALC LVOT STROKE VOLUME: 24.41 CM3
DOP CALC RVOT PEAK VEL: 0.38 M/S
DOP CALC RVOT VTI: 5.9 CM
DOP CALCLVOT PEAK VEL VTI: 7.4 CM
E WAVE DECELERATION TIME: 128.19 MSEC
E/A RATIO: 1.74
E/E' RATIO: 11.09 M/S
ECHO LV POSTERIOR WALL: 0.88 CM (ref 0.6–1.1)
EJECTION FRACTION: 15 %
FRACTIONAL SHORTENING: 3 % (ref 28–44)
INTERVENTRICULAR SEPTUM: 0.93 CM (ref 0.6–1.1)
IVRT: 72.31 MSEC
LA MAJOR: 7.66 CM
LA MINOR: 7.44 CM
LA WIDTH: 4.7 CM
LEFT ATRIUM SIZE: 5.43 CM
LEFT ATRIUM VOLUME INDEX MOD: 51.9 ML/M2
LEFT ATRIUM VOLUME INDEX: 86.6 ML/M2
LEFT ATRIUM VOLUME MOD: 98.02 CM3
LEFT ATRIUM VOLUME: 163.75 CM3
LEFT INTERNAL DIMENSION IN SYSTOLE: 5.47 CM (ref 2.1–4)
LEFT VENTRICLE DIASTOLIC VOLUME INDEX: 82.4 ML/M2
LEFT VENTRICLE DIASTOLIC VOLUME: 155.74 ML
LEFT VENTRICLE MASS INDEX: 103 G/M2
LEFT VENTRICLE SYSTOLIC VOLUME INDEX: 77 ML/M2
LEFT VENTRICLE SYSTOLIC VOLUME: 145.58 ML
LEFT VENTRICULAR INTERNAL DIMENSION IN DIASTOLE: 5.63 CM (ref 3.5–6)
LEFT VENTRICULAR MASS: 194.77 G
LV LATERAL E/E' RATIO: 8.71 M/S
LV SEPTAL E/E' RATIO: 15.25 M/S
LVOT MG: 0.41 MMHG
LVOT MV: 0.3 CM/S
MV PEAK A VEL: 0.35 M/S
MV PEAK E VEL: 0.61 M/S
MV STENOSIS PRESSURE HALF TIME: 37.18 MS
MV VALVE AREA P 1/2 METHOD: 5.92 CM2
PISA TR MAX VEL: 3.68 M/S
PV MEAN GRADIENT: 0.23 MMHG
PV PEAK VELOCITY: 1.19 CM/S
RA MAJOR: 7.46 CM
RA WIDTH: 5.99 CM
RIGHT VENTRICULAR END-DIASTOLIC DIMENSION: 4.96 CM
SINUS: 2.42 CM
STJ: 2.78 CM
TDI LATERAL: 0.07 M/S
TDI SEPTAL: 0.04 M/S
TDI: 0.06 M/S
TR MAX PG: 54 MMHG
TRICUSPID ANNULAR PLANE SYSTOLIC EXCURSION: 1.12 CM

## 2022-12-19 PROCEDURE — 93306 TTE W/DOPPLER COMPLETE: CPT

## 2022-12-19 PROCEDURE — 93306 TTE W/DOPPLER COMPLETE: CPT | Mod: 26,,, | Performed by: INTERNAL MEDICINE

## 2022-12-19 PROCEDURE — 93306 ECHO (CUPID ONLY): ICD-10-PCS | Mod: 26,,, | Performed by: INTERNAL MEDICINE

## 2022-12-22 ENCOUNTER — TELEPHONE (OUTPATIENT)
Dept: CARDIOLOGY | Facility: CLINIC | Age: 82
End: 2022-12-22
Payer: MEDICARE

## 2022-12-22 RX ORDER — TORSEMIDE 20 MG/1
30 TABLET ORAL DAILY
COMMUNITY

## 2022-12-22 RX ORDER — FLAXSEED
POWDER (GRAM) ORAL
COMMUNITY
End: 2023-01-03

## 2022-12-27 ENCOUNTER — LAB VISIT (OUTPATIENT)
Dept: LAB | Facility: HOSPITAL | Age: 82
End: 2022-12-27
Attending: INTERNAL MEDICINE
Payer: MEDICARE

## 2022-12-27 DIAGNOSIS — I50.42 CHRONIC COMBINED SYSTOLIC AND DIASTOLIC CONGESTIVE HEART FAILURE: ICD-10-CM

## 2022-12-27 LAB
ANION GAP SERPL CALC-SCNC: 9 MMOL/L (ref 8–16)
BNP SERPL-MCNC: 1911 PG/ML (ref 0–99)
BUN SERPL-MCNC: 39 MG/DL (ref 8–23)
CALCIUM SERPL-MCNC: 9.4 MG/DL (ref 8.7–10.5)
CHLORIDE SERPL-SCNC: 103 MMOL/L (ref 95–110)
CO2 SERPL-SCNC: 29 MMOL/L (ref 23–29)
CREAT SERPL-MCNC: 1.7 MG/DL (ref 0.5–1.4)
EST. GFR  (NO RACE VARIABLE): 40.2 ML/MIN/1.73 M^2
GLUCOSE SERPL-MCNC: 93 MG/DL (ref 70–110)
POTASSIUM SERPL-SCNC: 4.2 MMOL/L (ref 3.5–5.1)
SODIUM SERPL-SCNC: 141 MMOL/L (ref 136–145)

## 2022-12-27 PROCEDURE — 80048 BASIC METABOLIC PNL TOTAL CA: CPT | Performed by: INTERNAL MEDICINE

## 2022-12-27 PROCEDURE — 36415 COLL VENOUS BLD VENIPUNCTURE: CPT | Performed by: INTERNAL MEDICINE

## 2022-12-27 PROCEDURE — 83880 ASSAY OF NATRIURETIC PEPTIDE: CPT | Performed by: INTERNAL MEDICINE

## 2022-12-28 ENCOUNTER — TELEPHONE (OUTPATIENT)
Dept: CARDIOLOGY | Facility: CLINIC | Age: 82
End: 2022-12-28
Payer: MEDICARE

## 2022-12-28 NOTE — TELEPHONE ENCOUNTER
Pt notified appt made for nurse visit. He was instructed to bring all his medications so that I can make sure all are utd on mar I also need the start dates pb    ----- Message from Amos Grider MD sent at 12/28/2022  3:20 PM CST -----  Kayla,  See comments below and call patient to discuss.   Please close encounter when done -- no need to route back to me.  Thanks  His BNP is coming down at no significant deleterious effect on his BMP  Before we continue with med adjustments, Kayla will you please either bring him in or get him/his family on the phone to review his meds and update his med card - including the dates when he started any of the CHF meds (he DCs a lot on his own).  I prefer if you'd do that in person with his pills with him in the clinic.  Tx

## 2022-12-28 NOTE — PROGRESS NOTES
Kayla,  See comments below and call patient to discuss.   Please close encounter when done -- no need to route back to me.  Thanks  His BNP is coming down at no significant deleterious effect on his BMP  Before we continue with med adjustments, Kayla will you please either bring him in or get him/his family on the phone to review his meds and update his med card - including the dates when he started any of the CHF meds (he DCs a lot on his own).  I prefer if you'd do that in person with his pills with him in the clinic.  Tx

## 2022-12-30 ENCOUNTER — TELEPHONE (OUTPATIENT)
Dept: CARDIOLOGY | Facility: CLINIC | Age: 82
End: 2022-12-30
Payer: MEDICARE

## 2022-12-30 NOTE — TELEPHONE ENCOUNTER
Patient came in for nurse visit med were updated. Patient was advise that we will ask doctor for recommendations. Patient stated understanding

## 2022-12-31 DIAGNOSIS — I50.9 CONGESTIVE HEART FAILURE, UNSPECIFIED HF CHRONICITY, UNSPECIFIED HEART FAILURE TYPE: Primary | ICD-10-CM

## 2022-12-31 RX ORDER — SPIRONOLACTONE 25 MG/1
25 TABLET ORAL DAILY
Qty: 30 TABLET | Refills: 11 | Status: SHIPPED | OUTPATIENT
Start: 2022-12-31 | End: 2023-01-03

## 2023-01-03 ENCOUNTER — TELEPHONE (OUTPATIENT)
Dept: CARDIOLOGY | Facility: CLINIC | Age: 83
End: 2023-01-03
Payer: MEDICARE

## 2023-01-03 RX ORDER — DIPHENHYDRAMINE HCL 25 MG
25 CAPSULE ORAL EVERY 6 HOURS PRN
COMMUNITY
End: 2023-03-09 | Stop reason: CLARIF

## 2023-01-03 RX ORDER — SPIRONOLACTONE 25 MG/1
25 TABLET ORAL DAILY
COMMUNITY
End: 2023-02-27 | Stop reason: SDUPTHER

## 2023-01-03 RX ORDER — FLAXSEED
POWDER (GRAM) ORAL DAILY
COMMUNITY
End: 2023-03-09

## 2023-01-03 RX ORDER — EPINEPHRINE 0.22MG
100 AEROSOL WITH ADAPTER (ML) INHALATION DAILY
COMMUNITY

## 2023-01-03 NOTE — TELEPHONE ENCOUNTER
This pt had a nurse visit on 12-30-22 for a nurse visit with scripts and supplements in hand. This was done by ariadna due to I was at a different location. I will check with her to see nestor nuñez. Thanks pb

## 2023-01-09 ENCOUNTER — OFFICE VISIT (OUTPATIENT)
Dept: CARDIOLOGY | Facility: CLINIC | Age: 83
End: 2023-01-09
Payer: MEDICARE

## 2023-01-09 VITALS
HEART RATE: 72 BPM | SYSTOLIC BLOOD PRESSURE: 124 MMHG | HEIGHT: 69 IN | DIASTOLIC BLOOD PRESSURE: 80 MMHG | BODY MASS INDEX: 23.38 KG/M2 | WEIGHT: 157.88 LBS | OXYGEN SATURATION: 99 %

## 2023-01-09 DIAGNOSIS — G47.33 OSA (OBSTRUCTIVE SLEEP APNEA): ICD-10-CM

## 2023-01-09 DIAGNOSIS — Z95.2 HISTORY OF AORTIC VALVE REPLACEMENT: ICD-10-CM

## 2023-01-09 DIAGNOSIS — I10 WHITE COAT SYNDROME WITH DIAGNOSIS OF HYPERTENSION: ICD-10-CM

## 2023-01-09 DIAGNOSIS — Z88.9 H/O MULTIPLE ALLERGIES: ICD-10-CM

## 2023-01-09 DIAGNOSIS — R21 RASH: ICD-10-CM

## 2023-01-09 DIAGNOSIS — R06.02 SOB (SHORTNESS OF BREATH): ICD-10-CM

## 2023-01-09 DIAGNOSIS — Z95.2 S/P AVR (AORTIC VALVE REPLACEMENT): ICD-10-CM

## 2023-01-09 DIAGNOSIS — I10 HYPERTENSION, UNSPECIFIED TYPE: ICD-10-CM

## 2023-01-09 DIAGNOSIS — I45.2 RBBB PLUS LA HEMIBLOCK: ICD-10-CM

## 2023-01-09 DIAGNOSIS — Z91.199 NONADHERENCE TO MEDICAL TREATMENT: ICD-10-CM

## 2023-01-09 DIAGNOSIS — I48.0 PAF (PAROXYSMAL ATRIAL FIBRILLATION): ICD-10-CM

## 2023-01-09 DIAGNOSIS — Z95.810 ICD (IMPLANTABLE CARDIOVERTER-DEFIBRILLATOR) IN PLACE: ICD-10-CM

## 2023-01-09 DIAGNOSIS — Z79.899 HIGH RISK MEDICATIONS (NOT ANTICOAGULANTS) LONG-TERM USE: ICD-10-CM

## 2023-01-09 DIAGNOSIS — I50.42 CHRONIC COMBINED SYSTOLIC AND DIASTOLIC CONGESTIVE HEART FAILURE: Primary | ICD-10-CM

## 2023-01-09 PROCEDURE — 99999 PR PBB SHADOW E&M-EST. PATIENT-LVL IV: CPT | Mod: PBBFAC,,, | Performed by: STUDENT IN AN ORGANIZED HEALTH CARE EDUCATION/TRAINING PROGRAM

## 2023-01-09 PROCEDURE — 99999 PR PBB SHADOW E&M-EST. PATIENT-LVL IV: ICD-10-PCS | Mod: PBBFAC,,, | Performed by: STUDENT IN AN ORGANIZED HEALTH CARE EDUCATION/TRAINING PROGRAM

## 2023-01-09 PROCEDURE — 99214 OFFICE O/P EST MOD 30 MIN: CPT | Mod: PBBFAC | Performed by: STUDENT IN AN ORGANIZED HEALTH CARE EDUCATION/TRAINING PROGRAM

## 2023-01-09 PROCEDURE — 99214 OFFICE O/P EST MOD 30 MIN: CPT | Mod: S$PBB,,, | Performed by: STUDENT IN AN ORGANIZED HEALTH CARE EDUCATION/TRAINING PROGRAM

## 2023-01-09 PROCEDURE — 99214 PR OFFICE/OUTPT VISIT, EST, LEVL IV, 30-39 MIN: ICD-10-PCS | Mod: S$PBB,,, | Performed by: STUDENT IN AN ORGANIZED HEALTH CARE EDUCATION/TRAINING PROGRAM

## 2023-01-09 NOTE — PROGRESS NOTES
Section of Cardiology                  Cardiac Clinic Note    Chief Complaint/Reason for consultation:  Establish care      HPI:   Edmund Chu is a 82 y.o. male with h/o hypertension, CHF, AVR 2016 ([facility]), h/o colon cancerwho comes in to Cardiology Clinic to establish care.        7/27/2022  Per Care everywhere, last echocardiogram March 2021 with redo use LV function, right heart failure, moderate mitral regurgitation, normal functioning bioprosthetic aortic valve    Comes in with wife   Only coming in for ICD evaluation   Still seeing Dr. Viveros  Has had CHF since 2009- did not want ICD until now    Aortic valve replacement, no aspirin due to mild bruising     Reports heart issues since 2009- had colon cancer s/p resection     Walks regularly, 1.2 miles daily, worsening SOB  Recent orthopnea last few days  Reports leg swelling- recently needed to take torsemide which he doesn't take it regularly   He's a vegetarian, does not use salt  Trying to watch fluid intake     Family hx: cousin: ICD, afib   Stopped smoking at age of 32 yo. ETOH occ    8/31/22  BNP level was elevated, torsemide 100 mg daily increased to 200 mg daily at the time  Repeat BNP level was elevated, CMP showed stable renal function  Patient now back down to 100 mg   Dropped 6lbs since last visit  Reports daily weights, fluid and salt restriction  Has not taken Xarelto in fear of bleeding if he cuts himself  Blood pressure readings normotensive at home ranges a 110 to 135 systolic    Denies chest pain, shortness of breath, dizziness, palpitations, orthopnea, PND      10/7/22  Saw Dr. Wong, will be having ICD placement- but patient wants to wait  Reports rash/itching, unsure of which medication is causing it  Has seen dermatology  Switched torsemide to lasix due to itching, however, itching has not improved  BP elevated  Echo pending     Denies chest pain, shortness of breath, dizziness, palpitations, orthopnea,  PND      11/8/22  ICD implanted on 10/27/22  Reports cough and SOB, sputum production  Denies LE swelling, PND  Recently started back on diuretics, torsemide 20  No longer taking farxiga, spironolactone   Believes itching was due to wine, has since stopped   Did not see allergy/imm      11/22/22  Virtual visit   Concerned about his labs  Chest x-ray showing cardiac enlargement, vascular congestion, no pneumonia or pneumothorax  Labs show heart failure in congestion, BNP significantly elevated   Continues to have cough with sputum production  Reports shortness of breath with ambulation  Patient to take 2 tablets of torsemide   Will repeat labs in 1 week      12/12/22  Comes in with wife and daughter   Has h/o insomnia, but believes it's worsened after the ICD  Decided to switch from torsemide back to Lasix:  Thought torsemide was causing knee pain.  But switched back to torsemide 1.5 tablets daily  Recent BNP elevated 3,945   Repeat BNP pending today  Does not wear CPAP machine regularly  Still believes he has allergies to some medications        1/9/23  Still having issues sleeping  Taking medications for sleep  Feels better when he gets good sleep   Echo with EF 15-20%, mod-severe TR/MR (worsening from prior)  Taking 1.5 torsemide daily   SOB has improved  Restarted farxiga       EKG 8/31/2022 NSR, RBBB, LAFB, PVCs,  ms   EKG 7/27/2022 NSR, RBBB, LAFB, inferior infarct, qtc 529 ms          Prior records  Stress test 12/2020 small reversible defect, apical segment.  Medium perfusion defect inferior wall  [facility] 2/21:  Left main 20% stenosis   Lad:  Mid LAD 50% stenosis   Left circumflex: Diffuse irregularity  Mid RCA proximal portion 50% stenosis  Distal RCA 50% stenosis   Ramus diffuse irregularity  Echo 11/21 EF 30%, RV function is severely decreased          Echo 12/19/22  The left ventricle is mildly enlarged with severely decreased systolic function.  The estimated ejection fraction is 15 - 20%.  Grade II  left ventricular diastolic dysfunction.  Moderate right ventricular enlargement with severely reduced right ventricular systolic function.  Severe left atrial enlargement.  Severe right atrial enlargement.  There is a 23 mm Freire II porcine bioprosthetic aortic valve present. There is trivial central aortic insufficiency present.  The aortic valve mean gradient is 6 mmHg with a dimensionless index of 0.27.  Moderate-to-severe mitral regurgitation.  Moderate to severe tricuspid regurgitation.  Moderate pulmonic regurgitation.  The estimated PA systolic pressure is greater than 54 mmHg.  There is pulmonary hypertension.    Echo 10/22  Concentric hypertrophy and severely decreased systolic function.  Severe left atrial enlargement.  The estimated ejection fraction is 20%.  Left ventricular diastolic dysfunction.  Normal right ventricular size with normal right ventricular systolic function.  Mild tricuspid regurgitation.      ECHO  3/21 (Care everywhere)  CONCLUSIONS:   - Exam indication: Initial evaluation of known cardiomyopathy   - The left ventricle is mildly dilated. There is left ventricular hypertrophy.   Left ventricular systolic function is severely decreased. EF = 26 ± 5% (2D 4-ch.)   Definity contrast used for endocardial border detection.   - The right ventricle is dilated. Right ventricular systolic function is   moderately decreased.   - The left atrial cavity is severely dilated.   - The right atrial cavity is dilated.   - There is moderate (2+ - 3+) mitral valve regurgitation due to restricted leaflet    motion and apical tethering of normal mitral leaflet caused by LV enlargement.   Regurgitant orifice area (PISA) is 0.24 cm².   - Porcine prosthetic aortic valve. There is trace aortic valve regurgitation. The   peak gradient is 10 mmHg, the mean gradient is 5 mmHg and the dimensionless valve   index is 0.31.   - Estimated right ventricular systolic pressure is 42 mmHg plus right atrial   pressure.  Estimated right atrial pressure is not included as the IVC was not seen.   - The patient has not had a prior CC echocardiographic exam for comparison.       STRESS TEST    LHC      ROS: All 10 systems reviewed. Please refer to the HPI for pertinent positives. All other systems negative.     Past Medical History  Past Medical History:   Diagnosis Date    CHF (congestive heart failure)     Hypertension        Surgical History  Past Surgical History:   Procedure Laterality Date    AORTIC VALVE REPLACEMENT  2016    colon section removal            Allergies:   Review of patient's allergies indicates:   Allergen Reactions    Sacubitril-valsartan Hives and Swelling    Amlodipine Edema     Ankle    Lactose Other (See Comments)     GI distress    Lisinopril Other (See Comments)    Mefloquine Hives and Itching     Anti malarial         Social History:  Social History     Socioeconomic History    Marital status:    Tobacco Use    Smoking status: Former    Smokeless tobacco: Never   Substance and Sexual Activity    Alcohol use: Yes    Drug use: Never       Family History:  family history includes Colon cancer in his father; Hypertension in his mother.    Home Medications:  Current Outpatient Medications on File Prior to Visit   Medication Sig Dispense Refill    carboxymethylcellulose (REFRESH PLUS) 0.5 % Dpet 1 drop 3 (three) times daily as needed.      carvediloL (COREG) 12.5 MG tablet Take 2 tablets (25 mg total) by mouth 2 (two) times daily with meals. 60 tablet 11    coenzyme Q10 100 mg capsule Take 100 mg by mouth once daily.      dietary supplement,misc comb14 24-21 mg Cap Take by mouth.      diphenhydrAMINE (BENADRYL) 25 mg capsule Take 25 mg by mouth every 6 (six) hours as needed for Itching (q 6 hours prn).      FARXIGA 10 mg tablet       flaxseed Powd Take by mouth once daily.      LORazepam (ATIVAN) 0.5 MG tablet Take 0.5 mg by mouth 2 (two) times daily.      olmesartan (BENICAR) 40 MG tablet Take 20 mg by  "mouth once daily.      torsemide (DEMADEX) 20 MG Tab Take 20 mg by mouth once daily.      tumeric-ging-olive-oreg-capryl 100 mg-150 mg- 50 mg-150 mg Cap Take by mouth once daily.      zolpidem (AMBIEN) 10 mg Tab Take 5 mg by mouth nightly as needed.      zolpidem (AMBIEN) 5 MG Tab       spironolactone (ALDACTONE) 25 MG tablet Take 25 mg by mouth once daily.       No current facility-administered medications on file prior to visit.       Physical exam:  /80 (BP Location: Left arm, Patient Position: Sitting, BP Method: Medium (Manual))   Pulse 72   Ht 5' 9" (1.753 m)   Wt 71.6 kg (157 lb 13.6 oz)   SpO2 99%   BMI 23.31 kg/m²         General: Pt is a 82 y.o. year old male who is AAOx3, in NAD, is pleasant, well nourished, looks stated age  HEENT: PERRL, EOMI, Oral mucosa pink & moist  CVS  No abnormal cardiac pulsations noted on inspection. +JVP The apical impulse is normal on palpation, and is located in the left 5th intercostal space in the mid - clavicular line. No palpable thrills or abnormal pulsations noted. RR, S1 - S2 heard, no murmurs, rubs or gallops appreciated.   PUL :  Decreased breath sounds on the right  ABD : BS +, soft. No tenderness elicited   LE : No C/C/E. Distal Pulses palpable B/L           LABS:    Chemistry:   Lab Results   Component Value Date     12/27/2022    K 4.2 12/27/2022     12/27/2022    CO2 29 12/27/2022    BUN 39 (H) 12/27/2022    CREATININE 1.7 (H) 12/27/2022    CALCIUM 9.4 12/27/2022     Cardiac Markers: No results found for: CKTOTAL, CKMB, CKMBINDEX, TROPONINI  Cardiac Markers (Last 3): No results found for: CKTOTAL, CKMB, CKMBINDEX, TROPONINI  CBC:   Lab Results   Component Value Date    WBC 5.68 10/06/2022    HGB 14.0 10/06/2022    HCT 43.6 10/06/2022    MCV 98 10/06/2022     (L) 10/06/2022     Lipids: No results found for: CHOL, TRIG, HDL, LDLDIRECT  Coagulation:   Lab Results   Component Value Date    INR 1.2 10/06/2022    APTT 27.8 10/06/2022 "           Assessment      1. Chronic combined systolic and diastolic congestive heart failure    2. Hypertension, unspecified type    3. S/P AVR (aortic valve replacement)    4. SOB (shortness of breath)    5. H/O multiple allergies    6. RBBB plus LA hemiblock    7. ICD (implantable cardioverter-defibrillator) in place    8. PAF (paroxysmal atrial fibrillation)    9. Nonadherence to medical treatment    10. DIVYA (obstructive sleep apnea)    11. History of aortic valve replacement    12. High risk medications (not anticoagulants) long-term use    13. White coat syndrome with diagnosis of hypertension    14. Rash            Plan:    Shortness of breath/cough - stable   Continue torsemide 1.5 mg daily (increase to 2 tablets if symptoms worsen)    Congestive heart failure s/p ICD  NYHA class III, stage C  BNP improving   Systolic heart failure with right ventricular failure  Continue carvedilol, Benicar, Farxiga  Hold spironolactone for now, K at upper limit of normal   Stopped entresto - due to itching - allergy/immunology appt pending   Taking Torsemide 30 mg     ICD implanted  F/u with device clinic   Follow up with Dr. Grider     Aortic valve replacement  Normally function per last echo in 2021  Echo with normal EF, valve looks to be functioning properly     PAF  Would consider restarting Xarelto  Continue coreg     Hypertension  Continue above regimen   (says Entresto had side effects-itching around eyes)- but patient thinking about restarting       This note was prepared using voice recognition system and is likely to have sound alike errors that may have been overlooked even after proofreading.     I have reviewed all pertinent chart information.  Plans and recommendations have been formulated under my direct supervision. All questions answered and patient voiced understanding.   If symptoms persist go to the ED.    RTC in 3 months        Tere Johns MD  Cardiology

## 2023-01-10 ENCOUNTER — OFFICE VISIT (OUTPATIENT)
Dept: PULMONOLOGY | Facility: CLINIC | Age: 83
End: 2023-01-10
Payer: MEDICARE

## 2023-01-10 VITALS
HEART RATE: 68 BPM | OXYGEN SATURATION: 99 % | RESPIRATION RATE: 18 BRPM | SYSTOLIC BLOOD PRESSURE: 124 MMHG | DIASTOLIC BLOOD PRESSURE: 70 MMHG | BODY MASS INDEX: 23.45 KG/M2 | HEIGHT: 69 IN | WEIGHT: 158.31 LBS

## 2023-01-10 DIAGNOSIS — I27.20 PULMONARY HYPERTENSION: ICD-10-CM

## 2023-01-10 DIAGNOSIS — I50.42 CHRONIC COMBINED SYSTOLIC AND DIASTOLIC CONGESTIVE HEART FAILURE: ICD-10-CM

## 2023-01-10 DIAGNOSIS — Z95.2 S/P AVR (AORTIC VALVE REPLACEMENT): ICD-10-CM

## 2023-01-10 DIAGNOSIS — G47.33 OSA (OBSTRUCTIVE SLEEP APNEA): Primary | ICD-10-CM

## 2023-01-10 DIAGNOSIS — R06.02 SOB (SHORTNESS OF BREATH): ICD-10-CM

## 2023-01-10 DIAGNOSIS — Z95.810 ICD (IMPLANTABLE CARDIOVERTER-DEFIBRILLATOR) IN PLACE: ICD-10-CM

## 2023-01-10 DIAGNOSIS — G47.09 OTHER INSOMNIA: ICD-10-CM

## 2023-01-10 DIAGNOSIS — I10 PRIMARY HYPERTENSION: ICD-10-CM

## 2023-01-10 PROCEDURE — 99999 PR PBB SHADOW E&M-EST. PATIENT-LVL V: CPT | Mod: PBBFAC,,, | Performed by: PHYSICIAN ASSISTANT

## 2023-01-10 PROCEDURE — 90694 VACC AIIV4 NO PRSRV 0.5ML IM: CPT | Mod: PBBFAC

## 2023-01-10 PROCEDURE — 99215 OFFICE O/P EST HI 40 MIN: CPT | Mod: PBBFAC,25 | Performed by: PHYSICIAN ASSISTANT

## 2023-01-10 PROCEDURE — 99204 PR OFFICE/OUTPT VISIT, NEW, LEVL IV, 45-59 MIN: ICD-10-PCS | Mod: S$PBB,,, | Performed by: PHYSICIAN ASSISTANT

## 2023-01-10 PROCEDURE — 99204 OFFICE O/P NEW MOD 45 MIN: CPT | Mod: S$PBB,,, | Performed by: PHYSICIAN ASSISTANT

## 2023-01-10 PROCEDURE — 99999 PR PBB SHADOW E&M-EST. PATIENT-LVL V: ICD-10-PCS | Mod: PBBFAC,,, | Performed by: PHYSICIAN ASSISTANT

## 2023-01-10 RX ORDER — ALBUTEROL SULFATE 90 UG/1
2 AEROSOL, METERED RESPIRATORY (INHALATION) EVERY 6 HOURS PRN
Qty: 18 G | Refills: 11 | Status: SHIPPED | OUTPATIENT
Start: 2023-01-10 | End: 2023-03-09 | Stop reason: CLARIF

## 2023-01-10 RX ORDER — FUROSEMIDE 20 MG/1
20 TABLET ORAL
COMMUNITY
Start: 2023-01-02 | End: 2023-02-27

## 2023-01-10 NOTE — PROGRESS NOTES
Subjective:       Patient ID: Edmund Chu is a 82 y.o. male.    Chief Complaint: Apnea      81yo male referred by Tere Johns MD for DIVYA  History of DIVYA diagnosed 2010 at [facility]; unsure of severity, he does not wear CPAP  Here today complaining of insomnia  Sleep onset insomnia for many years  Takes 10mg Ambien, sometimes cuts in half, he says this does not always work; takes with 1mg Ativan  Also tried medical marijuana, did not help; tried benadryl, made him too sleepy; tried melatonin, caused vivid dreams  In bed 12am, listens to audiobook - sets timer to go off in an hour; takes hours to fall asleep; if able to fall asleep he says he does not have a problem waking up at night  Snores, wakes up unrefreshed  Wife tells him he has restless legs - cannt sleep in same room    h/o hypertension, CHF, AVR 2016 ([facility]), h/o colon cancer  ICD implanted on 10/27/22  Echo 12/2022 estimated ejection fraction is 15 - 20%; estimated PA pressure 54mmHg  Taking 1.5 torsemide daily - SOB improved with this  No cough or wheezing    PFT report from 2017 reviewed from [facility], decreased FEV1 with bronchodilator improvement   He does not use any inhalers  Smoked from age [age]  Retired professor at [employer], studied Organic production of [employer]  Ran [employer] as a young adult           BP Readings from Last 3 Encounters:   01/10/23 124/70   01/09/23 124/80   12/19/22 (!) 138/90     Snoring / Sleep:     [place] Questionnaire (validated DIVYA screening questionnaire)    yes -- Snoring/apnea    yes -- Fatigue    Body mass index is 23.38 kg/m².  (>25 is overweight, >30 is obese)    Blood Pressure = Hypertension  (PreHTN 120-139/80-89, Stg1 140-159/90-99, Stg2 >160/>100)  [place] = 3 of three DIVYA categories are positive (high risk is 2-3 positive categories)     [place] Sleepiness Scale TOTAL =   5  (validated sleepiness questionnaire with a higher score indicating greater sleepiness; range 0-24)  EPWORTH  SLEEPINESS SCALE 1/10/2023   Sitting and reading 1   Watching TV 1   Sitting, inactive in a public place (e.g. a theatre or a meeting) 1   As a passenger in a car for an hour without a break 0   Lying down to rest in the afternoon when circumstances permit 1   Sitting and talking to someone 0   Sitting quietly after a lunch without alcohol 1   In a car, while stopped for a few minutes in traffic 0   Total score 5         STOP-Bang Questionnaire (validated DIVYA screening questionnaire)  Negative unless checked off.  [x] Snoring    [x]  Tired/Fatigued/Sleepy  [] Obstruction (apneas/choking)  [x] Pressure (HTN)  [] BMI >35  [x] Age >50  [x] Neck >40 cm  [x] Gender male   STOP-Bang = 6 (low risk 0-2,high risk 3-8)    Immunization History   Administered Date(s) Administered    Influenza (FLUAD) - Quadrivalent - Adjuvanted - PF *Preferred* (65+) 01/10/2023      Tobacco Use: Medium Risk    Smoking Tobacco Use: Former    Smokeless Tobacco Use: Never    Passive Exposure: Not on file      Past Medical History:   Diagnosis Date    CHF (congestive heart failure)     Hypertension       Current Outpatient Medications on File Prior to Visit   Medication Sig Dispense Refill    carboxymethylcellulose (REFRESH PLUS) 0.5 % Dpet 1 drop 3 (three) times daily as needed.      carvediloL (COREG) 12.5 MG tablet Take 2 tablets (25 mg total) by mouth 2 (two) times daily with meals. 60 tablet 11    coenzyme Q10 100 mg capsule Take 100 mg by mouth once daily.      dietary supplement,misc comb14 24-21 mg Cap Take by mouth.      diphenhydrAMINE (BENADRYL) 25 mg capsule Take 25 mg by mouth every 6 (six) hours as needed for Itching (q 6 hours prn).      FARXIGA 10 mg tablet       flaxseed Powd Take by mouth once daily.      furosemide (LASIX) 20 MG tablet Take 20 mg by mouth.      LORazepam (ATIVAN) 0.5 MG tablet Take 0.5 mg by mouth 2 (two) times daily.      olmesartan (BENICAR) 40 MG tablet Take 20 mg by mouth once daily.      spironolactone  "(ALDACTONE) 25 MG tablet Take 25 mg by mouth once daily.      torsemide (DEMADEX) 20 MG Tab Take 20 mg by mouth once daily.      tumeric-ging-olive-oreg-capryl 100 mg-150 mg- 50 mg-150 mg Cap Take by mouth once daily.      zolpidem (AMBIEN) 10 mg Tab Take 5 mg by mouth nightly as needed.      zolpidem (AMBIEN) 5 MG Tab        No current facility-administered medications on file prior to visit.        Review of Systems   Constitutional:  Positive for fatigue. Negative for fever, weight loss, appetite change and weakness.   HENT:  Negative for postnasal drip, rhinorrhea, sinus pressure, trouble swallowing and congestion.    Respiratory:  Positive for shortness of breath, dyspnea on extertion and somnolence. Negative for cough, sputum production, choking, chest tightness and wheezing.    Cardiovascular:  Negative for chest pain and leg swelling.   Musculoskeletal:  Negative for arthralgias, gait problem and joint swelling.   Gastrointestinal:  Negative for nausea, vomiting and abdominal pain.   Neurological:  Negative for dizziness, weakness and headaches.   Psychiatric/Behavioral:  Positive for sleep disturbance.    All other systems reviewed and are negative.    Objective:       Vitals:    01/10/23 1319   BP: 124/70   Pulse: 68   Resp: 18   SpO2: 99%   Weight: 71.8 kg (158 lb 5.4 oz)   Height: 5' 9" (1.753 m)       Physical Exam   Constitutional: He is oriented to person, place, and time. He appears well-developed and well-nourished. No distress.   HENT:   Head: Normocephalic.   Nose: Nose normal.   Mouth/Throat: Oropharynx is clear and moist.   Cardiovascular: Normal rate and regular rhythm.   Pulmonary/Chest: Effort normal. No respiratory distress. He has no wheezes. He has no rhonchi. He has no rales.   Musculoskeletal:         General: No edema.      Cervical back: Normal range of motion and neck supple.   Lymphadenopathy: No supraclavicular adenopathy is present.     He has no cervical adenopathy. "   Neurological: He is alert and oriented to person, place, and time. Gait normal.   Skin: Skin is warm and dry.   Psychiatric: He has a normal mood and affect.   Vitals reviewed.  Personal Diagnostic Review    Cardiac device check - Remote  Battery and Leads (BL)  Intrinsic P-wave chronically measured < 1 mV  Normal battery parameters    Presenting Rhythm (OH)  Atrial Sensing-Ventricular Pacing (AS-)  Premature Ventricular Contraction(s) on presenting rhythm  Atrial Sensing-Ventricular Sensing (AS-VS)    Arrhythmic events (AE)  No new arrhythmic events in monitoring period    Cardiovascular Physiologic Parameters (CPP)  No abnormalities in physiologic parameters identified    Transmission Information (TI)  Device Summary Report    Follow Up (FU)  Continue remote monitoring with quarterly reporting    Additional Comments  ICD Report  Monitoring period: 10/31/22-12/1/22    Battery/Lead Status: AURA/Intrinsic P-wave chronically measured < 1 mV    : 0%          No flowsheet data found.      Assessment/Plan:       Problem List Items Addressed This Visit          Pulmonary    SOB (shortness of breath)     CHF  Possible reactive airways per report from [facility]  Will get PFT and billy         Relevant Medications    albuterol (VENTOLIN HFA) 90 mcg/actuation inhaler    Other Relevant Orders    Complete PFT with bronchodilator    Stress test, pulmonary    Pulmonary hypertension     CHF  Will get PSG, walk, PFT            Cardiac/Vascular    Hypertension     Controlled, F/u regularly with cardiology           Chronic combined systolic and diastolic congestive heart failure     F/u regularly with cardiology      Echo    Interpretation Summary  · The left ventricle is mildly enlarged with severely decreased systolic function.  · The estimated ejection fraction is 15 - 20%.  · Grade II left ventricular diastolic dysfunction.  · Moderate right ventricular enlargement with severely reduced right ventricular systolic  function.  · Severe left atrial enlargement.  · Severe right atrial enlargement.  · There is a 23 mm Freire II porcine bioprosthetic aortic valve present. There is trivial central aortic insufficiency present.  · The aortic valve mean gradient is 6 mmHg with a dimensionless index of 0.27.  · Moderate-to-severe mitral regurgitation.  · Moderate to severe tricuspid regurgitation.  · Moderate pulmonic regurgitation.  · The estimated PA systolic pressure is greater than 54 mmHg.  · There is pulmonary hypertension.             S/P AVR (aortic valve replacement)    ICD (implantable cardioverter-defibrillator) in place       Other    DIVYA (obstructive sleep apnea) - Primary     STOPbang 6, [place] 3, Ep 6  Psg  DIVYA and implications on health discussed  Follow up in 4 weeks after sleep study           Relevant Orders    Polysomnogram (CPAP will be added if patient meets diagnostic criteria.)    Other insomnia     Sleep hygiene discussed  Will need PSG and correct sleep apnea if indicated  Possible PLMD          Follow up for flu shot today; PFT, walk, and f/u Dr. Noyola next available.    Discussed diagnosis, its evaluation, treatment and usual course. All questions answered.    Patient verbalized understanding of plan and left in no acute distress    Thank you for the courtesy of participating in the care of this patient    [facility]DA [facility]

## 2023-01-11 PROBLEM — I27.20 PULMONARY HYPERTENSION: Status: ACTIVE | Noted: 2023-01-11

## 2023-01-11 PROBLEM — G47.09 OTHER INSOMNIA: Status: ACTIVE | Noted: 2023-01-11

## 2023-01-11 PROBLEM — R06.02 SOB (SHORTNESS OF BREATH): Status: ACTIVE | Noted: 2023-01-11

## 2023-01-11 PROBLEM — G47.33 OSA (OBSTRUCTIVE SLEEP APNEA): Status: ACTIVE | Noted: 2023-01-11

## 2023-01-11 NOTE — ASSESSMENT & PLAN NOTE
Aryg 6, [place] 3, Ep 6  Psg  DIVYA and implications on health discussed  Follow up in 4 weeks after sleep study

## 2023-01-11 NOTE — ASSESSMENT & PLAN NOTE
CHF  Possible reactive airways per report from [facility]  Will get PFT and billy

## 2023-01-11 NOTE — ASSESSMENT & PLAN NOTE
F/u regularly with cardiology      Echo    Interpretation Summary  · The left ventricle is mildly enlarged with severely decreased systolic function.  · The estimated ejection fraction is 15 - 20%.  · Grade II left ventricular diastolic dysfunction.  · Moderate right ventricular enlargement with severely reduced right ventricular systolic function.  · Severe left atrial enlargement.  · Severe right atrial enlargement.  · There is a 23 mm Freire II porcine bioprosthetic aortic valve present. There is trivial central aortic insufficiency present.  · The aortic valve mean gradient is 6 mmHg with a dimensionless index of 0.27.  · Moderate-to-severe mitral regurgitation.  · Moderate to severe tricuspid regurgitation.  · Moderate pulmonic regurgitation.  · The estimated PA systolic pressure is greater than 54 mmHg.  · There is pulmonary hypertension.

## 2023-01-12 ENCOUNTER — OFFICE VISIT (OUTPATIENT)
Dept: ALLERGY | Facility: CLINIC | Age: 83
End: 2023-01-12
Payer: MEDICARE

## 2023-01-12 VITALS
SYSTOLIC BLOOD PRESSURE: 129 MMHG | HEART RATE: 74 BPM | DIASTOLIC BLOOD PRESSURE: 83 MMHG | TEMPERATURE: 98 F | HEIGHT: 69 IN | BODY MASS INDEX: 22.83 KG/M2 | WEIGHT: 154.13 LBS

## 2023-01-12 DIAGNOSIS — L29.9 ITCHING: Primary | ICD-10-CM

## 2023-01-12 DIAGNOSIS — Z88.9 DRUG ALLERGY: ICD-10-CM

## 2023-01-12 DIAGNOSIS — R21 RASH: ICD-10-CM

## 2023-01-12 PROCEDURE — 99999 PR PBB SHADOW E&M-EST. PATIENT-LVL IV: CPT | Mod: PBBFAC,,, | Performed by: ALLERGY & IMMUNOLOGY

## 2023-01-12 PROCEDURE — 99204 OFFICE O/P NEW MOD 45 MIN: CPT | Mod: S$PBB,,, | Performed by: ALLERGY & IMMUNOLOGY

## 2023-01-12 PROCEDURE — 99999 PR PBB SHADOW E&M-EST. PATIENT-LVL IV: ICD-10-PCS | Mod: PBBFAC,,, | Performed by: ALLERGY & IMMUNOLOGY

## 2023-01-12 PROCEDURE — 99204 PR OFFICE/OUTPT VISIT, NEW, LEVL IV, 45-59 MIN: ICD-10-PCS | Mod: S$PBB,,, | Performed by: ALLERGY & IMMUNOLOGY

## 2023-01-12 PROCEDURE — 99214 OFFICE O/P EST MOD 30 MIN: CPT | Mod: PBBFAC | Performed by: ALLERGY & IMMUNOLOGY

## 2023-01-12 NOTE — PROGRESS NOTES
Subjective:       Patient ID: Edmund Chu is a 82 y.o. male.    Chief Complaint:  Rash      HPI: 82 year old male referred by Cardiology due to a rash with Entresto.  He reports chronic itch.He feels it becomes worse after certain medications.  Seen by and outside Dermatologist. He was told it was a medication that he stopped briefly, but he restarted and feels itching has subsided.  NO itching today.  Infrared lighting - he thinks itching may have started after this exposure  He reports that itching has significantly subsided over the last 2-3 weeks  Itching was ongoing for several months.  He does drink wine intermittently.      Itching around eyes with Entresto.  He denies a rash with Entresto.    He denies a rash.      Ph.D - horticulture     Past Medical History:   Diagnosis Date    CHF (congestive heart failure)     Hypertension         Family History   Problem Relation Age of Onset    Hypertension Mother     Colon cancer Father         Current Outpatient Medications on File Prior to Visit   Medication Sig Dispense Refill    albuterol (VENTOLIN HFA) 90 mcg/actuation inhaler Inhale 2 puffs into the lungs every 6 (six) hours as needed for Wheezing. Rescue 18 g 11    carboxymethylcellulose (REFRESH PLUS) 0.5 % Dpet 1 drop 3 (three) times daily as needed.      carvediloL (COREG) 12.5 MG tablet Take 2 tablets (25 mg total) by mouth 2 (two) times daily with meals. 60 tablet 11    coenzyme Q10 100 mg capsule Take 100 mg by mouth once daily.      dietary supplement,misc comb14 24-21 mg Cap Take by mouth.      diphenhydrAMINE (BENADRYL) 25 mg capsule Take 25 mg by mouth every 6 (six) hours as needed for Itching (q 6 hours prn).      FARXIGA 10 mg tablet       flaxseed Powd Take by mouth once daily.      furosemide (LASIX) 20 MG tablet Take 20 mg by mouth.      LORazepam (ATIVAN) 0.5 MG tablet Take 0.5 mg by mouth 2 (two) times daily.      olmesartan (BENICAR) 40 MG tablet Take 20 mg by mouth once daily.       spironolactone (ALDACTONE) 25 MG tablet Take 25 mg by mouth once daily.      torsemide (DEMADEX) 20 MG Tab Take 20 mg by mouth once daily.      tumeric-ging-olive-oreg-capryl 100 mg-150 mg- 50 mg-150 mg Cap Take by mouth once daily.      zolpidem (AMBIEN) 10 mg Tab Take 5 mg by mouth nightly as needed.      zolpidem (AMBIEN) 5 MG Tab        No current facility-administered medications on file prior to visit.        Review of patient's allergies indicates:   Allergen Reactions    Sacubitril-valsartan Hives and Swelling    Amlodipine Edema     Ankle    Lactose Other (See Comments)     GI distress    Lisinopril Other (See Comments)    Mefloquine Hives and Itching     Anti malarial     .    Environmental History: Pets in the home: none.  Tobacco Smoke in Home: no  Review of Systems   Constitutional:  Negative for chills and fever.   Respiratory:  Positive for shortness of breath.    Skin:  Negative for rash and wound.        Itching   Neurological:  Negative for facial asymmetry and speech difficulty.   Hematological:  Bruises/bleeds easily.   Psychiatric/Behavioral:  Negative for behavioral problems and sleep disturbance.    All other systems reviewed and are negative.     Objective:    Physical Exam  Vitals reviewed.   Constitutional:       General: He is not in acute distress.     Appearance: Normal appearance. He is not ill-appearing, toxic-appearing or diaphoretic.   HENT:      Head: Normocephalic and atraumatic.      Right Ear: Tympanic membrane, ear canal and external ear normal. There is no impacted cerumen.      Left Ear: Tympanic membrane, ear canal and external ear normal. There is no impacted cerumen.      Nose: Nose normal. No congestion or rhinorrhea.      Mouth/Throat:      Mouth: Mucous membranes are moist.      Pharynx: No oropharyngeal exudate or posterior oropharyngeal erythema.   Eyes:      General: No scleral icterus.        Right eye: No discharge.         Left eye: No discharge.      Pupils:  Pupils are equal, round, and reactive to light.   Neck:      Vascular: No carotid bruit.   Cardiovascular:      Rate and Rhythm: Normal rate and regular rhythm.      Heart sounds: Normal heart sounds. No murmur heard.    No friction rub. No gallop.   Pulmonary:      Effort: Pulmonary effort is normal. No respiratory distress.      Breath sounds: Normal breath sounds. No stridor. No wheezing, rhonchi or rales.   Chest:      Chest wall: No tenderness.   Musculoskeletal:         General: No swelling, tenderness, deformity or signs of injury. Normal range of motion.      Cervical back: Normal range of motion and neck supple. No rigidity or tenderness.      Right lower leg: No edema.      Left lower leg: No edema.   Lymphadenopathy:      Cervical: No cervical adenopathy.   Skin:     General: Skin is warm.      Coloration: Skin is not jaundiced.      Findings: No lesion or rash.   Neurological:      General: No focal deficit present.      Mental Status: He is alert and oriented to person, place, and time.      Gait: Gait normal.   Psychiatric:         Mood and Affect: Mood normal.         Behavior: Behavior normal.         Thought Content: Thought content normal.         Judgment: Judgment normal.         Assessment:       1. Itching    2. Rash    3. Drug allergy         Plan:       Itching  -     Cancel: Toxocara antibody; Future; Expected date: 01/12/2023  -     Cancel: Tryptase; Future; Expected date: 01/12/2023  -     Cancel: CU (Chronic Urticaria) Index Panel; Future; Expected date: 01/12/2023  -     Cancel: CBC Auto Differential; Future; Expected date: 01/12/2023  -     Cancel: Protein Electrophoresis, Serum; Future; Expected date: 01/12/2023  -     Cancel: RPR; Future; Expected date: 01/12/2023  -     Cancel: Comprehensive Metabolic Panel; Future; Expected date: 01/12/2023  -     Cancel: HIV 1/2 Ag/Ab (4th Gen); Future; Expected date: 01/12/2023  -     Cancel: Hepatitis C Antibody; Future; Expected date:  01/12/2023  -     Cancel: Hepatitis B Core Antibody, IgM; Future; Expected date: 01/12/2023  -     Cancel: STRONGYLOIDES IGG ANTIBODIES; Future; Expected date: 01/12/2023    Rash  -     Ambulatory referral/consult to Allergy    Drug allergy         He denies a rash. He reports itching around the eyes with Entresto, which he does not wish to restart.  Checking labs for etiologies of itching.  Will discuss with Dr. Johns.    He declined medications as he is currently not itching  He declined labs due to resolution of itching.    He reports that itching resolved with Farxiga.     RTC prn     MD,FACAAI                  Problems Address                                                 Amount and/or Complexity                                                                      Risk       3           [] 2 or more self-limited or minor problems                      [] Limited                                                                        [] Low                  [] 1 stable chronic illness                                                  Any combination of the two                                               OTC drugs                  []Acute, uncomplicated illness or injury                            Review of prior external notes from unique source           Minor surgery with no risk factors                                                                                                               [] 1 []2  []3+                                                                                                              Review of results from each unique test                                                                                                               [] 1 []2  [] 3+                                                                                                              Order of each unique test                                                                                                                [] 1 []2  [] 3+                                                                                                              Or                                                                                                             [] Assessment requiring an independent historian      4            [] One or more chronic illness with exacerbation,              [] Moderate                                                                      [x] Moderate                 Progression, or side effects of treatment                            -test documents or independent historians                        Prescription drug management                [x]  2 or more stable chronic illnesses                                    [] Independent interpretation of tests                              Minor surgery with identifiable risk                [] 1 undiagnosed new problem with uncertain prognosis    [] Discussion or management of test results                    elective major surgery                [] 1 acute illness with                systemic symptoms                                                                                                                                                              [] 1 acute complicated injury                                                                                                                                          Elective major surgery                                                                                                                                                                                                                                                                                                                                                                                                  5            [] 1 or more chronic illnesses with severe exacerbation,     [] Extensive(two from below)                                          [] High                                                                                                               [] Independent interpretation of results                         Drug therapy requiring intensive                                                                                                               []Discussion of management or test interpretation           monitoring                                                                                                                                                                                                       Decision to de-escalate care                 [] 1 acute or chronic illness or injury that poses a threat                                                                                               Decision regarding hospitalization                                                                                                                                                                                                            CC: Dr. Johns

## 2023-01-15 ENCOUNTER — HOSPITAL ENCOUNTER (OUTPATIENT)
Dept: SLEEP MEDICINE | Facility: HOSPITAL | Age: 83
Discharge: HOME | End: 2023-01-15
Attending: PHYSICIAN ASSISTANT
Payer: MEDICARE

## 2023-01-15 ENCOUNTER — NURSE TRIAGE (OUTPATIENT)
Dept: ADMINISTRATIVE | Facility: CLINIC | Age: 83
End: 2023-01-15
Payer: MEDICARE

## 2023-01-15 DIAGNOSIS — F51.04 PSYCHOPHYSIOLOGICAL INSOMNIA: ICD-10-CM

## 2023-01-15 DIAGNOSIS — G47.33 OSA (OBSTRUCTIVE SLEEP APNEA): Primary | ICD-10-CM

## 2023-01-15 DIAGNOSIS — G47.61 PLMD (PERIODIC LIMB MOVEMENT DISORDER): ICD-10-CM

## 2023-01-15 PROCEDURE — 95810 POLYSOM 6/> YRS 4/> PARAM: CPT

## 2023-01-15 NOTE — Clinical Note
DIAGNOSIS: Obstructive sleep apnea. 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 Sleep hygiene 2. CPAP titration 3. Correlation for RLS, check ferritin

## 2023-01-15 NOTE — TELEPHONE ENCOUNTER
Reason for Disposition   Common cold with no complications    Additional Information   Negative: SEVERE difficulty breathing (e.g., struggling for each breath, speaks in single words)   Negative: Sounds like a life-threatening emergency to the triager   Negative: [1] Difficulty breathing AND [2] not from stuffy nose (e.g., not relieved by cleaning out the nose)   Negative: Runny nose is caused by pollen or other allergies   Negative: Cough is main symptom   Negative: Severe sore throat   Negative: Fever > 104 F (40 C)   Negative: Patient sounds very sick or weak to the triager   Negative: [1] Fever > 101 F (38.3 C) AND [2] age > 60 years   Negative: [1] Fever > 100.0 F (37.8 C) AND [2] bedridden (e.g., nursing home patient, CVA, chronic illness, recovering from surgery)   Negative: [1] Fever > 100.0 F (37.8 C) AND [2] diabetes mellitus or weak immune system (e.g., HIV positive, cancer chemo, splenectomy, organ transplant, chronic steroids)   Negative: Fever present > 3 days (72 hours)   Negative: [1] Fever returns after gone for over 24 hours AND [2] symptoms worse or not improved   Negative: [1] Sinus pain (not just congestion) AND [2] fever   Negative: Earache   Negative: [1] SEVERE sore throat AND [2] present > 24 hours   Negative: [1] Sinus congestion (pressure, fullness) AND [2] present > 10 days   Negative: [1] Nasal discharge AND [2] present > 10 days   Negative: [1] Using nasal washes and pain medicine > 24 hours AND [2] sinus pain (lower forehead, cheekbone, or eye) persists   Negative: Sores with yellow scabs around the nasal opening    Protocols used: Common Cold-A-AH  pt called re sleep study tonight. this am when blowing nose - pt noted some blood tinged nasal secretions. otherwise feels fine. afeb, no cough, no SOB. covid test neg a week ago. Spoke with dr hogan re above. Pt fine to come in. Call back if febrile, sx worsen. Pt notified. Offered protocol advice.

## 2023-01-24 PROCEDURE — 95810 PR POLYSOMNOGRAPHY, 4 OR MORE: ICD-10-PCS | Mod: 26,,, | Performed by: INTERNAL MEDICINE

## 2023-01-24 PROCEDURE — 95810 POLYSOM 6/> YRS 4/> PARAM: CPT | Mod: 26,,, | Performed by: INTERNAL MEDICINE

## 2023-01-24 NOTE — PROCEDURES
"PATIENT: Edmund Chu  study Date: 1/15/2023  Referring Physician: Renetta Mei PA-C    Indications for Polysomnography: The patient is a 82 year year old Male who is 5' 9" and weighs 158.0 lbs.  His BMI equals 23.4.  A full night polysomnogram was performed to evaluate for -.  Hisory of DIVYA 2010 AT [facility]. Doesn't wear CPAP. Seen for insomnia. takes Ambien 10 mg sometimes cuts in half, takes with Ativan 1 mg.   Bed time 12 MN listens to audiobooks, tried Benadryl, tried melatonin, caused vivid dreams. Snores wakes unrefreshed, restless at night  Medical history hypertension, CHF, AICD EF 15-20%. Known right hemdiaphragm paralysis    Polysomnogram Data:  A full night polysomnogram recorded the standard physiologic parameters including EEG, EOG, EMG, EKG, nasal and oral airflow.  Respiratory parameters of chest and abdominal movements were recorded with Pe[place]-Crystal motion transducers.  Oxygen saturation was recorded by pulse oximetry.      Sleep Architecture:  The total recording time of the polysomnogram was 412.1 minutes.  The total sleep time was 293.0 minutes.  The patient spent 10.1% of total sleep time in Stage N1, 73.9% in Stage N2, 0.0% in Stages N3 and N, and 16.0% in REM.   Sleep latency was 9.1 minutes.  REM latency was 132.5 minutes.  Sleep Efficiency was 71.1%.  Sleep Maintenance Efficiency was 72.4%.  Total wake time was 119.5 minutes for a total wake percentage of 27.2%.    Respiratory Events:  The polysomnogram revealed a presence of 13 obstructive, 1 central, and - mixed apneas resulting in an Apnea index of 2.9 events per hour.  There were 11 hypopneas (using 3% desaturation criteria) resulting in a Hypopnea index of 2.3 events per hour.  The combined Apnea/Hypopnea index (using 3% desaturation criteria for Hypopneas) was 5.1 events per hour.    Baseline oxygen saturation was 95.0%.  The lowest oxygen saturation was 67.0%.      LIMB ACTIVITY:  There were 748 limb movements " "recorded.  Of this total, 745 were classified as PLMs.  Of the PLMs, 34 were associated with arousals.  The Limb Movement index was 153.2 per hour while the PLM index was 152.6 per hour.    CARDIAC SUMMARY:   The average pulse rate was 69.1 bpm.  The minimum pulse rate was 21.0 bpm while the maximum pulse rate was 214.0 bpm.    CONCLUSION:  AHI was 5 .1/hr REM AHI 15.3/hr: Mild to Moderate obstructive sleep apnea  Insomnia: Awake 23:02 hr to 23:32 hrs and 02:28 hrs to 03:26 hrs  PLM index was 152.6/hr  SpO2 was below 88% for 4.1 minutes                                    DIAGNOSIS: Obstructive sleep apnea. Psychophysiological Insomnia / 307.42, PLMD    RECOMMENDATIONS:  Sleep hygiene  CPAP titration  Correlation for RLS, check ferritin      Dear Renetta Mei, PA-C  [address] [place] [address] [place] [id number]/Srini Scott MD         The sleep study that you ordered is complete.  You have ordered sleep LAB services to perform the sleep study for Edmund Chu .      Please find Sleep Study result in  the "Media tab" of Chart Review menu.        You can look  for the report in the  Media by the document type "Sleep Study Documents". Alphabetizing  "Document type" column helps to find the SLEEP STUDY report  Faster.       As the ordering provider, you are responsible for reviewing the results and implementing a treatment plan with your patient.    If you need a Sleep Medicine provider to explain the sleep study findings and arrange treatment for the patient, please refer patient for consultation to our Sleep Clinic via [facility] with Ambulatory Consult Sleep.     To do that please place an order for an  "Ambulatory Consult Sleep" -  order , it will go to our clinic work queue for our staff  to contact the patient for an appointment.      For any questions, please contact our sleep lab  staff at [phone] to talk to clinical staff          Helder Noyola MD   "

## 2023-01-27 DIAGNOSIS — I50.42 CHRONIC COMBINED SYSTOLIC AND DIASTOLIC CONGESTIVE HEART FAILURE: Primary | ICD-10-CM

## 2023-01-29 ENCOUNTER — CLINICAL SUPPORT (OUTPATIENT)
Dept: CARDIOLOGY | Facility: HOSPITAL | Age: 83
End: 2023-01-29
Payer: MEDICARE

## 2023-01-29 DIAGNOSIS — Z95.810 PRESENCE OF AUTOMATIC (IMPLANTABLE) CARDIAC DEFIBRILLATOR: ICD-10-CM

## 2023-01-29 PROCEDURE — 93295 DEV INTERROG REMOTE 1/2/MLT: CPT | Mod: ,,, | Performed by: INTERNAL MEDICINE

## 2023-01-29 PROCEDURE — 93295 CARDIAC DEVICE CHECK - REMOTE: ICD-10-PCS | Mod: ,,, | Performed by: INTERNAL MEDICINE

## 2023-01-30 ENCOUNTER — HOSPITAL ENCOUNTER (OUTPATIENT)
Dept: CARDIOLOGY | Facility: HOSPITAL | Age: 83
Discharge: HOME | End: 2023-01-30
Attending: INTERNAL MEDICINE
Payer: MEDICARE

## 2023-01-30 ENCOUNTER — OFFICE VISIT (OUTPATIENT)
Dept: CARDIOLOGY | Facility: CLINIC | Age: 83
End: 2023-01-30
Payer: MEDICARE

## 2023-01-30 VITALS
DIASTOLIC BLOOD PRESSURE: 64 MMHG | OXYGEN SATURATION: 99 % | BODY MASS INDEX: 22.27 KG/M2 | HEIGHT: 69 IN | SYSTOLIC BLOOD PRESSURE: 108 MMHG | WEIGHT: 150.38 LBS | HEART RATE: 56 BPM

## 2023-01-30 DIAGNOSIS — Z95.810 ICD (IMPLANTABLE CARDIOVERTER-DEFIBRILLATOR) IN PLACE: ICD-10-CM

## 2023-01-30 DIAGNOSIS — I50.42 CHRONIC COMBINED SYSTOLIC AND DIASTOLIC CONGESTIVE HEART FAILURE: ICD-10-CM

## 2023-01-30 DIAGNOSIS — Z95.2 S/P AVR (AORTIC VALVE REPLACEMENT): ICD-10-CM

## 2023-01-30 DIAGNOSIS — I50.42 CHRONIC COMBINED SYSTOLIC AND DIASTOLIC CONGESTIVE HEART FAILURE: Primary | ICD-10-CM

## 2023-01-30 DIAGNOSIS — I10 PRIMARY HYPERTENSION: ICD-10-CM

## 2023-01-30 PROCEDURE — 99215 OFFICE O/P EST HI 40 MIN: CPT | Mod: S$PBB,,, | Performed by: INTERNAL MEDICINE

## 2023-01-30 PROCEDURE — 93282 PRGRMG EVAL IMPLANTABLE DFB: CPT

## 2023-01-30 PROCEDURE — 99999 PR PBB SHADOW E&M-EST. PATIENT-LVL IV: ICD-10-PCS | Mod: PBBFAC,,, | Performed by: INTERNAL MEDICINE

## 2023-01-30 PROCEDURE — 93010 EKG 12-LEAD: ICD-10-PCS | Mod: ,,, | Performed by: INTERNAL MEDICINE

## 2023-01-30 PROCEDURE — 93005 ELECTROCARDIOGRAM TRACING: CPT

## 2023-01-30 PROCEDURE — 99215 PR OFFICE/OUTPT VISIT, EST, LEVL V, 40-54 MIN: ICD-10-PCS | Mod: S$PBB,,, | Performed by: INTERNAL MEDICINE

## 2023-01-30 PROCEDURE — 93282 CARDIAC DEVICE CHECK - IN CLINIC & HOSPITAL: ICD-10-PCS | Mod: 26,,, | Performed by: INTERNAL MEDICINE

## 2023-01-30 PROCEDURE — 99214 OFFICE O/P EST MOD 30 MIN: CPT | Mod: PBBFAC | Performed by: INTERNAL MEDICINE

## 2023-01-30 PROCEDURE — 93282 PRGRMG EVAL IMPLANTABLE DFB: CPT | Mod: 26,,, | Performed by: INTERNAL MEDICINE

## 2023-01-30 PROCEDURE — 93010 ELECTROCARDIOGRAM REPORT: CPT | Mod: ,,, | Performed by: INTERNAL MEDICINE

## 2023-01-30 PROCEDURE — 99999 PR PBB SHADOW E&M-EST. PATIENT-LVL IV: CPT | Mod: PBBFAC,,, | Performed by: INTERNAL MEDICINE

## 2023-01-30 NOTE — PROGRESS NOTES
Subjective:   Patient ID:  Edmund Chu is a 82 y.o. male     Chief complaint:CHF      HPI  New patient to me as of 09/26/2022.  Referred by Dr Johns for evaluation and management of CHF/ICD/PAF   --   Background as gleaned from patient's records and today's interview :  81 y.o. male with h/o hypertension, CHF, AVR 2016 ([facility]), h/o colon cancer   Has had CHF since 2009- did not want ICD until now   Aortic valve replacement, no aspirin due to mild bruising   Echo from [facility] in jan 2022:  Final Impressions   1. Moderately enlarged left ventricular chamber size, moderate generalized hypokinesis,   calculated 2-D biplane volumetric ejection fraction 29%.   2. Abnormal left ventricular geometry with  eccentric left ventricular hypertrophy.   3. Moderately enlarged right ventricular chamber size, moderate-severely reduced systolic   function, estimated right ventricular systolic pressure 58 mmHg (right atrial pressure of 15   mmHg).   4. Status post 23 mm Freire II porcine aortic valve prosthesis. Mean gradient; 12 mmHg.   Trivial prosthetic and periprosthetic regurgitation.   5. Mild-moderate tricuspid valve regurgitation.   6. Normal mid ascending aorta diameter (diameter 32 mm at mid level).   7. Mildly enlarged inferior vena cava size with reduced inspiratory collapse (<50%).   8. Compared to the report of 05/16/2015 no significant change has occurred.     [facility] 2/21:  Left main 20% stenosis   Lad:  Mid LAD 50% stenosis   Left circumflex: Diffuse irregularity  Mid RCA proximal portion 50% stenosis  Distal RCA 50% stenosis   Ramus diffuse irregularity     PFTs:  3/7/2017  5:20 PM   Spirometry is abnormal. The forced vital capacity is mildly   reduced. The flows are abnormal. After bronchodilator there was   significant improvement in the FEV1. The total lung capacity is   mildly reduced. Diffusion is normal.Mixed obstructive restrictive   impairment of a mild to moderate degree. There is  reversibility       Latest Reference Range & Units 07/27/22 10:04 08/05/22 12:14 08/17/22 12:33   BNP 0 - 99 pg/mL 2,819 (H) 1,369 (H) 1,441 (H)        Latest Reference Range & Units 08/31/22 09:39   Sodium 136 - 145 mmol/L 137   Potassium 3.5 - 5.1 mmol/L 4.5   Chloride 95 - 110 mmol/L 99   CO2 23 - 29 mmol/L 27   Anion Gap 8 - 16 mmol/L 11   BUN 8 - 23 mg/dL 34 (H)   Creatinine 0.5 - 1.4 mg/dL 1.6 (H)   eGFR >60 mL/min/1.73 m^2 43.6 !   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  His symptoms of dyspnea and orthopnea have somewhat abated.    He tried Entresto and he says that he was allergic to it (itching around the eyes). However, he is on Benicar - he does have a rash on his back.   His BPs at home were rather soft in jude  Last week 102/61 to 136/81.      Has had a DCCV in 3/2021  Has been with worse CHF Sx since 10/2021.   >>   He is a good candidate in theory for ICD implantation.  I am not sure how decreased his is functionality.  He seems to me to be closer to class 3 than class 2.  Still, there may be a statistical benefit the having an ICD implant.  Also, he is probably not on maximum tolerable medical therapy.  Will try to advance this.   >>  Add tissue Doppler indices to echocardiogram.  Review and decide whether CRT may be helpful.  If he has significant dyssynchrony, we may consider CRT.  If not, we may consider other heart failure devices such as Barostim or CCM.  6 minute walk now  Chest x-ray PA and lateral.  Start SGLT2 inhibitor at 10 mg per day.  Obtain BMP in 1 week.  Patient has the rash on his back and may be drug related.  Will refer to dermatology for evaluation.    Patient is supposed to have an allergic reaction to ARB.   Will refer to allergy because we are interested in starting Entresto if at all possible.     I have discussed the ICD implant procedure in detail with the patient. I described its benefits and risks. I reviewed alternative therapies and discussed their potential value. The patient was given ample opportunity to express concerns and ask questions and I provided appropriate responses and  answers to such.The patient understands and agrees to proceed.  Consent form was signed today by patient and myself and appropriately witnessed.      Update 12/12/2022:  Had an ICD implant on 10/27/2022  Bio Acticor 7 VRT-Dx [id number], [id number], PID: [id number]   ICD lead: Bio Plexa Pro MRI S-Dx 65/17, [id number]   Sub-pectoral placement     This was evaluated today and is in excellent repair.     Since then, he has taken himself off of his CHF medications due to reasons of his own.  He has also manipulated the his diuretic doses and switched back and forth between furosemide and torsemide.     He is complaining of more NAVARRO, orthopnea (equivalent to 5 pillows) and leg edema.     He is here today with his wife and daughter.  >>  His main issue is that he self medicates  >>  I had a long talk with him and his family and told him that treatment with diuretics may only help symptoms but does not really change the course of the disease.  I also told him that he has been stopping all the disease altering medications and that he should expect multiple classes of drugs to be added to his regimen.  Short of that, he will continue to have complaints related to CHF.  Also told him that if he expects us to help him he needs to allow us to advance his GDM T without push back.  Since he  seems to suggest that some of the drugs may have caused allergies (his descriptions of symptoms do not suggest that) we will refer him to allergy.    In the meantime I will reinstitute Farxiga then after equilibration on this medication, would proceed to introduce Entresto  (this is 1 of the drugs that he contends may be causing allergies) and if feasible, spironolactone.  I will refer him to Ms. May to up titrate his medications.    Update 02/09/2023 :  Had VDx implant in October   Meds adjustments were then made  Recent visit with Dr Johns:  1/9/23  Still having issues sleeping  Taking medications for sleep  Feels better when he gets good sleep   Echo with EF 15-20%, mod-severe TR/MR (worsening from prior)  Taking 1.5 torsemide daily   SOB has improved  Restarted farxiga   He has been feeling better.  He can now walk a 8/10th of a mi without too much trouble.  He is planning a trip for next week.  Current Outpatient Medications   Medication Sig    carvediloL (COREG) 12.5 MG tablet Take 2 tablets (25 mg total) by mouth 2 (two) times daily with meals.    coenzyme Q10 100 mg capsule Take 100 mg by mouth once daily.    dietary supplement,misc comb14 24-21 mg Cap Take by mouth.    FARXIGA 10 mg tablet     flaxseed Powd Take by mouth once daily.    LORazepam (ATIVAN) 0.5 MG tablet Take 0.5 mg by mouth 2 (two) times daily.    olmesartan (BENICAR) 40 MG tablet Take 20 mg by mouth once daily.    torsemide (DEMADEX) 20 MG Tab Take 20 mg by mouth once daily.    tumeric-ging-olive-oreg-capryl 100 mg-150 mg- 50 mg-150 mg Cap Take by mouth once daily.    zolpidem (AMBIEN) 10 mg Tab Take 5 mg by mouth nightly as needed.    zolpidem (AMBIEN) 5 MG Tab     albuterol (VENTOLIN HFA) 90 mcg/actuation inhaler Inhale 2 puffs into the lungs every 6 (six) hours as needed for Wheezing. Rescue    carboxymethylcellulose (REFRESH PLUS) 0.5 % Dpet 1 drop 3 (three) times daily as needed.    diphenhydrAMINE (BENADRYL) 25 mg capsule Take 25 mg by mouth every 6 (six) hours as needed for Itching (q 6 hours prn).    furosemide (LASIX) 20 MG tablet Take 20 mg by mouth.    spironolactone (ALDACTONE) 25 MG tablet Take 25 mg by mouth once daily.     No current facility-administered medications for this visit.        Review of Systems     Constitutional: Reviewed  for decreased appetite, weight gain and weight loss.   HENT: Reviewed for nosebleeds.    Eyes:  Reviewed for blurred vision and visual disturbance.   Cardiovascular: Reviewed for chest pain, claudication, cyanosis,dyspnea on exertion, leg swelling, orthopnea,paroxysmal nocturnal dyspnearregular heartbeats, palpitations, near-syncope, and syncope.   Respiratory: Reviewed for cough, shortness of breath, wheezing, sleep disturbances due to breathing and snoring, .    Endocrine: Reviewed for heat intolerance.   Hematologic/Lymphatic: Reviewed for easy bruisability/bleeding.   Skin: Reviewed for rash.   Musculoskeletal: Reviewed for muscle weakness and myalgias.   Gastrointestinal: Reviewed for abdominal pain, anorexia, melena, nausea and vomiting.   Genitourinary: Reviewed for hesitancy, frequency, nocturia and incontinence.   Neurological: Reviewed for excessive daytime sleepiness, dizziness, vertigo, weakness, headaches, loss of balance and seizures,   Psychiatric/Behavioral:  Reviewed for insomnia, altered mental status, depression, anxiety and nervousness.       All symptoms reviewed above were negative except for dyspnea on exertion.       Social History     Tobacco Use   Smoking Status Former   Smokeless Tobacco Never        Objective:     Physical Exam  Vitals and nursing note reviewed.   Constitutional:       Appearance: Normal appearance. He is well-developed.   HENT:      Head: Normocephalic and atraumatic.      Right Ear: External ear normal.      Left Ear: External ear normal.   Eyes:      Conjunctiva/sclera: Conjunctivae normal.      Left eye: Left conjunctiva is not injected. No hemorrhage.     Pupils: Pupils are equal, round, and reactive to light.   Neck:      Thyroid: No thyromegaly.      Vascular: No JVD.   Cardiovascular:      Rate and Rhythm: Normal rate and regular rhythm.      Chest Wall: PMI is not displaced.      Pulses: Intact distal pulses.   "         Carotid pulses are 2+ on the right side and 2+ on the left side.       Radial pulses are 2+ on the right side and 2+ on the left side.        Dorsalis pedis pulses are 2+ on the right side and 2+ on the left side.        Posterior tibial pulses are 2+ on the right side and 2+ on the left side.      Heart sounds: Normal heart sounds. No midsystolic click and no opening snap. No murmur heard.    No friction rub. No gallop.   Pulmonary:      Effort: Pulmonary effort is normal. No respiratory distress.      Breath sounds: Normal breath sounds. No wheezing or rales.   Chest:      Chest wall: No tenderness.      Comments: Device pocket is in excellent repair.  Abdominal:      Palpations: Abdomen is soft. Abdomen is not rigid. There is no hepatomegaly.      Tenderness: There is no abdominal tenderness.   Musculoskeletal:         General: No tenderness. Normal range of motion.      Cervical back: Neck supple.      Right knee: No swelling.      Left knee: No swelling.      Right lower leg: No swelling.      Left lower leg: No swelling.      Right ankle: No swelling.      Left ankle: No swelling.      Right foot: No swelling.      Left foot: No swelling.   Skin:     General: Skin is warm and dry.      Findings: No rash.   Neurological:      Mental Status: He is alert and oriented to person, place, and time.      Cranial Nerves: No cranial nerve deficit.      Coordination: Coordination normal.      Deep Tendon Reflexes: Reflexes are normal and symmetric.   Psychiatric:         Behavior: Behavior normal.     /64   Pulse (!) 56   Ht 5' 9" (1.753 m)   Wt 68.2 kg (150 lb 5.7 oz)   SpO2 99%   BMI 22.20 kg/m²       Results for orders placed during the hospital encounter of 12/19/22    Echo    Interpretation Summary  · The left ventricle is mildly enlarged with severely decreased systolic function.  · The estimated ejection fraction is 15 - 20%.  · Grade II left ventricular diastolic dysfunction.  · Moderate right " ventricular enlargement with severely reduced right ventricular systolic function.  · Severe left atrial enlargement.  · Severe right atrial enlargement.  · There is a 23 mm Freire II porcine bioprosthetic aortic valve present. There is trivial central aortic insufficiency present.  · The aortic valve mean gradient is 6 mmHg with a dimensionless index of 0.27.  · Moderate-to-severe mitral regurgitation.  · Moderate to severe tricuspid regurgitation.  · Moderate pulmonic regurgitation.  · The estimated PA systolic pressure is greater than 54 mmHg.  · There is pulmonary hypertension.    WBC   Date Value Ref Range Status   10/06/2022 5.68 3.90 - 12.70 K/uL Final     Hematocrit   Date Value Ref Range Status   10/06/2022 43.6 40.0 - 54.0 % Final     Hemoglobin   Date Value Ref Range Status   10/06/2022 14.0 14.0 - 18.0 g/dL Final     Lab Results   Component Value Date     (L) 10/06/2022     Lab Results   Component Value Date    CREATININE 1.5 (H) 01/30/2023    EGFRNORACEVR 46.8 (A) 01/30/2023    K 4.3 01/30/2023     Lab Results   Component Value Date    BNP 1,099 (H) 01/30/2023            reports current alcohol use.  Past Medical History:   Diagnosis Date    CHF (congestive heart failure)     Hypertension      Past Surgical History:   Procedure Laterality Date    AORTIC VALVE REPLACEMENT  2016    colon section removal       Family History   Problem Relation Age of Onset    Hypertension Mother     Colon cancer Father        Assessment:    Improving.  We can still improve his the medical therapy protocol.  1. Chronic combined systolic and diastolic congestive heart failure    2. S/P AVR (aortic valve replacement)    3. ICD (implantable cardioverter-defibrillator) in place    4. Primary hypertension        Plan:    It would be helpful if we could start him on Entresto.  He says that he may have had some allergic reaction to it.  His description of the so-called allergy is however unclear but not necessarily  impossible to be a true allergy.  Accordingly, we will wait until he comes back from his trip before planning to start anything.  Orders Placed This Encounter   Procedures    Basic metabolic panel     Standing Status:   Future     Number of Occurrences:   1     Standing Expiration Date:   3/30/2024    BNP     Standing Status:   Future     Number of Occurrences:   1     Standing Expiration Date:   3/30/2024       Follow up in about 6 weeks (around 3/13/2023), or if symptoms worsen or fail to improve.    There are no discontinued medications.         Medication List with Changes/Refills   Current Medications    ALBUTEROL (VENTOLIN HFA) 90 MCG/ACTUATION INHALER    Inhale 2 puffs into the lungs every 6 (six) hours as needed for Wheezing. Rescue    CARBOXYMETHYLCELLULOSE (REFRESH PLUS) 0.5 % DPET    1 drop 3 (three) times daily as needed.    CARVEDILOL (COREG) 12.5 MG TABLET    Take 2 tablets (25 mg total) by mouth 2 (two) times daily with meals.    COENZYME Q10 100 MG CAPSULE    Take 100 mg by mouth once daily.    DIETARY SUPPLEMENT,MISC COMB14 24-21 MG CAP    Take by mouth.    DIPHENHYDRAMINE (BENADRYL) 25 MG CAPSULE    Take 25 mg by mouth every 6 (six) hours as needed for Itching (q 6 hours prn).    FARXIGA 10 MG TABLET        FLAXSEED POWD    Take by mouth once daily.    FUROSEMIDE (LASIX) 20 MG TABLET    Take 20 mg by mouth.    LORAZEPAM (ATIVAN) 0.5 MG TABLET    Take 0.5 mg by mouth 2 (two) times daily.    OLMESARTAN (BENICAR) 40 MG TABLET    Take 20 mg by mouth once daily.    SPIRONOLACTONE (ALDACTONE) 25 MG TABLET    Take 25 mg by mouth once daily.    TORSEMIDE (DEMADEX) 20 MG TAB    Take 20 mg by mouth once daily.    TUMERIC-GING-OLIVE-OREG-CAPRYL 100 MG-150 MG- 50 MG-150 MG CAP    Take by mouth once daily.    ZOLPIDEM (AMBIEN) 10 MG TAB    Take 5 mg by mouth nightly as needed.    ZOLPIDEM (AMBIEN) 5 MG TAB           This note is at least partially dictated using the M*Modal Fluency Direct word recognition  program. There are word recognition mistakes that are occasionally missed on review.

## 2023-02-07 ENCOUNTER — PATIENT MESSAGE (OUTPATIENT)
Dept: CARDIOLOGY | Facility: CLINIC | Age: 83
End: 2023-02-07
Payer: MEDICARE

## 2023-02-07 ENCOUNTER — TELEPHONE (OUTPATIENT)
Dept: CARDIOLOGY | Facility: CLINIC | Age: 83
End: 2023-02-07
Payer: MEDICARE

## 2023-02-07 NOTE — TELEPHONE ENCOUNTER
Pt notified per the portal. Was asked to contact the office when he returns to [facility] pb      ----- Message from Amos Grider MD sent at 2/3/2023  9:42 PM CST -----  See comments below and call patient to discuss.   Please close encounter when done -- no need to route back to me.  Thanks  His BNP is much better but still hi   GFR is also better   If I recall this well, he will be out [facility] for a while then we will consider re-trying him on Entresto - as per clinic note plan

## 2023-02-23 DIAGNOSIS — I50.42 CHRONIC COMBINED SYSTOLIC AND DIASTOLIC CONGESTIVE HEART FAILURE: Primary | ICD-10-CM

## 2023-02-23 DIAGNOSIS — R06.02 SOB (SHORTNESS OF BREATH): ICD-10-CM

## 2023-02-23 DIAGNOSIS — I10 HYPERTENSION, UNSPECIFIED TYPE: ICD-10-CM

## 2023-02-25 ENCOUNTER — NURSE TRIAGE (OUTPATIENT)
Dept: ADMINISTRATIVE | Facility: CLINIC | Age: 83
End: 2023-02-25
Payer: MEDICARE

## 2023-02-25 NOTE — TELEPHONE ENCOUNTER
Last cardiology visit was told that he wanted him to try Entresto and the patient declined because it previoulsy caused his eye swelling. PCP in turn prescribed Entresto and caller started on 2/9. Patient started having swelling and pain in the right hand about 10 days after started the medication. The pain was really painful. Took tylenol and did not help. Took a narcotic and the pain has gotten better. The swelling is still present and is still a lot of swelling with no pain. PCP had an X-ray done of the hand and determined that nothing was broken but noted extreme arthritis. Swelling has been present for a week.  Reason for Disposition   [1] Small area of LOCALIZED swelling AND [2] not better after 3 days    Additional Information   Negative: SEVERE difficulty breathing (e.g., struggling for each breath, speaks in single words)   Negative: Sounds like a life-threatening emergency to the triager   Negative: Chest pain   Negative: Followed an arm injury   Negative: Small area of LOCALIZED swelling followed an insect bite to the area   Negative: Swelling of wrist is main symptom   Negative: Swelling of elbow is main symptom   Negative: Cast problems or questions   Negative: Pain, redness, or swelling intravenous (IV) site or along course of vein   Negative: SEVERE arm swelling (e.g., all of arm looks swollen)   Negative: [1] MODERATE arm swelling (e.g., puffiness or swollen feeling of entire arm) and [2] PICC Line   Negative: [1] MODERATE arm swelling and [2] central venous catheter (central line, internal jugular line)   Negative: Difficulty breathing at rest   Negative: Looks like a broken bone or dislocated joint (e.g., crooked or deformed)   Negative: Entire hand is cool or blue in comparison to other side   Negative: [1] Can't use arm or can barely use arm AND [2] new-onset   Negative: [1] Difficulty breathing with exertion (e.g., walking) AND [2] new-onset or worsening   Negative: [1] Red  area or streak AND [2] fever   Negative: [1] Swelling is painful to touch AND [2] fever   Negative: [1] Cast on arm AND [2] now increased pain   Negative: Patient sounds very sick or weak to the triager   Negative: [1] Pregnant 20 or more weeks AND [2] face swelling   Negative: [1] Pregnant 20 or more weeks AND [2] new blurred vision or vision changes   Negative: [1] Postpartum (from 0 to 6 weeks after delivery) AND [2] new blurred vision or vision changes   Negative: [1] Postpartum (from 0 to 6 weeks after delivery) AND [2] face swelling   Negative: [1] Red area or streak [2] large (> 2 in. or 5 cm)   Negative: MODERATE arm swelling (e.g., puffiness or swollen feeling of entire arm)   Negative: [1] MILD swelling of both arms AND [2] new-onset or worsening   Negative: Looks like a boil, infected sore, deep ulcer or other infected rash (spreading redness, pus)   Negative: Face swelling    Protocols used: Arm Swelling and Edema-A-AH

## 2023-02-27 ENCOUNTER — HOSPITAL ENCOUNTER (OUTPATIENT)
Dept: CARDIOLOGY | Facility: HOSPITAL | Age: 83
Discharge: HOME | End: 2023-02-27
Attending: INTERNAL MEDICINE
Payer: MEDICARE

## 2023-02-27 ENCOUNTER — OFFICE VISIT (OUTPATIENT)
Dept: CARDIOLOGY | Facility: CLINIC | Age: 83
End: 2023-02-27
Payer: MEDICARE

## 2023-02-27 VITALS
OXYGEN SATURATION: 98 % | BODY MASS INDEX: 22.08 KG/M2 | WEIGHT: 149.06 LBS | DIASTOLIC BLOOD PRESSURE: 70 MMHG | SYSTOLIC BLOOD PRESSURE: 104 MMHG | HEART RATE: 68 BPM | HEIGHT: 69 IN

## 2023-02-27 DIAGNOSIS — I50.42 CHRONIC COMBINED SYSTOLIC AND DIASTOLIC CONGESTIVE HEART FAILURE: ICD-10-CM

## 2023-02-27 DIAGNOSIS — I49.3 PVC'S (PREMATURE VENTRICULAR CONTRACTIONS): ICD-10-CM

## 2023-02-27 DIAGNOSIS — I50.42 CHRONIC COMBINED SYSTOLIC AND DIASTOLIC CONGESTIVE HEART FAILURE: Primary | ICD-10-CM

## 2023-02-27 DIAGNOSIS — R06.02 SOB (SHORTNESS OF BREATH): ICD-10-CM

## 2023-02-27 DIAGNOSIS — Z95.2 S/P AVR (AORTIC VALVE REPLACEMENT): ICD-10-CM

## 2023-02-27 DIAGNOSIS — I10 HYPERTENSION, UNSPECIFIED TYPE: ICD-10-CM

## 2023-02-27 DIAGNOSIS — I45.10 RBBB: ICD-10-CM

## 2023-02-27 DIAGNOSIS — Z95.810 ICD (IMPLANTABLE CARDIOVERTER-DEFIBRILLATOR) IN PLACE: ICD-10-CM

## 2023-02-27 DIAGNOSIS — I50.9 CONGESTIVE HEART FAILURE, UNSPECIFIED HF CHRONICITY, UNSPECIFIED HEART FAILURE TYPE: ICD-10-CM

## 2023-02-27 DIAGNOSIS — G47.33 OSA (OBSTRUCTIVE SLEEP APNEA): ICD-10-CM

## 2023-02-27 DIAGNOSIS — I10 PRIMARY HYPERTENSION: ICD-10-CM

## 2023-02-27 PROCEDURE — 93005 ELECTROCARDIOGRAM TRACING: CPT

## 2023-02-27 PROCEDURE — 99213 OFFICE O/P EST LOW 20 MIN: CPT | Mod: PBBFAC | Performed by: INTERNAL MEDICINE

## 2023-02-27 PROCEDURE — 93010 ELECTROCARDIOGRAM REPORT: CPT | Mod: ,,, | Performed by: INTERNAL MEDICINE

## 2023-02-27 PROCEDURE — 93010 EKG 12-LEAD: ICD-10-PCS | Mod: ,,, | Performed by: INTERNAL MEDICINE

## 2023-02-27 PROCEDURE — 99999 PR PBB SHADOW E&M-EST. PATIENT-LVL III: CPT | Mod: PBBFAC,,, | Performed by: INTERNAL MEDICINE

## 2023-02-27 PROCEDURE — 99215 PR OFFICE/OUTPT VISIT, EST, LEVL V, 40-54 MIN: ICD-10-PCS | Mod: S$PBB,25,, | Performed by: INTERNAL MEDICINE

## 2023-02-27 PROCEDURE — 99999 PR PBB SHADOW E&M-EST. PATIENT-LVL III: ICD-10-PCS | Mod: PBBFAC,,, | Performed by: INTERNAL MEDICINE

## 2023-02-27 PROCEDURE — 99215 OFFICE O/P EST HI 40 MIN: CPT | Mod: S$PBB,25,, | Performed by: INTERNAL MEDICINE

## 2023-02-27 RX ORDER — SPIRONOLACTONE 25 MG/1
25 TABLET ORAL DAILY
Qty: 90 TABLET | Refills: 3 | Status: SHIPPED | OUTPATIENT
Start: 2023-02-27 | End: 2023-04-17

## 2023-03-05 PROBLEM — I49.3 PVC'S (PREMATURE VENTRICULAR CONTRACTIONS): Status: ACTIVE | Noted: 2023-03-05

## 2023-03-05 PROBLEM — I45.10 RBBB: Status: ACTIVE | Noted: 2023-03-05

## 2023-03-05 NOTE — PROGRESS NOTES
Subjective:   Patient ID:  Edmund Chu is a 82 y.o. male     Chief complaint:  CHF    HPI  New patient to me as of 09/26/2022.  Referred by Dr Johns for evaluation and management of CHF/ICD/PAF   --   Background as gleaned from patient's records and today's interview :  81 y.o. male with h/o hypertension, CHF, AVR 2016 ([facility]), h/o colon cancer   Has had CHF since 2009- did not want ICD until now   Aortic valve replacement, no aspirin due to mild bruising   Echo from [facility] in jan 2022:  Final Impressions   1. Moderately enlarged left ventricular chamber size, moderate generalized hypokinesis,   calculated 2-D biplane volumetric ejection fraction 29%.   2. Abnormal left ventricular geometry with  eccentric left ventricular hypertrophy.   3. Moderately enlarged right ventricular chamber size, moderate-severely reduced systolic   function, estimated right ventricular systolic pressure 58 mmHg (right atrial pressure of 15   mmHg).   4. Status post 23 mm Freire II porcine aortic valve prosthesis. Mean gradient; 12 mmHg.   Trivial prosthetic and periprosthetic regurgitation.   5. Mild-moderate tricuspid valve regurgitation.   6. Normal mid ascending aorta diameter (diameter 32 mm at mid level).   7. Mildly enlarged inferior vena cava size with reduced inspiratory collapse (<50%).   8. Compared to the report of 05/16/2015 no significant change has occurred.     [facility] 2/21:  Left main 20% stenosis   Lad:  Mid LAD 50% stenosis   Left circumflex: Diffuse irregularity  Mid RCA proximal portion 50% stenosis  Distal RCA 50% stenosis   Ramus diffuse irregularity     PFTs:  3/7/2017  5:20 PM   Spirometry is abnormal. The forced vital capacity is mildly   reduced. The flows are abnormal. After bronchodilator there was   significant improvement in the FEV1. The total lung capacity is   mildly reduced. Diffusion is normal.Mixed obstructive restrictive   impairment of a mild to moderate degree. There is  reversibility       Latest Reference Range & Units 07/27/22 10:04 08/05/22 12:14 08/17/22 12:33   BNP 0 - 99 pg/mL 2,819 (H) 1,369 (H) 1,441 (H)        Latest Reference Range & Units 08/31/22 09:39   Sodium 136 - 145 mmol/L 137   Potassium 3.5 - 5.1 mmol/L 4.5   Chloride 95 - 110 mmol/L 99   CO2 23 - 29 mmol/L 27   Anion Gap 8 - 16 mmol/L 11   BUN 8 - 23 mg/dL 34 (H)   Creatinine 0.5 - 1.4 mg/dL 1.6 (H)   eGFR >60 mL/min/1.73 m^2 43.6 !   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  His symptoms of dyspnea and orthopnea have somewhat abated.    He tried Entresto and he says that he was allergic to it (itching around the eyes). However, he is on Benicar - he does have a rash on his back.   His BPs at home were rather soft in jude  Last week 102/61 to 136/81.      Has had a DCCV in 3/2021  Has been with worse CHF Sx since 10/2021.   >>   He is a good candidate in theory for ICD implantation.  I am not sure how decreased his is functionality.  He seems to me to be closer to class 3 than class 2.  Still, there may be a statistical benefit the having an ICD implant.  Also, he is probably not on maximum tolerable medical therapy.  Will try to advance this.   >>  Add tissue Doppler indices to echocardiogram.  Review and decide whether CRT may be helpful.  If he has significant dyssynchrony, we may consider CRT.  If not, we may consider other heart failure devices such as Barostim or CCM.  6 minute walk now  Chest x-ray PA and lateral.  Start SGLT2 inhibitor at 10 mg per day.  Obtain BMP in 1 week.  Patient has the rash on his back and may be drug related.  Will refer to dermatology for evaluation.    Patient is supposed to have an allergic reaction to ARB.   Will refer to allergy because we are interested in starting Entresto if at all possible.     I have discussed the ICD implant procedure in detail with the patient. I described its benefits and risks. I reviewed alternative therapies and discussed their potential value. The patient was given ample opportunity to express concerns and ask questions and I provided appropriate responses and  answers to such.The patient understands and agrees to proceed.  Consent form was signed today by patient and myself and appropriately witnessed.      Update 12/12/2022:  Had an ICD implant on 10/27/2022  Bio Acticor 7 VRT-Dx [id number], [id number], PID: [id number]   ICD lead: Bio Plexa Pro MRI S-Dx 65/17, [id number]   Sub-pectoral placement     This was evaluated today and is in excellent repair.     Since then, he has taken himself off of his CHF medications due to reasons of his own.  He has also manipulated the his diuretic doses and switched back and forth between furosemide and torsemide.     He is complaining of more NAVARRO, orthopnea (equivalent to 5 pillows) and leg edema.     He is here today with his wife and daughter.      Update 03/05/2023 :  He had the VDx implant in January.  ICD and lead implanted 10/27/22, [employer]   Bio Acticor 7 VRT-Dx [id number], [id number], PID: [id number]   ICD lead: Bio Plexa Pro MRI S-Dx 65/17, [id number]   1/30/23-underlying SB @ 56, intact conduction @ 200ms, , RBBB   Sub-pectoral placement  After being initially self medicating, he has endorsed the plans that we have set for him and is following instructions.    He came back from his trip and attempted restarting Entresto but had the swelling of his hands as a result.  He stopped it and the swelling has decreased.  He seems to be truly allergic to sacubitril.  ICD evaluation reveals no significant arrhythmias and excellent parameters with normal function.  I have reviewed the actual image of the ECG tracing obtained today and it shows NSR with RBBB, occasional  PVCs.  He now walks 0.8 to 1.2 miles at a time.  Current Outpatient Medications   Medication Sig    carvediloL (COREG) 12.5 MG tablet Take 2 tablets (25 mg total) by mouth 2 (two) times daily with meals.    coenzyme Q10 100 mg capsule Take 100 mg by mouth once daily.    dietary supplement,misc comb14 24-21 mg Cap Take by mouth.    FARXIGA 10 mg tablet     flaxseed Powd Take by mouth once daily.    LORazepam (ATIVAN) 0.5 MG tablet Take 0.5 mg by mouth 2 (two) times daily.    olmesartan (BENICAR) 40 MG tablet Take 20 mg by mouth once daily.    torsemide (DEMADEX) 20 MG Tab Take 20 mg by mouth once daily.    tumeric-ging-olive-oreg-capryl 100 mg-150 mg- 50 mg-150 mg Cap Take by mouth once daily.    zolpidem (AMBIEN) 10 mg Tab Take 5 mg by mouth nightly as needed.    albuterol (VENTOLIN HFA) 90 mcg/actuation inhaler Inhale 2 puffs into the lungs every 6 (six) hours as needed for Wheezing. Rescue    carboxymethylcellulose (REFRESH PLUS) 0.5 % Dpet 1 drop 3 (three) times daily as needed.    diphenhydrAMINE (BENADRYL) 25 mg capsule Take 25 mg by mouth every 6 (six) hours as needed for Itching (q 6 hours prn).    spironolactone (ALDACTONE) 25 MG tablet Take 1 tablet (25 mg total) by mouth once daily.    zolpidem (AMBIEN) 5 MG Tab      No current facility-administered medications for this visit.       Review of Systems     Constitutional: Reviewed  for decreased appetite, weight gain and weight loss.   HENT: Reviewed for nosebleeds.    Eyes:  Reviewed for blurred vision and visual disturbance.   Cardiovascular: Reviewed for chest pain, claudication, cyanosis,dyspnea on exertion, leg swelling, orthopnea,paroxysmal nocturnal dyspnearregular heartbeats, palpitations, near-syncope, and syncope.   Respiratory: Reviewed for cough, shortness of breath, wheezing, sleep disturbances due to breathing and snoring, .    Endocrine: Reviewed for heat intolerance.   Hematologic/Lymphatic: Reviewed for easy bruisability/bleeding.   Skin:  Reviewed for rash.   Musculoskeletal: Reviewed for muscle weakness and myalgias.   Gastrointestinal: Reviewed for abdominal pain, anorexia, melena, nausea and vomiting.   Genitourinary: Reviewed for hesitancy, frequency, nocturia and incontinence.   Neurological: Reviewed for excessive daytime sleepiness, dizziness, vertigo, weakness, headaches, loss of balance and seizures,   Psychiatric/Behavioral:  Reviewed for insomnia, altered mental status, depression, anxiety and nervousness.       All symptoms reviewed above were negative except for right foot swelling, cough, sputum, urinary frequency, excessive bruising and some tingling.  There also arthritic complaints as well as anxiety/type a personality.       Social History     Tobacco Use   Smoking Status Former   Smokeless Tobacco Never        Objective:     Physical Exam  Vitals and nursing note reviewed.   Constitutional:       Appearance: Normal appearance. He is well-developed.   HENT:      Head: Normocephalic and atraumatic.      Right Ear: External ear normal.      Left Ear: External ear normal.   Eyes:      Conjunctiva/sclera: Conjunctivae normal.      Left eye: Left conjunctiva is not injected. No hemorrhage.     Pupils: Pupils are equal, round, and reactive to light.   Neck:      Thyroid: No thyromegaly.      Vascular: No JVD.   Cardiovascular:      Rate and Rhythm: Normal rate and regular rhythm.      Chest Wall: PMI is not displaced.      Pulses: Intact distal pulses.           Carotid pulses are 2+ on the right side and 2+ on the left side.       Radial pulses are 2+ on the right side and 2+ on the left side.        Dorsalis pedis pulses are 2+ on the right side and 2+ on the left side.        Posterior tibial pulses are 2+ on the right side and 2+ on the left side.      Heart sounds: Normal heart sounds. No midsystolic click and no opening snap. No murmur heard.    No friction rub. No gallop.   Pulmonary:      Effort: Pulmonary effort is normal. No  "respiratory distress.      Breath sounds: Normal breath sounds. No wheezing or rales.   Chest:      Chest wall: No tenderness.      Comments: Device pocket is in excellent repair.  Abdominal:      Palpations: Abdomen is soft. Abdomen is not rigid. There is no hepatomegaly.      Tenderness: There is no abdominal tenderness.   Musculoskeletal:         General: No tenderness. Normal range of motion.      Cervical back: Neck supple.      Right knee: No swelling.      Left knee: No swelling.      Right lower leg: No swelling.      Left lower leg: No swelling.      Right ankle: No swelling.      Left ankle: No swelling.      Right foot: No swelling.      Left foot: No swelling.   Skin:     General: Skin is warm and dry.      Findings: No rash.   Neurological:      Mental Status: He is alert and oriented to person, place, and time.      Cranial Nerves: No cranial nerve deficit.      Coordination: Coordination normal.      Deep Tendon Reflexes: Reflexes are normal and symmetric.   Psychiatric:         Behavior: Behavior normal.     /70   Pulse 68   Ht 5' 9" (1.753 m)   Wt 67.6 kg (149 lb 0.5 oz)   SpO2 98%   BMI 22.01 kg/m²       Results for orders placed during the hospital encounter of 12/19/22    Echo    Interpretation Summary  · The left ventricle is mildly enlarged with severely decreased systolic function.  · The estimated ejection fraction is 15 - 20%.  · Grade II left ventricular diastolic dysfunction.  · Moderate right ventricular enlargement with severely reduced right ventricular systolic function.  · Severe left atrial enlargement.  · Severe right atrial enlargement.  · There is a 23 mm Freire II porcine bioprosthetic aortic valve present. There is trivial central aortic insufficiency present.  · The aortic valve mean gradient is 6 mmHg with a dimensionless index of 0.27.  · Moderate-to-severe mitral regurgitation.  · Moderate to severe tricuspid regurgitation.  · Moderate pulmonic regurgitation.  · " The estimated PA systolic pressure is greater than 54 mmHg.  · There is pulmonary hypertension.    WBC   Date Value Ref Range Status   10/06/2022 5.68 3.90 - 12.70 K/uL Final     Hematocrit   Date Value Ref Range Status   10/06/2022 43.6 40.0 - 54.0 % Final     Hemoglobin   Date Value Ref Range Status   10/06/2022 14.0 14.0 - 18.0 g/dL Final     Lab Results   Component Value Date     (L) 10/06/2022     Lab Results   Component Value Date    CREATININE 1.5 (H) 01/30/2023    EGFRNORACEVR 46.8 (A) 01/30/2023    K 4.3 01/30/2023     Lab Results   Component Value Date    BNP 1,099 (H) 01/30/2023            reports current alcohol use.  Past Medical History:   Diagnosis Date    CHF (congestive heart failure)     Hypertension      Past Surgical History:   Procedure Laterality Date    AORTIC VALVE REPLACEMENT  2016    colon section removal       Family History   Problem Relation Age of Onset    Hypertension Mother     Colon cancer Father        Assessment:    He is allergic to sacubitril.  Will keep him on ARB only.  1. Chronic combined systolic and diastolic congestive heart failure    2. Congestive heart failure, unspecified HF chronicity, unspecified heart failure type    3. S/P AVR (aortic valve replacement)    4. Primary hypertension    5. DIVYA (obstructive sleep apnea)    6. ICD (implantable cardioverter-defibrillator) in place    7. PVC's (premature ventricular contractions)    8. RBBB        Plan:      Orders Placed This Encounter   Procedures    Basic Metabolic Panel     Standing Status:   Future     Standing Expiration Date:   2/27/2024    BNP     Standing Status:   Future     Standing Expiration Date:   4/27/2024       Follow up in about 3 months (around 5/27/2023), or if symptoms worsen or fail to improve, for n may.    Medications Discontinued During This Encounter   Medication Reason    furosemide (LASIX) 20 MG tablet Other - Commment Required    spironolactone (ALDACTONE) 25 MG tablet Reorder        Medications Ordered This Encounter   Medications    spironolactone (ALDACTONE) 25 MG tablet     Sig: Take 1 tablet (25 mg total) by mouth once daily.     Dispense:  90 tablet     Refill:  3     .       Medication List with Changes/Refills   Current Medications    ALBUTEROL (VENTOLIN HFA) 90 MCG/ACTUATION INHALER    Inhale 2 puffs into the lungs every 6 (six) hours as needed for Wheezing. Rescue    CARBOXYMETHYLCELLULOSE (REFRESH PLUS) 0.5 % DPET    1 drop 3 (three) times daily as needed.    CARVEDILOL (COREG) 12.5 MG TABLET    Take 2 tablets (25 mg total) by mouth 2 (two) times daily with meals.    COENZYME Q10 100 MG CAPSULE    Take 100 mg by mouth once daily.    DIETARY SUPPLEMENT,MISC COMB14 24-21 MG CAP    Take by mouth.    DIPHENHYDRAMINE (BENADRYL) 25 MG CAPSULE    Take 25 mg by mouth every 6 (six) hours as needed for Itching (q 6 hours prn).    FARXIGA 10 MG TABLET        FLAXSEED POWD    Take by mouth once daily.    LORAZEPAM (ATIVAN) 0.5 MG TABLET    Take 0.5 mg by mouth 2 (two) times daily.    OLMESARTAN (BENICAR) 40 MG TABLET    Take 20 mg by mouth once daily.    TORSEMIDE (DEMADEX) 20 MG TAB    Take 20 mg by mouth once daily.    TUMERIC-GING-OLIVE-OREG-CAPRYL 100 MG-150 MG- 50 MG-150 MG CAP    Take by mouth once daily.    ZOLPIDEM (AMBIEN) 10 MG TAB    Take 5 mg by mouth nightly as needed.    ZOLPIDEM (AMBIEN) 5 MG TAB       Changed and/or Refilled Medications    Modified Medication Previous Medication    SPIRONOLACTONE (ALDACTONE) 25 MG TABLET spironolactone (ALDACTONE) 25 MG tablet       Take 1 tablet (25 mg total) by mouth once daily.    Take 25 mg by mouth once daily.   Discontinued Medications    FUROSEMIDE (LASIX) 20 MG TABLET    Take 20 mg by mouth.       This note is at least partially dictated using the M*Modal Fluency Direct word recognition program. There are word recognition mistakes that are occasionally missed on review.

## 2023-03-09 ENCOUNTER — HOSPITAL ENCOUNTER (INPATIENT)
Facility: HOSPITAL | Age: 83
LOS: 1 days | Discharge: HOME | DRG: 389 | End: 2023-03-10
Attending: EMERGENCY MEDICINE | Admitting: INTERNAL MEDICINE
Payer: MEDICARE

## 2023-03-09 DIAGNOSIS — K56.609 SBO (SMALL BOWEL OBSTRUCTION): ICD-10-CM

## 2023-03-09 DIAGNOSIS — Z01.818 PRE-OPERATIVE CLEARANCE: ICD-10-CM

## 2023-03-09 DIAGNOSIS — K56.609 SMALL BOWEL OBSTRUCTION: Primary | ICD-10-CM

## 2023-03-09 DIAGNOSIS — I50.42 CHRONIC COMBINED SYSTOLIC AND DIASTOLIC CONGESTIVE HEART FAILURE: ICD-10-CM

## 2023-03-09 DIAGNOSIS — R07.9 CHEST PAIN: ICD-10-CM

## 2023-03-09 DIAGNOSIS — Z01.810 PREOP CARDIOVASCULAR EXAM: ICD-10-CM

## 2023-03-09 PROBLEM — N18.30 CKD (CHRONIC KIDNEY DISEASE), STAGE III: Status: ACTIVE | Noted: 2023-03-09

## 2023-03-09 PROBLEM — E87.5 HYPERKALEMIA: Status: ACTIVE | Noted: 2023-03-09

## 2023-03-09 PROBLEM — E83.52 HYPERCALCEMIA: Status: ACTIVE | Noted: 2023-03-09

## 2023-03-09 PROBLEM — F41.9 ANXIETY: Status: ACTIVE | Noted: 2023-03-09

## 2023-03-09 LAB
ALBUMIN SERPL BCP-MCNC: 2.9 G/DL (ref 3.5–5.2)
ALBUMIN SERPL BCP-MCNC: 3.9 G/DL (ref 3.5–5.2)
ALP SERPL-CCNC: 113 U/L (ref 55–135)
ALP SERPL-CCNC: 147 U/L (ref 55–135)
ALT SERPL W/O P-5'-P-CCNC: 15 U/L (ref 10–44)
ALT SERPL W/O P-5'-P-CCNC: 18 U/L (ref 10–44)
ANION GAP SERPL CALC-SCNC: 13 MMOL/L (ref 8–16)
ANION GAP SERPL CALC-SCNC: 15 MMOL/L (ref 8–16)
AST SERPL-CCNC: 19 U/L (ref 10–40)
AST SERPL-CCNC: 23 U/L (ref 10–40)
BACTERIA #/AREA URNS HPF: NORMAL /HPF
BASOPHILS # BLD AUTO: 0.02 K/UL (ref 0–0.2)
BASOPHILS # BLD AUTO: 0.02 K/UL (ref 0–0.2)
BASOPHILS NFR BLD: 0.2 % (ref 0–1.9)
BASOPHILS NFR BLD: 0.3 % (ref 0–1.9)
BILIRUB SERPL-MCNC: 0.9 MG/DL (ref 0.1–1)
BILIRUB SERPL-MCNC: 1.2 MG/DL (ref 0.1–1)
BILIRUB UR QL STRIP: NEGATIVE
BNP SERPL-MCNC: 3010 PG/ML (ref 0–99)
BUN SERPL-MCNC: 20 MG/DL (ref 8–23)
BUN SERPL-MCNC: 20 MG/DL (ref 8–23)
CALCIUM SERPL-MCNC: 10.8 MG/DL (ref 8.7–10.5)
CALCIUM SERPL-MCNC: 9.4 MG/DL (ref 8.7–10.5)
CHLORIDE SERPL-SCNC: 101 MMOL/L (ref 95–110)
CHLORIDE SERPL-SCNC: 97 MMOL/L (ref 95–110)
CLARITY UR: CLEAR
CO2 SERPL-SCNC: 20 MMOL/L (ref 23–29)
CO2 SERPL-SCNC: 21 MMOL/L (ref 23–29)
COLOR UR: YELLOW
CREAT SERPL-MCNC: 1.1 MG/DL (ref 0.5–1.4)
CREAT SERPL-MCNC: 1.3 MG/DL (ref 0.5–1.4)
DIFFERENTIAL METHOD: ABNORMAL
DIFFERENTIAL METHOD: ABNORMAL
EOSINOPHIL # BLD AUTO: 0 K/UL (ref 0–0.5)
EOSINOPHIL # BLD AUTO: 0 K/UL (ref 0–0.5)
EOSINOPHIL NFR BLD: 0.1 % (ref 0–8)
EOSINOPHIL NFR BLD: 0.4 % (ref 0–8)
ERYTHROCYTE [DISTWIDTH] IN BLOOD BY AUTOMATED COUNT: 13.5 % (ref 11.5–14.5)
ERYTHROCYTE [DISTWIDTH] IN BLOOD BY AUTOMATED COUNT: 13.7 % (ref 11.5–14.5)
EST. GFR  (NO RACE VARIABLE): 56 ML/MIN/1.73 M^2
EST. GFR  (NO RACE VARIABLE): >60 ML/MIN/1.73 M^2
ESTIMATED AVG GLUCOSE: 123 MG/DL (ref 68–131)
GLUCOSE SERPL-MCNC: 125 MG/DL (ref 70–110)
GLUCOSE SERPL-MCNC: 98 MG/DL (ref 70–110)
GLUCOSE UR QL STRIP: ABNORMAL
HBA1C MFR BLD: 5.9 % (ref 4–5.6)
HCT VFR BLD AUTO: 37.5 % (ref 40–54)
HCT VFR BLD AUTO: 43.8 % (ref 40–54)
HGB BLD-MCNC: 12.9 G/DL (ref 14–18)
HGB BLD-MCNC: 14.9 G/DL (ref 14–18)
HGB UR QL STRIP: NEGATIVE
IMM GRANULOCYTES # BLD AUTO: 0.01 K/UL (ref 0–0.04)
IMM GRANULOCYTES # BLD AUTO: 0.02 K/UL (ref 0–0.04)
IMM GRANULOCYTES NFR BLD AUTO: 0.1 % (ref 0–0.5)
IMM GRANULOCYTES NFR BLD AUTO: 0.2 % (ref 0–0.5)
KETONES UR QL STRIP: ABNORMAL
LEUKOCYTE ESTERASE UR QL STRIP: NEGATIVE
LIPASE SERPL-CCNC: 21 U/L (ref 4–60)
LYMPHOCYTES # BLD AUTO: 0.9 K/UL (ref 1–4.8)
LYMPHOCYTES # BLD AUTO: 0.9 K/UL (ref 1–4.8)
LYMPHOCYTES NFR BLD: 11.1 % (ref 18–48)
LYMPHOCYTES NFR BLD: 9.5 % (ref 18–48)
MAGNESIUM SERPL-MCNC: 2 MG/DL (ref 1.6–2.6)
MCH RBC QN AUTO: 31.6 PG (ref 27–31)
MCH RBC QN AUTO: 31.8 PG (ref 27–31)
MCHC RBC AUTO-ENTMCNC: 34 G/DL (ref 32–36)
MCHC RBC AUTO-ENTMCNC: 34.4 G/DL (ref 32–36)
MCV RBC AUTO: 92 FL (ref 82–98)
MCV RBC AUTO: 93 FL (ref 82–98)
MICROSCOPIC COMMENT: NORMAL
MONOCYTES # BLD AUTO: 0.6 K/UL (ref 0.3–1)
MONOCYTES # BLD AUTO: 0.8 K/UL (ref 0.3–1)
MONOCYTES NFR BLD: 7.4 % (ref 4–15)
MONOCYTES NFR BLD: 7.9 % (ref 4–15)
NEUTROPHILS # BLD AUTO: 6.4 K/UL (ref 1.8–7.7)
NEUTROPHILS # BLD AUTO: 7.9 K/UL (ref 1.8–7.7)
NEUTROPHILS NFR BLD: 81 % (ref 38–73)
NEUTROPHILS NFR BLD: 81.8 % (ref 38–73)
NITRITE UR QL STRIP: NEGATIVE
NRBC BLD-RTO: 0 /100 WBC
NRBC BLD-RTO: 0 /100 WBC
PH UR STRIP: 6 [PH] (ref 5–8)
PHOSPHATE SERPL-MCNC: 3.8 MG/DL (ref 2.7–4.5)
PLATELET # BLD AUTO: 211 K/UL (ref 150–450)
PLATELET # BLD AUTO: 231 K/UL (ref 150–450)
PMV BLD AUTO: 10.3 FL (ref 9.2–12.9)
PMV BLD AUTO: 10.7 FL (ref 9.2–12.9)
POTASSIUM SERPL-SCNC: 4.9 MMOL/L (ref 3.5–5.1)
POTASSIUM SERPL-SCNC: 5.5 MMOL/L (ref 3.5–5.1)
PROT SERPL-MCNC: 7.5 G/DL (ref 6–8.4)
PROT SERPL-MCNC: 9.7 G/DL (ref 6–8.4)
PROT UR QL STRIP: ABNORMAL
RBC # BLD AUTO: 4.06 M/UL (ref 4.6–6.2)
RBC # BLD AUTO: 4.71 M/UL (ref 4.6–6.2)
SARS-COV-2 RDRP RESP QL NAA+PROBE: NEGATIVE
SODIUM SERPL-SCNC: 133 MMOL/L (ref 136–145)
SODIUM SERPL-SCNC: 134 MMOL/L (ref 136–145)
SP GR UR STRIP: 1.02 (ref 1–1.03)
URN SPEC COLLECT METH UR: ABNORMAL
UROBILINOGEN UR STRIP-ACNC: NEGATIVE EU/DL
WBC # BLD AUTO: 7.95 K/UL (ref 3.9–12.7)
WBC # BLD AUTO: 9.69 K/UL (ref 3.9–12.7)
YEAST URNS QL MICRO: NORMAL

## 2023-03-09 PROCEDURE — 83690 ASSAY OF LIPASE: CPT | Performed by: EMERGENCY MEDICINE

## 2023-03-09 PROCEDURE — 99223 1ST HOSP IP/OBS HIGH 75: CPT | Mod: ,,, | Performed by: SURGERY

## 2023-03-09 PROCEDURE — 99223 PR INITIAL HOSPITAL CARE,LEVL III: ICD-10-PCS | Mod: ,,, | Performed by: SURGERY

## 2023-03-09 PROCEDURE — 84100 ASSAY OF PHOSPHORUS: CPT | Performed by: NURSE PRACTITIONER

## 2023-03-09 PROCEDURE — 83880 ASSAY OF NATRIURETIC PEPTIDE: CPT | Performed by: NURSE PRACTITIONER

## 2023-03-09 PROCEDURE — 99285 EMERGENCY DEPT VISIT HI MDM: CPT | Mod: 25

## 2023-03-09 PROCEDURE — 99223 PR INITIAL HOSPITAL CARE,LEVL III: ICD-10-PCS | Mod: ,,, | Performed by: INTERNAL MEDICINE

## 2023-03-09 PROCEDURE — 80053 COMPREHEN METABOLIC PANEL: CPT | Performed by: NURSE PRACTITIONER

## 2023-03-09 PROCEDURE — 11000001 HC ACUTE MED/SURG PRIVATE ROOM

## 2023-03-09 PROCEDURE — 93010 EKG 12-LEAD: ICD-10-PCS | Mod: ,,, | Performed by: INTERNAL MEDICINE

## 2023-03-09 PROCEDURE — 25000003 PHARM REV CODE 250: Performed by: EMERGENCY MEDICINE

## 2023-03-09 PROCEDURE — 93010 ELECTROCARDIOGRAM REPORT: CPT | Mod: ,,, | Performed by: INTERNAL MEDICINE

## 2023-03-09 PROCEDURE — 85025 COMPLETE CBC W/AUTO DIFF WBC: CPT | Performed by: NURSE PRACTITIONER

## 2023-03-09 PROCEDURE — 25500020 PHARM REV CODE 255: Performed by: INTERNAL MEDICINE

## 2023-03-09 PROCEDURE — 83735 ASSAY OF MAGNESIUM: CPT | Performed by: NURSE PRACTITIONER

## 2023-03-09 PROCEDURE — 93005 ELECTROCARDIOGRAM TRACING: CPT

## 2023-03-09 PROCEDURE — U0002 COVID-19 LAB TEST NON-CDC: HCPCS | Performed by: EMERGENCY MEDICINE

## 2023-03-09 PROCEDURE — 63600175 PHARM REV CODE 636 W HCPCS: Performed by: NURSE PRACTITIONER

## 2023-03-09 PROCEDURE — 25000003 PHARM REV CODE 250: Performed by: NURSE PRACTITIONER

## 2023-03-09 PROCEDURE — 83036 HEMOGLOBIN GLYCOSYLATED A1C: CPT | Performed by: NURSE PRACTITIONER

## 2023-03-09 PROCEDURE — 80053 COMPREHEN METABOLIC PANEL: CPT | Mod: 91 | Performed by: EMERGENCY MEDICINE

## 2023-03-09 PROCEDURE — 99223 1ST HOSP IP/OBS HIGH 75: CPT | Mod: ,,, | Performed by: INTERNAL MEDICINE

## 2023-03-09 PROCEDURE — 85025 COMPLETE CBC W/AUTO DIFF WBC: CPT | Mod: 91 | Performed by: EMERGENCY MEDICINE

## 2023-03-09 PROCEDURE — 81000 URINALYSIS NONAUTO W/SCOPE: CPT | Performed by: EMERGENCY MEDICINE

## 2023-03-09 RX ORDER — IBUPROFEN 200 MG
16 TABLET ORAL
Status: DISCONTINUED | OUTPATIENT
Start: 2023-03-09 | End: 2023-03-10 | Stop reason: HOSPADM

## 2023-03-09 RX ORDER — ONDANSETRON 2 MG/ML
4 INJECTION INTRAMUSCULAR; INTRAVENOUS EVERY 8 HOURS PRN
Status: DISCONTINUED | OUTPATIENT
Start: 2023-03-09 | End: 2023-03-10 | Stop reason: HOSPADM

## 2023-03-09 RX ORDER — MAG HYDROX/ALUMINUM HYD/SIMETH 200-200-20
30 SUSPENSION, ORAL (FINAL DOSE FORM) ORAL ONCE
Status: COMPLETED | OUTPATIENT
Start: 2023-03-09 | End: 2023-03-09

## 2023-03-09 RX ORDER — SODIUM CHLORIDE 0.9 % (FLUSH) 0.9 %
10 SYRINGE (ML) INJECTION EVERY 12 HOURS PRN
Status: DISCONTINUED | OUTPATIENT
Start: 2023-03-09 | End: 2023-03-10 | Stop reason: HOSPADM

## 2023-03-09 RX ORDER — LORAZEPAM 0.5 MG/1
1 TABLET ORAL 2 TIMES DAILY
Status: DISCONTINUED | OUTPATIENT
Start: 2023-03-09 | End: 2023-03-09

## 2023-03-09 RX ORDER — MORPHINE SULFATE 4 MG/ML
2 INJECTION, SOLUTION INTRAMUSCULAR; INTRAVENOUS EVERY 4 HOURS PRN
Status: DISCONTINUED | OUTPATIENT
Start: 2023-03-09 | End: 2023-03-10 | Stop reason: HOSPADM

## 2023-03-09 RX ORDER — PROCHLORPERAZINE EDISYLATE 5 MG/ML
5 INJECTION INTRAMUSCULAR; INTRAVENOUS EVERY 6 HOURS PRN
Status: DISCONTINUED | OUTPATIENT
Start: 2023-03-09 | End: 2023-03-10 | Stop reason: HOSPADM

## 2023-03-09 RX ORDER — ZOLPIDEM TARTRATE 5 MG/1
5 TABLET ORAL NIGHTLY PRN
Status: DISCONTINUED | OUTPATIENT
Start: 2023-03-09 | End: 2023-03-10 | Stop reason: HOSPADM

## 2023-03-09 RX ORDER — LIDOCAINE HYDROCHLORIDE 20 MG/ML
15 SOLUTION OROPHARYNGEAL ONCE
Status: COMPLETED | OUTPATIENT
Start: 2023-03-09 | End: 2023-03-09

## 2023-03-09 RX ORDER — LORAZEPAM 0.5 MG/1
0.5 TABLET ORAL EVERY 12 HOURS PRN
Status: DISCONTINUED | OUTPATIENT
Start: 2023-03-09 | End: 2023-03-10 | Stop reason: HOSPADM

## 2023-03-09 RX ORDER — CARVEDILOL 25 MG/1
25 TABLET ORAL DAILY
COMMUNITY
End: 2023-03-09

## 2023-03-09 RX ORDER — IBUPROFEN 200 MG
24 TABLET ORAL
Status: DISCONTINUED | OUTPATIENT
Start: 2023-03-09 | End: 2023-03-10 | Stop reason: HOSPADM

## 2023-03-09 RX ORDER — FAMOTIDINE 20 MG/1
20 TABLET, FILM COATED ORAL
Status: COMPLETED | OUTPATIENT
Start: 2023-03-09 | End: 2023-03-09

## 2023-03-09 RX ORDER — ENOXAPARIN SODIUM 100 MG/ML
40 INJECTION SUBCUTANEOUS EVERY 24 HOURS
Status: DISCONTINUED | OUTPATIENT
Start: 2023-03-10 | End: 2023-03-10 | Stop reason: HOSPADM

## 2023-03-09 RX ORDER — LORAZEPAM 2 MG/ML
0.5 INJECTION INTRAMUSCULAR ONCE
Status: COMPLETED | OUTPATIENT
Start: 2023-03-09 | End: 2023-03-09

## 2023-03-09 RX ORDER — SODIUM CHLORIDE 9 MG/ML
INJECTION, SOLUTION INTRAVENOUS CONTINUOUS
Status: DISCONTINUED | OUTPATIENT
Start: 2023-03-09 | End: 2023-03-10 | Stop reason: HOSPADM

## 2023-03-09 RX ORDER — LORAZEPAM 2 MG/ML
0.5 INJECTION INTRAMUSCULAR 2 TIMES DAILY PRN
Status: DISCONTINUED | OUTPATIENT
Start: 2023-03-09 | End: 2023-03-09

## 2023-03-09 RX ORDER — HYDRALAZINE HYDROCHLORIDE 20 MG/ML
10 INJECTION INTRAMUSCULAR; INTRAVENOUS EVERY 4 HOURS PRN
Status: DISCONTINUED | OUTPATIENT
Start: 2023-03-09 | End: 2023-03-10 | Stop reason: HOSPADM

## 2023-03-09 RX ORDER — NALOXONE HCL 0.4 MG/ML
0.02 VIAL (ML) INJECTION
Status: DISCONTINUED | OUTPATIENT
Start: 2023-03-09 | End: 2023-03-10 | Stop reason: HOSPADM

## 2023-03-09 RX ORDER — GLUCAGON 1 MG
1 KIT INJECTION
Status: DISCONTINUED | OUTPATIENT
Start: 2023-03-09 | End: 2023-03-10 | Stop reason: HOSPADM

## 2023-03-09 RX ORDER — COLCHICINE 0.6 MG/1
0.6 TABLET ORAL DAILY
COMMUNITY
Start: 2023-02-22 | End: 2023-04-17

## 2023-03-09 RX ADMIN — DIATRIZOATE MEGLUMINE AND DIATRIZOATE SODIUM 240 ML: 660; 100 LIQUID ORAL; RECTAL at 01:03

## 2023-03-09 RX ADMIN — LORAZEPAM 0.5 MG: 2 INJECTION INTRAMUSCULAR; INTRAVENOUS at 05:03

## 2023-03-09 RX ADMIN — FAMOTIDINE 20 MG: 20 TABLET ORAL at 01:03

## 2023-03-09 RX ADMIN — SODIUM CHLORIDE: 9 INJECTION, SOLUTION INTRAVENOUS at 04:03

## 2023-03-09 RX ADMIN — LIDOCAINE HYDROCHLORIDE 15 ML: 20 SOLUTION ORAL; TOPICAL at 01:03

## 2023-03-09 RX ADMIN — ALUMINUM HYDROXIDE, MAGNESIUM HYDROXIDE, AND DIMETHICONE 30 ML: 200; 20; 200 SUSPENSION ORAL at 01:03

## 2023-03-09 RX ADMIN — SODIUM CHLORIDE: 9 INJECTION, SOLUTION INTRAVENOUS at 05:03

## 2023-03-09 NOTE — ASSESSMENT & PLAN NOTE
Cardiology consulted for preoperative clearance for SBO  Pt is high risk for john and post op cardiac complications.  All risks explained to patient, all questions answered.  Resume all heart failure medications once cleared from surgery.

## 2023-03-09 NOTE — ASSESSMENT & PLAN NOTE
Cardiology consultation   Repeat echocardiogram.  If he continues to have a severely depressed ejection fraction in the 15-20% range I would only recommend surgery if it is necessary for a life-saving procedure      Echo    Interpretation Summary  · The left ventricle is mildly enlarged with severely decreased systolic function.  · The estimated ejection fraction is 15 - 20%.  · Grade II left ventricular diastolic dysfunction.  · Moderate right ventricular enlargement with severely reduced right ventricular systolic function.  · Severe left atrial enlargement.  · Severe right atrial enlargement.  · There is a 23 mm Freire II porcine bioprosthetic aortic valve present. There is trivial central aortic insufficiency present.  · The aortic valve mean gradient is 6 mmHg with a dimensionless index of 0.27.  · Moderate-to-severe mitral regurgitation.  · Moderate to severe tricuspid regurgitation.  · Moderate pulmonic regurgitation.  · The estimated PA systolic pressure is greater than 54 mmHg.  · There is pulmonary hypertension.  .  BNP 3000

## 2023-03-09 NOTE — HPI
Mr. Chu is a 80y/o AA male with PMHx of combined systolic and diastolic HF, HTN, AVR, DIVYA, PAF, RBBB, ICD, CKD, colon cancer who presented to [facility] ED c/o abdominal pain progressively worsening since yesterday. Cardiology consulted to pre op clearance. Pt seen and examined today, reports he just had a bm. Denies any CP and SOB reports he walks daily and takes in a vegetarian diet. Labs reviewed, BNP chronically elevated 3010, Echo 12/22' 15-20% severe MR TR, EKG reviewed.  CT abd showed normal gallbladder, trace ascites, SBO, fluid filled stomach, wide based left anterior abdominal wall hernia filled with a loop of dilated small bowel although this does not appear to be causing obstruction.  There is a left inguinal hernia filled with a loop of colon, which also does not appear to represent a source of obstruction.

## 2023-03-09 NOTE — CONSULTS
[facility] - Emergency Dept.  General Surgery  Consult Note    Patient Name: Edmund Chu  MRN: [id number]  Code Status: Full Code  Admission Date: 3/9/2023  Hospital Length of Stay: 0 days  Attending Physician: Evelyne Wadsworth MD  Primary Care Provider: Srini Scott MD    Patient information was obtained from patient, past medical records and ER records.     Consults  Subjective:     Principal Problem: SBO (small bowel obstruction)    History of Present Illness: Patient presented to the emergency room yesterday with abdominal pain and nausea vomiting.  The abdominal pain was in the epigastrium region and occurred after taking a Nexium for heartburn.  The patient also noted that his longstanding ventral hernia was somewhat more protuberant.  He also states that the hernia tends to move and that it occasion feels barrera below the hernia.  He is always been able to push the hernia back in but it was a little firmer yesterday.      Patient reports having a bowel movement yesterday    Patie and a half a day but rather slowly    He was evaluated in the emergency room and surgery was asked to see the patient in consultation      No current facility-administered medications on file prior to encounter.     Current Outpatient Medications on File Prior to Encounter   Medication Sig    carvediloL (COREG) 12.5 MG tablet Take 2 tablets (25 mg total) by mouth 2 (two) times daily with meals.    coenzyme Q10 100 mg capsule Take 100 mg by mouth once daily.    dietary supplement,misc comb14 24-21 mg Cap Take by mouth.    FARXIGA 10 mg tablet     flaxseed Powd Take by mouth once daily.    LORazepam (ATIVAN) 0.5 MG tablet Take 1 mg by mouth 2 (two) times daily.    olmesartan (BENICAR) 40 MG tablet Take 20 mg by mouth once daily.    spironolactone (ALDACTONE) 25 MG tablet Take 1 tablet (25 mg total) by mouth once daily.    torsemide (DEMADEX) 20 MG Tab Take 20 mg by mouth once daily.    tumeric-ging-olive-oreg-capryl 100  mg-150 mg- 50 mg-150 mg Cap Take by mouth once daily.    zolpidem (AMBIEN) 10 mg Tab Take 5 mg by mouth nightly as needed.    [DISCONTINUED] albuterol (VENTOLIN HFA) 90 mcg/actuation inhaler Inhale 2 puffs into the lungs every 6 (six) hours as needed for Wheezing. Rescue    [DISCONTINUED] carboxymethylcellulose (REFRESH PLUS) 0.5 % Dpet 1 drop 3 (three) times daily as needed.    [DISCONTINUED] diphenhydrAMINE (BENADRYL) 25 mg capsule Take 25 mg by mouth every 6 (six) hours as needed for Itching (q 6 hours prn).    [DISCONTINUED] zolpidem (AMBIEN) 5 MG Tab        Review of patient's allergies indicates:   Allergen Reactions    Sacubitril-valsartan Hives and Swelling    Amlodipine Edema     Ankle    Lactose Other (See Comments)     GI distress    Lisinopril Other (See Comments)    Mefloquine Hives and Itching     Anti malarial         Past Medical History:   Diagnosis Date    Aortic valve prosthesis present     CHF (congestive heart failure)     CKD (chronic kidney disease) stage 3, GFR 30-59 ml/min     Colon cancer     Hypertension     PAF (paroxysmal atrial fibrillation)      Past Surgical History:   Procedure Laterality Date    AORTIC VALVE REPLACEMENT  2016    COLON RESECTION       Family History       Problem Relation (Age of Onset)    Colon cancer Father    Hypertension Mother          Tobacco Use    Smoking status: Former    Smokeless tobacco: Never   Substance and Sexual Activity    Alcohol use: Yes    Drug use: Never    Sexual activity: Not on file     Review of Systems   Constitutional: Negative.    HENT: Negative.     Eyes: Negative.    Respiratory: Negative.     Cardiovascular: Negative.    Gastrointestinal:  Positive for abdominal pain and nausea.   Endocrine: Negative.    Genitourinary: Negative.    Musculoskeletal: Negative.    Skin: Negative.    Allergic/Immunologic: Negative.    Neurological: Negative.    Hematological: Negative.    Psychiatric/Behavioral: Negative.      Objective:     Vital Signs (Most Recent):  Temp: 97.2 °F (36.2 °C) (03/09/23 0729)  Pulse: (!) 59 (03/09/23 0703)  Resp: 20 (03/09/23 0703)  BP: (!) 143/78 (03/09/23 0703)  SpO2: 100 % (03/09/23 0703)   Vital Signs (24h Range):  Temp:  [97.2 °F (36.2 °C)-97.5 °F (36.4 °C)] 97.2 °F (36.2 °C)  Pulse:  [58-76] 59  Resp:  [15-20] 20  SpO2:  [99 %-100 %] 100 %  BP: (143-174)/() 143/78     Weight: 67.7 kg (149 lb 2.3 oz)  Body mass index is 22.02 kg/m².    Physical Exam  Vitals reviewed.   Constitutional:       General: He is not in acute distress.     Appearance: He is well-developed.      Comments: Thin   HENT:      Head: Normocephalic and atraumatic.   Eyes:      General: No scleral icterus.     Pupils: Pupils are equal, round, and reactive to light.   Neck:      Thyroid: No thyromegaly.      Vascular: No JVD.      Trachea: No tracheal deviation.   Cardiovascular:      Rate and Rhythm: Normal rate and regular rhythm.      Heart sounds: Normal heart sounds.   Pulmonary:      Effort: Pulmonary effort is normal.      Breath sounds: Normal breath sounds.   Abdominal:      General: Abdomen is flat. Bowel sounds are normal. There is no distension.      Palpations: Abdomen is soft. There is no mass.      Tenderness: There is no abdominal tenderness. There is no guarding or rebound.      Comments: Midline scar reducible ventral hernia just to the left of the scar above the umbilicus.   Musculoskeletal:         General: Normal range of motion.      Cervical back: Normal range of motion and neck supple.      Right lower leg: No edema.      Left lower leg: No edema.   Lymphadenopathy:      Cervical: No cervical adenopathy.   Skin:     General: Skin is warm and dry.      Comments: AICD left chest wall   Neurological:      General: No focal deficit present.      Mental Status: He is alert and oriented to person, place, and time.   Psychiatric:         Mood and Affect: Mood normal.         Behavior: Behavior normal.          Thought Content: Thought content normal.         Judgment: Judgment normal.       Significant Labs:  I have reviewed all pertinent lab results within the past 24 hours.  CBC:   Recent Labs   Lab 03/09/23  0518   WBC 7.95   RBC 4.06*   HGB 12.9*   HCT 37.5*      MCV 92   MCH 31.8*   MCHC 34.4     BMP:   Recent Labs   Lab 03/09/23  0518   GLU 98   *   K 4.9      CO2 20*   BUN 20   CREATININE 1.1   CALCIUM 9.4   MG 2.0     CMP:   Recent Labs   Lab 03/09/23  0518   GLU 98   CALCIUM 9.4   ALBUMIN 2.9*   PROT 7.5   *   K 4.9   CO2 20*      BUN 20   CREATININE 1.1   ALKPHOS 113   ALT 15   AST 19   BILITOT 0.9     BNP was 3000    Significant Diagnostics:  I have reviewed all pertinent imaging results/findings within the past 24 hours.  CT: I have reviewed all pertinent results/findings within the past 24 hours and my personal findings are:       Narrative & Impression  EXAM: CT ABDOMEN PELVIS WITHOUT CONTRAST, multiplanar reconstructions     TECHNIQUE:  Axial images through the abdomen and pelvis were obtained without IV or oral contrast. Sagittal and coronal reconstructions are provided for review.     CLINICAL INDICATION: Abdominal pain.  Bowel obstruction suspected     FINDINGS: No comparison studies are available.     Lung Bases: Inferior middle lobe and lingular scarring or atelectasis.  Posterior right lower lobe dependent atelectasis.  Calcified granulomas are present within the medial right lower lobe.  Lung bases are otherwise clear.  Negative for pleural or pericardial effusions.  The distal esophagus is prominent and fluid-filled. Pacemaker wires are in place.  Cardiac chambers are enlarged.  Median sternotomy wires with prosthetic valve device in place.  Coronary artery calcifications noted.     Abdomen: The gallbladder is contracted.  One of the limbs of the left adrenal gland are thickened, non-masslike.  Calcified granuloma in the spleen. Noncontrasted appearances of the liver,  pancreas, and spleen are otherwise normal. The gallbladder and biliary tree are normal. The adrenal glands are otherwise normal.     The kidneys are without solid or cystic masses, hydronephrosis or renal stones.     There is a trace amount of ascites in the right paracolic gutter.     Negative for definite adenopathy demonstrated.  Vascular calcifications are present without aneurysmal change.     There are numerous dilated and fluid-filled loops of small bowel involving the proximal and mid small bowel, measuring up to 4 cm in diameter.  The stomach is also mildly dilated and fluid-filled.  There are postoperative changes to the small bowel in the right anterior pericolic gutter region.  This may be the transition point.  Distal small bowel is more normal in caliber, and the colon is normal in caliber.  There is a wide based left anterior abdominal wall hernia filled with a loop of dilated small bowel although this does not appear to be causing obstruction.  There is a left inguinal hernia filled with a loop of colon, which also does not appear to represent a source of obstruction.  There are a few scattered clonic diverticula.  Fat filled right inguinal hernia noted.  Negative for free air.  Normal appendix.     Pelvis:  The urinary bladder is normal.     The male pelvic organs are normal.  Numerous pelvic phleboliths noted.     There are postoperative changes to the anterior abdominal wall, with changes suggesting of an abdominal wall mesh with calcifications on the mesh.  The left anterior abdominal wall hernia appears to be superior to this mesh.  Abdominal wall is otherwise intact.     Negative for osseous abnormalities.  Negative for significant spinal canal stenosis. Disc height reduction with endplate sclerosis, marginal spondylosis and vacuum disc phenomenon involves nearly all of the visualized lower thoracic and lumbar disks, sparing the T12/L1 disc.  Multilevel degenerative facet arthropathy noted as  well.  Ankylosis of the right sacroiliac joint.  Moderate degenerative changes of the pubic symphysis.     Negative for groin adenopathy.        Impression:     1.  Small bowel obstruction with numerous fluid-filled dilated proximal and mid small bowel loops noted, measuring up to 4 cm in diameter.  The stomach and duodenum are also fluid-filled and prominent.  There are postoperative changes in the loop of small bowel within the right anterior abdomen.  The small bowel on both sides of this postoperative change or fluid-filled although not definitely dilated.  There is a left anterior abdominal wall hernia filled with dilated small bowel although this does not appear to be causing obstruction. The exact transition point is difficult to visualize and may be in and amongst the loops of bowel abutting the anterior abdominal wall mesh.  There is a mild amount of ascites, most likely related to the small bowel obstruction.  Negative for free air to suggest perforation.  2.  There is a left inguinal hernia filled with a loop of nondilated colon.  This does not appear to be causing obstruction of the colon, which is normal in caliber, although this hernia may be incarcerated.  Clinical correlation is advised.  3.  Negative for acute process otherwise.  Normal appendix.  Negative for renal stone disease or hydronephrosis.  4.  Middle lobe and lingular scarring or atelectasis.  Dependent atelectasis in the lung bases.  Negative for focal alveolar infiltrate.  5.  Cardiac chamber enlargement with pacemaker in place.  Prosthetic valve device in place.  Coronary artery calcifications.  Evidence for granulomatous disease.  Degenerative changes of the spine and pelvis.  Other nonemergent findings as described above.        All CT scans at [this location] are performed using dose modulation techniques as appropriate to a performed exam including the following: automated exposure control; adjustment of the mA and/or kV according  to patient size (this includes techniques or standardized protocols for targeted exams where dose is matched to indication / reason for exam; i.e. extremities or head); use of iterative reconstruction technique.     Finalized on: 3/9/2023 3:25 AM By:  Néstor Jerez MD  BR[facility]# [id number]      2023-03-09 03:28:[id number]        Assessment/Plan:     * SBO (small bowel obstruction)  Patient may have a small-bowel obstruction.  I would recommend a Gastrografin small-bowel follow-through and follow-up x-rays tomorrow.  Depending on the findings operative intervention may be required.      The patient would be at increased risk for cardiac complications due to his congestive heart failure    Chronic combined systolic and diastolic congestive heart failure  Cardiology consultation   Repeat echocardiogram.  If he continues to have a severely depressed ejection fraction in the 15-20% range I would only recommend surgery if it is necessary for a life-saving procedure      Echo    Interpretation Summary  · The left ventricle is mildly enlarged with severely decreased systolic function.  · The estimated ejection fraction is 15 - 20%.  · Grade II left ventricular diastolic dysfunction.  · Moderate right ventricular enlargement with severely reduced right ventricular systolic function.  · Severe left atrial enlargement.  · Severe right atrial enlargement.  · There is a 23 mm Freire II porcine bioprosthetic aortic valve present. There is trivial central aortic insufficiency present.  · The aortic valve mean gradient is 6 mmHg with a dimensionless index of 0.27.  · Moderate-to-severe mitral regurgitation.  · Moderate to severe tricuspid regurgitation.  · Moderate pulmonic regurgitation.  · The estimated PA systolic pressure is greater than 54 mmHg.  · There is pulmonary hypertension.  .  BNP 3000    Hypertension  Management per Medicine      VTE Risk Mitigation (From admission, onward)         Ordered     enoxaparin injection 40 mg  Daily          03/09/23 0458     IP VTE HIGH RISK PATIENT  Once         03/09/23 0458     Place sequential compression device  Until discontinued         03/09/23 0458                Thank you for your consult. I will follow-up with patient. Please contact us if you have any additional questions.    Andrew Hill MD  General Surgery  [facility] - Emergency Dept.

## 2023-03-09 NOTE — SUBJECTIVE & OBJECTIVE
No current facility-administered medications on file prior to encounter.     Current Outpatient Medications on File Prior to Encounter   Medication Sig    carvediloL (COREG) 12.5 MG tablet Take 2 tablets (25 mg total) by mouth 2 (two) times daily with meals.    coenzyme Q10 100 mg capsule Take 100 mg by mouth once daily.    dietary supplement,misc comb14 24-21 mg Cap Take by mouth.    FARXIGA 10 mg tablet     flaxseed Powd Take by mouth once daily.    LORazepam (ATIVAN) 0.5 MG tablet Take 1 mg by mouth 2 (two) times daily.    olmesartan (BENICAR) 40 MG tablet Take 20 mg by mouth once daily.    spironolactone (ALDACTONE) 25 MG tablet Take 1 tablet (25 mg total) by mouth once daily.    torsemide (DEMADEX) 20 MG Tab Take 20 mg by mouth once daily.    tumeric-ging-olive-oreg-capryl 100 mg-150 mg- 50 mg-150 mg Cap Take by mouth once daily.    zolpidem (AMBIEN) 10 mg Tab Take 5 mg by mouth nightly as needed.    [DISCONTINUED] albuterol (VENTOLIN HFA) 90 mcg/actuation inhaler Inhale 2 puffs into the lungs every 6 (six) hours as needed for Wheezing. Rescue    [DISCONTINUED] carboxymethylcellulose (REFRESH PLUS) 0.5 % Dpet 1 drop 3 (three) times daily as needed.    [DISCONTINUED] diphenhydrAMINE (BENADRYL) 25 mg capsule Take 25 mg by mouth every 6 (six) hours as needed for Itching (q 6 hours prn).    [DISCONTINUED] zolpidem (AMBIEN) 5 MG Tab        Review of patient's allergies indicates:   Allergen Reactions    Sacubitril-valsartan Hives and Swelling    Amlodipine Edema     Ankle    Lactose Other (See Comments)     GI distress    Lisinopril Other (See Comments)    Mefloquine Hives and Itching     Anti malarial         Past Medical History:   Diagnosis Date    Aortic valve prosthesis present     CHF (congestive heart failure)     CKD (chronic kidney disease) stage 3, GFR 30-59 ml/min     Colon cancer     Hypertension     PAF (paroxysmal atrial fibrillation)      Past Surgical History:   Procedure Laterality Date    AORTIC  VALVE REPLACEMENT  2016    COLON RESECTION       Family History       Problem Relation (Age of Onset)    Colon cancer Father    Hypertension Mother          Tobacco Use    Smoking status: Former    Smokeless tobacco: Never   Substance and Sexual Activity    Alcohol use: Yes    Drug use: Never    Sexual activity: Not on file     Review of Systems   Constitutional: Negative.    HENT: Negative.     Eyes: Negative.    Respiratory: Negative.     Cardiovascular: Negative.    Gastrointestinal:  Positive for abdominal pain and nausea.   Endocrine: Negative.    Genitourinary: Negative.    Musculoskeletal: Negative.    Skin: Negative.    Allergic/Immunologic: Negative.    Neurological: Negative.    Hematological: Negative.    Psychiatric/Behavioral: Negative.     Objective:     Vital Signs (Most Recent):  Temp: 97.2 °F (36.2 °C) (03/09/23 0729)  Pulse: (!) 59 (03/09/23 0703)  Resp: 20 (03/09/23 0703)  BP: (!) 143/78 (03/09/23 0703)  SpO2: 100 % (03/09/23 0703)   Vital Signs (24h Range):  Temp:  [97.2 °F (36.2 °C)-97.5 °F (36.4 °C)] 97.2 °F (36.2 °C)  Pulse:  [58-76] 59  Resp:  [15-20] 20  SpO2:  [99 %-100 %] 100 %  BP: (143-174)/() 143/78     Weight: 67.7 kg (149 lb 2.3 oz)  Body mass index is 22.02 kg/m².    Physical Exam  Vitals reviewed.   Constitutional:       General: He is not in acute distress.     Appearance: He is well-developed.      Comments: Thin   HENT:      Head: Normocephalic and atraumatic.   Eyes:      General: No scleral icterus.     Pupils: Pupils are equal, round, and reactive to light.   Neck:      Thyroid: No thyromegaly.      Vascular: No JVD.      Trachea: No tracheal deviation.   Cardiovascular:      Rate and Rhythm: Normal rate and regular rhythm.      Heart sounds: Normal heart sounds.   Pulmonary:      Effort: Pulmonary effort is normal.      Breath sounds: Normal breath sounds.   Abdominal:      General: Abdomen is flat. Bowel sounds are normal. There is no distension.      Palpations:  Abdomen is soft. There is no mass.      Tenderness: There is no abdominal tenderness. There is no guarding or rebound.      Comments: Midline scar reducible ventral hernia just to the left of the scar above the umbilicus.   Musculoskeletal:         General: Normal range of motion.      Cervical back: Normal range of motion and neck supple.      Right lower leg: No edema.      Left lower leg: No edema.   Lymphadenopathy:      Cervical: No cervical adenopathy.   Skin:     General: Skin is warm and dry.      Comments: AICD left chest wall   Neurological:      General: No focal deficit present.      Mental Status: He is alert and oriented to person, place, and time.   Psychiatric:         Mood and Affect: Mood normal.         Behavior: Behavior normal.         Thought Content: Thought content normal.         Judgment: Judgment normal.       Significant Labs:  I have reviewed all pertinent lab results within the past 24 hours.  CBC:   Recent Labs   Lab 03/09/23 0518   WBC 7.95   RBC 4.06*   HGB 12.9*   HCT 37.5*      MCV 92   MCH 31.8*   MCHC 34.4     BMP:   Recent Labs   Lab 03/09/23 0518   GLU 98   *   K 4.9      CO2 20*   BUN 20   CREATININE 1.1   CALCIUM 9.4   MG 2.0     CMP:   Recent Labs   Lab 03/09/23 0518   GLU 98   CALCIUM 9.4   ALBUMIN 2.9*   PROT 7.5   *   K 4.9   CO2 20*      BUN 20   CREATININE 1.1   ALKPHOS 113   ALT 15   AST 19   BILITOT 0.9     BNP was 3000    Significant Diagnostics:  I have reviewed all pertinent imaging results/findings within the past 24 hours.  CT: I have reviewed all pertinent results/findings within the past 24 hours and my personal findings are:       Narrative & Impression  EXAM: CT ABDOMEN PELVIS WITHOUT CONTRAST, multiplanar reconstructions     TECHNIQUE:  Axial images through the abdomen and pelvis were obtained without IV or oral contrast. Sagittal and coronal reconstructions are provided for review.     CLINICAL INDICATION: Abdominal pain.   Bowel obstruction suspected     FINDINGS: No comparison studies are available.     Lung Bases: Inferior middle lobe and lingular scarring or atelectasis.  Posterior right lower lobe dependent atelectasis.  Calcified granulomas are present within the medial right lower lobe.  Lung bases are otherwise clear.  Negative for pleural or pericardial effusions.  The distal esophagus is prominent and fluid-filled. Pacemaker wires are in place.  Cardiac chambers are enlarged.  Median sternotomy wires with prosthetic valve device in place.  Coronary artery calcifications noted.     Abdomen: The gallbladder is contracted.  One of the limbs of the left adrenal gland are thickened, non-masslike.  Calcified granuloma in the spleen. Noncontrasted appearances of the liver, pancreas, and spleen are otherwise normal. The gallbladder and biliary tree are normal. The adrenal glands are otherwise normal.     The kidneys are without solid or cystic masses, hydronephrosis or renal stones.     There is a trace amount of ascites in the right paracolic gutter.     Negative for definite adenopathy demonstrated.  Vascular calcifications are present without aneurysmal change.     There are numerous dilated and fluid-filled loops of small bowel involving the proximal and mid small bowel, measuring up to 4 cm in diameter.  The stomach is also mildly dilated and fluid-filled.  There are postoperative changes to the small bowel in the right anterior pericolic gutter region.  This may be the transition point.  Distal small bowel is more normal in caliber, and the colon is normal in caliber.  There is a wide based left anterior abdominal wall hernia filled with a loop of dilated small bowel although this does not appear to be causing obstruction.  There is a left inguinal hernia filled with a loop of colon, which also does not appear to represent a source of obstruction.  There are a few scattered clonic diverticula.  Fat filled right inguinal hernia  noted.  Negative for free air.  Normal appendix.     Pelvis:  The urinary bladder is normal.     The male pelvic organs are normal.  Numerous pelvic phleboliths noted.     There are postoperative changes to the anterior abdominal wall, with changes suggesting of an abdominal wall mesh with calcifications on the mesh.  The left anterior abdominal wall hernia appears to be superior to this mesh.  Abdominal wall is otherwise intact.     Negative for osseous abnormalities.  Negative for significant spinal canal stenosis. Disc height reduction with endplate sclerosis, marginal spondylosis and vacuum disc phenomenon involves nearly all of the visualized lower thoracic and lumbar disks, sparing the T12/L1 disc.  Multilevel degenerative facet arthropathy noted as well.  Ankylosis of the right sacroiliac joint.  Moderate degenerative changes of the pubic symphysis.     Negative for groin adenopathy.        Impression:     1.  Small bowel obstruction with numerous fluid-filled dilated proximal and mid small bowel loops noted, measuring up to 4 cm in diameter.  The stomach and duodenum are also fluid-filled and prominent.  There are postoperative changes in the loop of small bowel within the right anterior abdomen.  The small bowel on both sides of this postoperative change or fluid-filled although not definitely dilated.  There is a left anterior abdominal wall hernia filled with dilated small bowel although this does not appear to be causing obstruction. The exact transition point is difficult to visualize and may be in and amongst the loops of bowel abutting the anterior abdominal wall mesh.  There is a mild amount of ascites, most likely related to the small bowel obstruction.  Negative for free air to suggest perforation.  2.  There is a left inguinal hernia filled with a loop of nondilated colon.  This does not appear to be causing obstruction of the colon, which is normal in caliber, although this hernia may be  incarcerated.  Clinical correlation is advised.  3.  Negative for acute process otherwise.  Normal appendix.  Negative for renal stone disease or hydronephrosis.  4.  Middle lobe and lingular scarring or atelectasis.  Dependent atelectasis in the lung bases.  Negative for focal alveolar infiltrate.  5.  Cardiac chamber enlargement with pacemaker in place.  Prosthetic valve device in place.  Coronary artery calcifications.  Evidence for granulomatous disease.  Degenerative changes of the spine and pelvis.  Other nonemergent findings as described above.        All CT scans at [this location] are performed using dose modulation techniques as appropriate to a performed exam including the following: automated exposure control; adjustment of the mA and/or kV according to patient size (this includes techniques or standardized protocols for targeted exams where dose is matched to indication / reason for exam; i.e. extremities or head); use of iterative reconstruction technique.     Finalized on: 3/9/2023 3:25 AM By:  Néstor Jerez MD  [facility]# [id number]      2023-03-09 03:28:02.889

## 2023-03-09 NOTE — ASSESSMENT & PLAN NOTE
Patient may have a small-bowel obstruction.  I would recommend a Gastrografin small-bowel follow-through and follow-up x-rays tomorrow.  Depending on the findings operative intervention may be required.      The patient would be at increased risk for cardiac complications due to his congestive heart failure

## 2023-03-09 NOTE — H&P
[facility]  [facility] Medicine  History & Physical    Patient Name: Edmund Chu  MRN: [id number]  Patient Class: IP- Inpatient  Admission Date: 3/9/2023  Attending Physician: Dr. Coleman   Primary Care Provider: Srini Scott MD         Patient information was obtained from patient and ER records.     Subjective:     Principal Problem:SBO (small bowel obstruction)    Chief Complaint:   Chief Complaint   Patient presents with    Abdominal Pain     Abd pain since yesterday after taking Nexium for heartburn. Abd hernia noted. States 1 episode of vomiting.         HPI: The patient is a 81 y.o. 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 Pt then had a vomiting episode on arrival to ED. Pt reports constipation symptoms x 2 weeks requiring his bidet to have a BM-last BM was yesterday after using bidet but very small. No flatus since then. Denies nausea or any further vomiting     In the ED, BP elevated 167/100, labs showed normal WBC, Na 133, K 5.5, Ca 10.8, Alk phos 147, T bili 1.2. UA showed 3+ glucose, +1 ketones   CT abd showed normal gallbladder, trace ascites, SBO, fluid filled stomach, wide based left anterior abdominal wall hernia filled with a loop of dilated small bowel although this does not appear to be causing obstruction.  There is a left inguinal hernia filled with a loop of colon, which also does not appear to represent a source of obstruction.     ED provider discussed car with General Surgery who recommended NPO and cardiology consult    Pt requested Ativan for anxiety          Past Medical History:   Diagnosis Date    Aortic valve prosthesis present     CHF (congestive heart failure)     CKD (chronic kidney disease)  stage 3, GFR 30-59 ml/min     Colon cancer     Hypertension     PAF (paroxysmal atrial fibrillation)        Past Surgical History:   Procedure Laterality Date    AORTIC VALVE REPLACEMENT  2016    COLON RESECTION         Review of patient's allergies indicates:   Allergen Reactions    Sacubitril-valsartan Hives and Swelling    Amlodipine Edema     Ankle    Lactose Other (See Comments)     GI distress    Lisinopril Other (See Comments)    Mefloquine Hives and Itching     Anti malarial         No current facility-administered medications on file prior to encounter.     Current Outpatient Medications on File Prior to Encounter   Medication Sig    carvediloL (COREG) 12.5 MG tablet Take 2 tablets (25 mg total) by mouth 2 (two) times daily with meals.    coenzyme Q10 100 mg capsule Take 100 mg by mouth once daily.    dietary supplement,misc comb14 24-21 mg Cap Take by mouth.    FARXIGA 10 mg tablet     flaxseed Powd Take by mouth once daily.    LORazepam (ATIVAN) 0.5 MG tablet Take 0.5 mg by mouth 2 (two) times daily.    olmesartan (BENICAR) 40 MG tablet Take 20 mg by mouth once daily.    spironolactone (ALDACTONE) 25 MG tablet Take 1 tablet (25 mg total) by mouth once daily.    torsemide (DEMADEX) 20 MG Tab Take 20 mg by mouth once daily.    tumeric-ging-olive-oreg-capryl 100 mg-150 mg- 50 mg-150 mg Cap Take by mouth once daily.    zolpidem (AMBIEN) 10 mg Tab Take 5 mg by mouth nightly as needed.    [DISCONTINUED] albuterol (VENTOLIN HFA) 90 mcg/actuation inhaler Inhale 2 puffs into the lungs every 6 (six) hours as needed for Wheezing. Rescue    [DISCONTINUED] carboxymethylcellulose (REFRESH PLUS) 0.5 % Dpet 1 drop 3 (three) times daily as needed.    [DISCONTINUED] diphenhydrAMINE (BENADRYL) 25 mg capsule Take 25 mg by mouth every 6 (six) hours as needed for Itching (q 6 hours prn).    [DISCONTINUED] zolpidem (AMBIEN) 5 MG Tab      Family History       Problem Relation (Age of Onset)    Colon  cancer Father    Hypertension Mother          Tobacco Use    Smoking status: Former    Smokeless tobacco: Never   Substance and Sexual Activity    Alcohol use: Yes    Drug use: Never    Sexual activity: Not on file     Review of Systems   Constitutional:  Negative for appetite change, chills, diaphoresis, fatigue and fever.   HENT:  Negative for congestion, nosebleeds, sore throat and trouble swallowing.    Eyes:  Negative for pain, discharge and visual disturbance.   Respiratory:  Negative for apnea, cough, chest tightness, shortness of breath, wheezing and stridor.    Cardiovascular:  Negative for chest pain, palpitations and leg swelling.   Gastrointestinal:  Positive for abdominal pain, constipation, nausea and vomiting. Negative for abdominal distention, blood in stool and diarrhea.   Endocrine: Negative for cold intolerance and heat intolerance.   Genitourinary:  Negative for difficulty urinating, dysuria, flank pain, frequency and urgency.   Musculoskeletal:  Negative for arthralgias, back pain, joint swelling, myalgias, neck pain and neck stiffness.   Skin:  Negative for rash and wound.   Allergic/Immunologic: Negative for food allergies and immunocompromised state.   Neurological:  Negative for dizziness, seizures, syncope, facial asymmetry, weakness, light-headedness and headaches.   Hematological:  Negative for adenopathy.   Psychiatric/Behavioral:  Negative for agitation, behavioral problems and confusion. The patient is not nervous/anxious.    Objective:     Vital Signs (Most Recent):  Temp: 97.5 °F (36.4 °C) (03/09/23 0106)  Pulse: 61 (03/09/23 0403)  Resp: 15 (03/09/23 0106)  BP: (!) 174/102 (03/09/23 0403)  SpO2: 100 % (03/09/23 0403)   Vital Signs (24h Range):  Temp:  [97.5 °F (36.4 °C)] 97.5 °F (36.4 °C)  Pulse:  [61-76] 61  Resp:  [15] 15  SpO2:  [99 %-100 %] 100 %  BP: (167-174)/(100-102) 174/102     Weight: 67.7 kg (149 lb 2.3 oz)  Body mass index is 22.02 kg/m².    Physical Exam  Vitals  and nursing note reviewed.   Constitutional:       Appearance: He is well-developed.   HENT:      Head: Normocephalic and atraumatic.      Nose: Nose normal.   Eyes:      General: No scleral icterus.  Cardiovascular:      Rate and Rhythm: Normal rate and regular rhythm.      Heart sounds: Murmur heard.     No friction rub. No gallop.   Pulmonary:      Effort: Pulmonary effort is normal. No respiratory distress.      Breath sounds: Normal breath sounds. No wheezing.   Abdominal:      General: Bowel sounds are normal. There is no distension.      Palpations: Abdomen is soft.      Tenderness: There is abdominal tenderness (generalized left sided TTP).      Comments: Large left ventral hernia- easily reducible    Musculoskeletal:         General: Normal range of motion.      Cervical back: Normal range of motion and neck supple.   Skin:     General: Skin is warm and dry.      Findings: No rash.   Neurological:      Mental Status: He is alert and oriented to person, place, and time.      Cranial Nerves: No cranial nerve deficit.   Psychiatric:         Mood and Affect: Mood is anxious.         Behavior: Behavior normal.           Significant Labs: All pertinent labs within the past 24 hours have been reviewed.    Significant Imaging: I have reviewed all pertinent imaging results/findings within the past 24 hours.    Assessment/Plan:     * SBO (small bowel obstruction)  NPO   IVFs  IV antiemetics prn   IV Morphine prn  Pt counseled that he will likely will need NGT      Anxiety  Pt reports he takes Ativan 1mg po BID as home med   Pt requests to continue Ativan  Will order Ativan 0.5mg BID prn         CKD (chronic kidney disease), stage III  Appears at baseline   Monitor     Hypercalcemia  likely mild dehydration   Gentle IV hydration   monitor      Hyperkalemia  Likely mild dehydration   Gentle IVFs  Monitor closely   Recheck BMP in 4 hours       S/P AVR (aortic valve replacement)  bioprosthetic AVR 2016  Cardiology  consulted       Chronic combined systolic and diastolic congestive heart failure  Patient is identified as having Combined Systolic and Diastolic heart failure that is Chronic. CHF is currently controlled. Latest ECHO performed and demonstrates- Results for orders placed during the hospital encounter of 12/19/22    Echo    Interpretation Summary  · The left ventricle is mildly enlarged with severely decreased systolic function.  · The estimated ejection fraction is 15 - 20%.  · Grade II left ventricular diastolic dysfunction.  · Moderate right ventricular enlargement with severely reduced right ventricular systolic function.  · Severe left atrial enlargement.  · Severe right atrial enlargement.  · There is a 23 mm Freire II porcine bioprosthetic aortic valve present. There is trivial central aortic insufficiency present.  · The aortic valve mean gradient is 6 mmHg with a dimensionless index of 0.27.  · Moderate-to-severe mitral regurgitation.  · Moderate to severe tricuspid regurgitation.  · Moderate pulmonic regurgitation.  · The estimated PA systolic pressure is greater than 54 mmHg.  · There is pulmonary hypertension.  . Monitor clinical status closely. Monitor on telemetry. Patient is off CHF pathway.  Monitor strict Is&Os and daily weights. Continue to stress to patient importance of self efficacy and  on diet for CHF. Last BNP reviewed- and noted below No results for input(s): BNP, BNPTRIAGEBLO in the last 168 hours..    Mildly dehydrated- gentle IVFs     Hypertension  Hold po meds fo now  IV Hydralazine prn         VTE Risk Mitigation (From admission, onward)         Ordered     enoxaparin injection 40 mg  Daily         03/09/23 0458     IP VTE HIGH RISK PATIENT  Once         03/09/23 0458     Place sequential compression device  Until discontinued         03/09/23 0458                   Abigail Marrero NP  Department of Hospital Medicine   '[place] - Emergency Dept.

## 2023-03-09 NOTE — HPI
The patient is a 81 y.o. 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 Pt then had a vomiting episode on arrival to ED. Pt reports constipation symptoms x 2 weeks requiring his bidet to have a BM-last BM was yesterday after using bidet but very small. No flatus since then. Denies nausea or any further vomiting     In the ED, BP elevated 167/100, labs showed normal WBC, Na 133, K 5.5, Ca 10.8, Alk phos 147, T bili 1.2. UA showed 3+ glucose, +1 ketones   CT abd showed normal gallbladder, trace ascites, SBO, fluid filled stomach, wide based left anterior abdominal wall hernia filled with a loop of dilated small bowel although this does not appear to be causing obstruction.  There is a left inguinal hernia filled with a loop of colon, which also does not appear to represent a source of obstruction.     ED provider discussed car with General Surgery who recommended NPO and cardiology consult    Pt requested Ativan for anxiety

## 2023-03-09 NOTE — CONSULTS
[facility] - Emergency Dept.  Cardiology  Consult Note    Patient Name: Edmund Chu  MRN: [id number]  Admission Date: 3/9/2023  Hospital Length of Stay: 0 days  Code Status: Full Code   Attending Provider: Evelyne Wadsworth MD   Consulting Provider: Maribel Galindo NP  Primary Care Physician: Srini Scott MD  Principal Problem:SBO (small bowel obstruction)    Patient information was obtained from patient and ER records.     Inpatient consult to Cardiology  Consult performed by: Maribel Galindo NP  Consult ordered by: Abigail Marrero NP        Subjective:     Chief Complaint:  Abdominal pain     HPI:   Mr. Cuh is a 82y/o AA male with PMHx of combined systolic and diastolic HF, HTN, AVR, DIVYA, PAF, RBBB, ICD, CKD, colon cancer who presented to [facility] ED c/o abdominal pain progressively worsening since yesterday. Cardiology consulted to pre op clearance. Pt seen and examined today, reports he just had a bm. Denies any CP and SOB reports he walks daily and takes in a vegetarian diet. Labs reviewed, BNP chronically elevated 3010, Echo 12/22' 15-20% severe MR TR, EKG reviewed.  CT abd showed normal gallbladder, trace ascites, SBO, fluid filled stomach, wide based left anterior abdominal wall hernia filled with a loop of dilated small bowel although this does not appear to be causing obstruction.  There is a left inguinal hernia filled with a loop of colon, which also does not appear to represent a source of obstruction.      Past Medical History:   Diagnosis Date    Aortic valve prosthesis present     CHF (congestive heart failure)     CKD (chronic kidney disease) stage 3, GFR 30-59 ml/min     Colon cancer     Hypertension     PAF (paroxysmal atrial fibrillation)        Past Surgical History:   Procedure Laterality Date    AORTIC VALVE REPLACEMENT  2016    COLON RESECTION         Review of patient's allergies indicates:   Allergen Reactions    Sacubitril-valsartan Hives and Swelling    Amlodipine  Edema     Ankle    Lactose Other (See Comments)     GI distress    Lisinopril Other (See Comments)    Mefloquine Hives and Itching     Anti malarial         No current facility-administered medications on file prior to encounter.     Current Outpatient Medications on File Prior to Encounter   Medication Sig    carvediloL (COREG) 25 MG tablet Take 25 mg by mouth once daily.    coenzyme Q10 100 mg capsule Take 100 mg by mouth once daily.    colchicine (COLCRYS) 0.6 mg tablet Take 0.6 mg by mouth once daily.    dapagliflozin (FARXIGA) 10 mg tablet Take 10 mg by mouth once daily.    LORazepam (ATIVAN) 0.5 MG tablet Take 1 mg by mouth 2 (two) times daily.    olmesartan (BENICAR) 40 MG tablet Take 20 mg by mouth once daily.    spironolactone (ALDACTONE) 25 MG tablet Take 1 tablet (25 mg total) by mouth once daily.    torsemide (DEMADEX) 20 MG Tab Take 30 mg by mouth once daily.    tumeric-ging-olive-oreg-capryl 100 mg-150 mg- 50 mg-150 mg Cap Take 1 capsule by mouth once daily.    zolpidem (AMBIEN) 10 mg Tab Take 5 mg by mouth nightly as needed.    [DISCONTINUED] albuterol (VENTOLIN HFA) 90 mcg/actuation inhaler Inhale 2 puffs into the lungs every 6 (six) hours as needed for Wheezing. Rescue    [DISCONTINUED] carboxymethylcellulose (REFRESH PLUS) 0.5 % Dpet 1 drop 3 (three) times daily as needed.    [DISCONTINUED] carvediloL (COREG) 12.5 MG tablet Take 2 tablets (25 mg total) by mouth 2 (two) times daily with meals.    [DISCONTINUED] dietary supplement,misc comb14 24-21 mg Cap Take by mouth.    [DISCONTINUED] diphenhydrAMINE (BENADRYL) 25 mg capsule Take 25 mg by mouth every 6 (six) hours as needed for Itching (q 6 hours prn).    [DISCONTINUED] flaxseed Powd Take by mouth once daily.    [DISCONTINUED] zolpidem (AMBIEN) 5 MG Tab      Family History       Problem Relation (Age of Onset)    Colon cancer Father    Hypertension Mother          Tobacco Use    Smoking status: Former    Smokeless tobacco:  Never   Substance and Sexual Activity    Alcohol use: Yes    Drug use: Never    Sexual activity: Not on file     Review of Systems   Constitutional: Negative.   HENT: Negative.     Eyes: Negative.    Cardiovascular: Negative.    Respiratory: Negative.     Skin: Negative.    Musculoskeletal: Negative.    Gastrointestinal:  Positive for bloating, nausea and vomiting.   Genitourinary: Negative.    Neurological: Negative.    Psychiatric/Behavioral: Negative.     Objective:     Vital Signs (Most Recent):  Temp: 97.2 °F (36.2 °C) (03/09/23 0729)  Pulse: (!) 59 (03/09/23 0703)  Resp: 20 (03/09/23 0703)  BP: (!) 149/83 (03/09/23 0801)  SpO2: 100 % (03/09/23 0703)   Vital Signs (24h Range):  Temp:  [97.2 °F (36.2 °C)-97.5 °F (36.4 °C)] 97.2 °F (36.2 °C)  Pulse:  [58-76] 59  Resp:  [15-20] 20  SpO2:  [99 %-100 %] 100 %  BP: (143-174)/() 149/83     Weight: 67.7 kg (149 lb 2.3 oz)  Body mass index is 22.02 kg/m².    SpO2: 100 %       No intake or output data in the 24 hours ending 03/09/23 1106    Lines/Drains/Airways       Peripheral Intravenous Line  Duration                  Peripheral IV - Single Lumen 03/09/23 0130 20 G Anterior;Distal;Left Upper Arm <1 day                    Physical Exam  Vitals and nursing note reviewed.   Constitutional:       Appearance: Normal appearance.   HENT:      Head: Normocephalic and atraumatic.   Eyes:      General:         Right eye: No discharge.         Left eye: No discharge.      Pupils: Pupils are equal, round, and reactive to light.   Cardiovascular:      Rate and Rhythm: Normal rate and regular rhythm.      Heart sounds: S1 normal and S2 normal. Murmur heard.     No friction rub.   Pulmonary:      Effort: Pulmonary effort is normal. No respiratory distress.      Breath sounds: Normal breath sounds. No rales.   Abdominal:      Palpations: Abdomen is soft.      Tenderness: There is no abdominal tenderness.      Hernia: A hernia is present.      Comments: reducible    Musculoskeletal:      Cervical back: Neck supple.      Right lower leg: No edema.      Left lower leg: No edema.   Skin:     General: Skin is warm and dry.   Neurological:      General: No focal deficit present.      Mental Status: He is alert and oriented to person, place, and time.   Psychiatric:         Mood and Affect: Mood normal.         Behavior: Behavior normal.         Thought Content: Thought content normal.       Significant Labs: BMP:   Recent Labs   Lab 03/09/23  0127 03/09/23  0518   * 98   * 134*   K 5.5* 4.9   CL 97 101   CO2 21* 20*   BUN 20 20   CREATININE 1.3 1.1   CALCIUM 10.8* 9.4   MG  --  2.0   , CMP   Recent Labs   Lab 03/09/23  0127 03/09/23 0518   * 134*   K 5.5* 4.9   CL 97 101   CO2 21* 20*   * 98   BUN 20 20   CREATININE 1.3 1.1   CALCIUM 10.8* 9.4   PROT 9.7* 7.5   ALBUMIN 3.9 2.9*   BILITOT 1.2* 0.9   ALKPHOS 147* 113   AST 23 19   ALT 18 15   ANIONGAP 15 13   , CBC   Recent Labs   Lab 03/09/23  0127 03/09/23 0518   WBC 9.69 7.95   HGB 14.9 12.9*   HCT 43.8 37.5*    211   , INR No results for input(s): INR, PROTIME in the last 48 hours., Lipid Panel No results for input(s): CHOL, HDL, LDLCALC, TRIG, CHOLHDL in the last 48 hours., Troponin No results for input(s): TROPONINI in the last 48 hours., and All pertinent lab results from the last 24 hours have been reviewed.    Significant Imaging: Echocardiogram: Transthoracic echo (TTE) complete (Cupid Only):   Results for orders placed or performed during the hospital encounter of 12/19/22   Echo   Result Value Ref Range    BSA 1.89 m2    TDI SEPTAL 0.04 m/s    LV LATERAL E/E' RATIO 8.71 m/s    LV SEPTAL E/E' RATIO 15.25 m/s    LA WIDTH 4.70 cm    Left Ventricular Outflow Tract Mean Velocity 0.30 cm/s    Left Ventricular Outflow Tract Mean Gradient 0.41 mmHg    TDI LATERAL 0.07 m/s    PV PEAK VELOCITY 1.19 cm/s    LVIDd 5.63 3.5 - 6.0 cm    IVS 0.93 0.6 - 1.1 cm    Posterior Wall 0.88 0.6 - 1.1 cm     LVIDs 5.47 (A) 2.1 - 4.0 cm    FS 3 28 - 44 %    LA volume 163.75 cm3    Sinus 2.42 cm    STJ 2.78 cm    LV mass 194.77 g    LA size 5.43 cm    RVDD 4.96 cm    TAPSE 1.12 cm    Left Ventricle Relative Wall Thickness 0.31 cm    AV mean gradient 6 mmHg    AV valve area 0.89 cm2    AV Velocity Ratio 0.26     AV index (prosthetic) 0.27     MV valve area p 1/2 method 5.92 cm2    E/A ratio 1.74     Mean e' 0.06 m/s    E wave deceleration time 128.19 msec    IVRT 72.31 msec    LVOT diameter 2.05 cm    LVOT area 3.3 cm2    LVOT peak eleazar 0.41 m/s    LVOT peak VTI 7.40 cm    Ao peak eleazar 1.57 m/s    Ao VTI 27.4 cm    RVOT peak eleazar 0.38 m/s    RVOT peak VTI 5.9 cm    LVOT stroke volume 24.41 cm3    AV peak gradient 10 mmHg    PV mean gradient 0.23 mmHg    E/E' ratio 11.09 m/s    MV Peak E Eleazar 0.61 m/s    TR Max Eleazar 3.68 m/s    MV stenosis pressure 1/2 time 37.18 ms    MV Peak A Eleazar 0.35 m/s    LV Systolic Volume 145.58 mL    LV Systolic Volume Index 77.0 mL/m2    LV Diastolic Volume 155.74 mL    LV Diastolic Volume Index 82.40 mL/m2    LA Volume Index 86.6 mL/m2    LV Mass Index 103 g/m2    RA Major Axis 7.46 cm    Left Atrium Minor Axis 7.44 cm    Left Atrium Major Axis 7.66 cm    Triscuspid Valve Regurgitation Peak Gradient 54 mmHg    LA Volume Index (Mod) 51.9 mL/m2    LA volume (mod) 98.02 cm3    RA Width 5.99 cm    EF 15 %    Narrative    · The left ventricle is mildly enlarged with severely decreased systolic   function.  · The estimated ejection fraction is 15 - 20%.  · Grade II left ventricular diastolic dysfunction.  · Moderate right ventricular enlargement with severely reduced right   ventricular systolic function.  · Severe left atrial enlargement.  · Severe right atrial enlargement.  · There is a 23 mm Freire II porcine bioprosthetic aortic valve present.   There is trivial central aortic insufficiency present.  · The aortic valve mean gradient is 6 mmHg with a dimensionless index of   0.27.  · Moderate-to-severe  mitral regurgitation.  · Moderate to severe tricuspid regurgitation.  · Moderate pulmonic regurgitation.  · The estimated PA systolic pressure is greater than 54 mmHg.  · There is pulmonary hypertension.      , EKG: reviewed, and X-Ray: CXR: X-Ray Chest 1 View (CXR): No results found for this visit on 03/09/23.    Assessment and Plan:     * SBO (small bowel obstruction)  Sx following    Pre-operative clearance  Cardiology consulted for preoperative clearance for SBO  Pt is high risk for john and post op cardiac complications.  All risks explained to patient, all questions answered.  Resume all heart failure medications once cleared from surgery.     ICD (implantable cardioverter-defibrillator) in place  No shocks reported    Chronic combined systolic and diastolic congestive heart failure  Known combined systolic diastolic HR patient followed by Dr. Johns and [facility]  Echo 12/22' EF 15-20%  BNP chronically elevated 3010  Cont OMT once cleared from sx  Cont Low Na diet  Strict I/Os  Cont daily exercise as tolerated      Hypertension  Cont home medications once cleared from sx        VTE Risk Mitigation (From admission, onward)         Ordered     enoxaparin injection 40 mg  Daily         03/09/23 0458     IP VTE HIGH RISK PATIENT  Once         03/09/23 0458     Place sequential compression device  Until discontinued         03/09/23 0458                Thank you for your consult. I will sign off. Please contact us if you have any additional questions.    Maribel Galindo, NP  Cardiology   [facility] - Emergency Dept.

## 2023-03-09 NOTE — ASSESSMENT & PLAN NOTE
Known combined systolic diastolic HR patient followed by Dr. Johns and [facility]  Echo 12/22' EF 15-20%  BNP chronically elevated 3010  Cont OMT once cleared from sx  Cont Low Na diet  Strict I/Os  Cont daily exercise as tolerated

## 2023-03-09 NOTE — SUBJECTIVE & OBJECTIVE
Past Medical History:   Diagnosis Date    Aortic valve prosthesis present     CHF (congestive heart failure)     CKD (chronic kidney disease) stage 3, GFR 30-59 ml/min     Colon cancer     Hypertension     PAF (paroxysmal atrial fibrillation)        Past Surgical History:   Procedure Laterality Date    AORTIC VALVE REPLACEMENT  2016    COLON RESECTION         Review of patient's allergies indicates:   Allergen Reactions    Sacubitril-valsartan Hives and Swelling    Amlodipine Edema     Ankle    Lactose Other (See Comments)     GI distress    Lisinopril Other (See Comments)    Mefloquine Hives and Itching     Anti malarial         No current facility-administered medications on file prior to encounter.     Current Outpatient Medications on File Prior to Encounter   Medication Sig    carvediloL (COREG) 12.5 MG tablet Take 2 tablets (25 mg total) by mouth 2 (two) times daily with meals.    coenzyme Q10 100 mg capsule Take 100 mg by mouth once daily.    dietary supplement,misc comb14 24-21 mg Cap Take by mouth.    FARXIGA 10 mg tablet     flaxseed Powd Take by mouth once daily.    LORazepam (ATIVAN) 0.5 MG tablet Take 0.5 mg by mouth 2 (two) times daily.    olmesartan (BENICAR) 40 MG tablet Take 20 mg by mouth once daily.    spironolactone (ALDACTONE) 25 MG tablet Take 1 tablet (25 mg total) by mouth once daily.    torsemide (DEMADEX) 20 MG Tab Take 20 mg by mouth once daily.    tumeric-ging-olive-oreg-capryl 100 mg-150 mg- 50 mg-150 mg Cap Take by mouth once daily.    zolpidem (AMBIEN) 10 mg Tab Take 5 mg by mouth nightly as needed.    [DISCONTINUED] albuterol (VENTOLIN HFA) 90 mcg/actuation inhaler Inhale 2 puffs into the lungs every 6 (six) hours as needed for Wheezing. Rescue    [DISCONTINUED] carboxymethylcellulose (REFRESH PLUS) 0.5 % Dpet 1 drop 3 (three) times daily as needed.    [DISCONTINUED] diphenhydrAMINE (BENADRYL) 25 mg capsule Take 25 mg by mouth every 6 (six) hours as needed for Itching (q 6 hours prn).     [DISCONTINUED] zolpidem (AMBIEN) 5 MG Tab      Family History       Problem Relation (Age of Onset)    Colon cancer Father    Hypertension Mother          Tobacco Use    Smoking status: Former    Smokeless tobacco: Never   Substance and Sexual Activity    Alcohol use: Yes    Drug use: Never    Sexual activity: Not on file     Review of Systems   Constitutional:  Negative for appetite change, chills, diaphoresis, fatigue and fever.   HENT:  Negative for congestion, nosebleeds, sore throat and trouble swallowing.    Eyes:  Negative for pain, discharge and visual disturbance.   Respiratory:  Negative for apnea, cough, chest tightness, shortness of breath, wheezing and stridor.    Cardiovascular:  Negative for chest pain, palpitations and leg swelling.   Gastrointestinal:  Positive for abdominal pain, constipation, nausea and vomiting. Negative for abdominal distention, blood in stool and diarrhea.   Endocrine: Negative for cold intolerance and heat intolerance.   Genitourinary:  Negative for difficulty urinating, dysuria, flank pain, frequency and urgency.   Musculoskeletal:  Negative for arthralgias, back pain, joint swelling, myalgias, neck pain and neck stiffness.   Skin:  Negative for rash and wound.   Allergic/Immunologic: Negative for food allergies and immunocompromised state.   Neurological:  Negative for dizziness, seizures, syncope, facial asymmetry, weakness, light-headedness and headaches.   Hematological:  Negative for adenopathy.   Psychiatric/Behavioral:  Negative for agitation, behavioral problems and confusion. The patient is not nervous/anxious.    Objective:     Vital Signs (Most Recent):  Temp: 97.5 °F (36.4 °C) (03/09/23 0106)  Pulse: 61 (03/09/23 0403)  Resp: 15 (03/09/23 0106)  BP: (!) 174/102 (03/09/23 0403)  SpO2: 100 % (03/09/23 0403)   Vital Signs (24h Range):  Temp:  [97.5 °F (36.4 °C)] 97.5 °F (36.4 °C)  Pulse:  [61-76] 61  Resp:  [15] 15  SpO2:  [99 %-100 %] 100 %  BP:  (167-174)/(100-102) 174/102     Weight: 67.7 kg (149 lb 2.3 oz)  Body mass index is 22.02 kg/m².    Physical Exam  Vitals and nursing note reviewed.   Constitutional:       Appearance: He is well-developed.   HENT:      Head: Normocephalic and atraumatic.      Nose: Nose normal.   Eyes:      General: No scleral icterus.  Cardiovascular:      Rate and Rhythm: Normal rate and regular rhythm.      Heart sounds: Murmur heard.     No friction rub. No gallop.   Pulmonary:      Effort: Pulmonary effort is normal. No respiratory distress.      Breath sounds: Normal breath sounds. No wheezing.   Abdominal:      General: Bowel sounds are normal. There is no distension.      Palpations: Abdomen is soft.      Tenderness: There is abdominal tenderness (generalized left sided TTP).      Comments: Large left ventral hernia- easily reducible    Musculoskeletal:         General: Normal range of motion.      Cervical back: Normal range of motion and neck supple.   Skin:     General: Skin is warm and dry.      Findings: No rash.   Neurological:      Mental Status: He is alert and oriented to person, place, and time.      Cranial Nerves: No cranial nerve deficit.   Psychiatric:         Mood and Affect: Mood is anxious.         Behavior: Behavior normal.           Significant Labs: All pertinent labs within the past 24 hours have been reviewed.    Significant Imaging: I have reviewed all pertinent imaging results/findings within the past 24 hours.

## 2023-03-09 NOTE — ED PROVIDER NOTES
SCRIBE #1 NOTE: I, Belia Garcia, am scribing for, and in the presence of, Santana Raman Jr., MD. I have scribed the entire note.       History     Chief Complaint   Patient presents with    Abdominal Pain     Abd pain since yesterday after taking Nexium for heartburn. Abd hernia noted. States 1 episode of vomiting.      Review of patient's allergies indicates:   Allergen Reactions    Sacubitril-valsartan Hives and Swelling    Amlodipine Edema     Ankle    Lactose Other (See Comments)     GI distress    Lisinopril Other (See Comments)    Mefloquine Hives and Itching     Anti malarial           History of Present Illness     HPI    3/9/2023, 1:23 AM  History obtained from the patient      History of Present Illness: Edmund Chu is a 82 y.o. male patient with a PMHx of CHF and HTN who presents to the Emergency Department for evaluation of abd pain which onset several hours PTA. Symptoms are constant and moderate in severity. Pt had a hernia mesh surgery in 2009. He is now c/o abd pain. He took nexium for heart burn. The pain sometimes radiates to groin. Pt states he had a small BM several days ago. Hernia noted in triage. Pt states he had an emesis episode as well. Pt has been having rhinorrhea and took an at-home COVID test which came out positive. Patient denies any dysuria, CP, SOB, dizziness, fever, and all other sxs at this time. No further complaints or concerns at this time.       Arrival mode: Personal vehicle      PCP: Srini Scott MD        Past Medical History:  Past Medical History:   Diagnosis Date    CHF (congestive heart failure)     Hypertension        Past Surgical History:  Past Surgical History:   Procedure Laterality Date    AORTIC VALVE REPLACEMENT  2016    colon section removal           Family History:  Family History   Problem Relation Age of Onset    Hypertension Mother     Colon cancer Father        Social History:  Social History     Tobacco Use    Smoking status: Former     Smokeless tobacco: Never   Substance and Sexual Activity    Alcohol use: Yes    Drug use: Never    Sexual activity: Not on file        Review of Systems     Review of Systems   Constitutional:  Negative for fever.   HENT:  Positive for rhinorrhea. Negative for sore throat.    Respiratory:  Negative for shortness of breath.    Cardiovascular:  Negative for chest pain.   Gastrointestinal:  Positive for abdominal pain and vomiting. Negative for nausea.   Genitourinary:  Negative for dysuria.   Musculoskeletal:  Negative for back pain.   Skin:  Negative for rash.   Neurological:  Negative for dizziness and weakness.   Hematological:  Does not bruise/bleed easily.   All other systems reviewed and are negative.     Physical Exam     Initial Vitals [03/09/23 0106]   BP Pulse Resp Temp SpO2   (!) 167/100 76 15 97.5 °F (36.4 °C) 99 %      MAP       --          Physical Exam  Nursing Notes and Vital Signs Reviewed.  Constitutional: Patient is in no acute distress. Well-developed and well-nourished.  Head: Atraumatic. Normocephalic.  Eyes:  EOM intact.  No scleral icterus.  ENT: Mucous membranes are moist.  Nares clear   Neck:  Full ROM. No JVD.  Cardiovascular: Regular rate. Regular rhythm No murmurs, rubs, or gallops. Distal pulses are 2+ and symmetric  Pulmonary/Chest: No respiratory distress. Clear to auscultation bilaterally. No wheezing or rales.  Equal chest wall rise bilaterally  Abdominal: Soft and non-distended.  Easily reducible large ventral hernia.  Nontender    Genitourinary: No CVA tenderness.  No suprapubic tenderness  Musculoskeletal: Moves all extremities. No obvious deformities.  5 x 5 strength in all extremities   Skin: Warm and dry.  Neurological:  Alert, awake, and appropriate.  Normal speech.  No acute focal neurological deficits are appreciated.  Two through 12 intact bilaterally.  Psychiatric: Normal affect. Good eye contact. Appropriate in content.      ED Course   Procedures  ED Vital Signs:  Vitals:     03/09/23 0106 03/09/23 0403   BP: (!) 167/100 (!) 174/102   Pulse: 76 61   Resp: 15    Temp: 97.5 °F (36.4 °C)    TempSrc: Oral    SpO2: 99% 100%   Weight: 67.7 kg (149 lb 2.3 oz)        Abnormal Lab Results:  Labs Reviewed   CBC W/ AUTO DIFFERENTIAL - Abnormal; Notable for the following components:       Result Value    MCH 31.6 (*)     Gran # (ANC) 7.9 (*)     Lymph # 0.9 (*)     Gran % 81.8 (*)     Lymph % 9.5 (*)     All other components within normal limits   COMPREHENSIVE METABOLIC PANEL - Abnormal; Notable for the following components:    Sodium 133 (*)     Potassium 5.5 (*)     CO2 21 (*)     Glucose 125 (*)     Calcium 10.8 (*)     Total Protein 9.7 (*)     Total Bilirubin 1.2 (*)     Alkaline Phosphatase 147 (*)     eGFR 56 (*)     All other components within normal limits   URINALYSIS, REFLEX TO URINE CULTURE - Abnormal; Notable for the following components:    Protein, UA Trace (*)     Glucose, UA 3+ (*)     Ketones, UA Trace (*)     All other components within normal limits    Narrative:     Specimen Source->Urine   LIPASE   SARS-COV-2 RNA AMPLIFICATION, QUAL   URINALYSIS MICROSCOPIC    Narrative:     Specimen Source->Urine        All Lab Results:  Results for orders placed or performed during the hospital encounter of 03/09/23   CBC auto differential   Result Value Ref Range    WBC 9.69 3.90 - 12.70 K/uL    RBC 4.71 4.60 - 6.20 M/uL    Hemoglobin 14.9 14.0 - 18.0 g/dL    Hematocrit 43.8 40.0 - 54.0 %    MCV 93 82 - 98 fL    MCH 31.6 (H) 27.0 - 31.0 pg    MCHC 34.0 32.0 - 36.0 g/dL    RDW 13.7 11.5 - 14.5 %    Platelets 231 150 - 450 K/uL    MPV 10.3 9.2 - 12.9 fL    Immature Granulocytes 0.2 0.0 - 0.5 %    Gran # (ANC) 7.9 (H) 1.8 - 7.7 K/uL    Immature Grans (Abs) 0.02 0.00 - 0.04 K/uL    Lymph # 0.9 (L) 1.0 - 4.8 K/uL    Mono # 0.8 0.3 - 1.0 K/uL    Eos # 0.0 0.0 - 0.5 K/uL    Baso # 0.02 0.00 - 0.20 K/uL    nRBC 0 0 /100 WBC    Gran % 81.8 (H) 38.0 - 73.0 %    Lymph % 9.5 (L) 18.0 - 48.0 %     Mono % 7.9 4.0 - 15.0 %    Eosinophil % 0.4 0.0 - 8.0 %    Basophil % 0.2 0.0 - 1.9 %    Differential Method Automated    Comprehensive metabolic panel   Result Value Ref Range    Sodium 133 (L) 136 - 145 mmol/L    Potassium 5.5 (H) 3.5 - 5.1 mmol/L    Chloride 97 95 - 110 mmol/L    CO2 21 (L) 23 - 29 mmol/L    Glucose 125 (H) 70 - 110 mg/dL    BUN 20 8 - 23 mg/dL    Creatinine 1.3 0.5 - 1.4 mg/dL    Calcium 10.8 (H) 8.7 - 10.5 mg/dL    Total Protein 9.7 (H) 6.0 - 8.4 g/dL    Albumin 3.9 3.5 - 5.2 g/dL    Total Bilirubin 1.2 (H) 0.1 - 1.0 mg/dL    Alkaline Phosphatase 147 (H) 55 - 135 U/L    AST 23 10 - 40 U/L    ALT 18 10 - 44 U/L    Anion Gap 15 8 - 16 mmol/L    eGFR 56 (A) >60 mL/min/1.73 m^2   Lipase   Result Value Ref Range    Lipase 21 4 - 60 U/L   Urinalysis, Reflex to Urine Culture Urine, Clean Catch    Specimen: Urine   Result Value Ref Range    Specimen UA Urine, Clean Catch     Color, UA Yellow Yellow, Straw, Priscilla    Appearance, UA Clear Clear    pH, UA 6.0 5.0 - 8.0    Specific Gravity, UA 1.020 1.005 - 1.030    Protein, UA Trace (A) Negative    Glucose, UA 3+ (A) Negative    Ketones, UA Trace (A) Negative    Bilirubin (UA) Negative Negative    Occult Blood UA Negative Negative    Nitrite, UA Negative Negative    Urobilinogen, UA Negative <2.0 EU/dL    Leukocytes, UA Negative Negative   COVID-19 Rapid Screening   Result Value Ref Range    SARS-CoV-2 RNA, Amplification, Qual Negative Negative   Urinalysis Microscopic   Result Value Ref Range    Bacteria None None-Occ /hpf    Yeast, UA None None    Microscopic Comment SEE COMMENT         Imaging Results:  Imaging Results              CT Abdomen Pelvis  Without Contrast (Final result)  Result time 03/09/23 03:25:15      Final result by Néstor Jerez MD (03/09/23 03:25:15)                   Impression:      1.  Small bowel obstruction with numerous fluid-filled dilated proximal and mid small bowel loops noted, measuring up to 4 cm in diameter.  The  stomach and duodenum are also fluid-filled and prominent.  There are postoperative changes in the loop of small bowel within the right anterior abdomen.  The small bowel on both sides of this postoperative change or fluid-filled although not definitely dilated.  There is a left anterior abdominal wall hernia filled with dilated small bowel although this does not appear to be causing obstruction. The exact transition point is difficult to visualize and may be in and amongst the loops of bowel abutting the anterior abdominal wall mesh.  There is a mild amount of ascites, most likely related to the small bowel obstruction.  Negative for free air to suggest perforation.  2.  There is a left inguinal hernia filled with a loop of nondilated colon.  This does not appear to be causing obstruction of the colon, which is normal in caliber, although this hernia may be incarcerated.  Clinical correlation is advised.  3.  Negative for acute process otherwise.  Normal appendix.  Negative for renal stone disease or hydronephrosis.  4.  Middle lobe and lingular scarring or atelectasis.  Dependent atelectasis in the lung bases.  Negative for focal alveolar infiltrate.  5.  Cardiac chamber enlargement with pacemaker in place.  Prosthetic valve device in place.  Coronary artery calcifications.  Evidence for granulomatous disease.  Degenerative changes of the spine and pelvis.  Other nonemergent findings as described above.      All CT scans at [this location] are performed using dose modulation techniques as appropriate to a performed exam including the following: automated exposure control; adjustment of the mA and/or kV according to patient size (this includes techniques or standardized protocols for targeted exams where dose is matched to indication / reason for exam; i.e. extremities or head); use of iterative reconstruction technique.    Finalized on: 3/9/2023 3:25 AM By:  Néstor Jerez MD  BR[facility]# [id number]      2023-03-09  03:28:02.889    R               Narrative:    EXAM: CT ABDOMEN PELVIS WITHOUT CONTRAST, multiplanar reconstructions    TECHNIQUE:  Axial images through the abdomen and pelvis were obtained without IV or oral contrast. Sagittal and coronal reconstructions are provided for review.    CLINICAL INDICATION: Abdominal pain.  Bowel obstruction suspected    FINDINGS: No comparison studies are available.    Lung Bases: Inferior middle lobe and lingular scarring or atelectasis.  Posterior right lower lobe dependent atelectasis.  Calcified granulomas are present within the medial right lower lobe.  Lung bases are otherwise clear.  Negative for pleural or pericardial effusions.  The distal esophagus is prominent and fluid-filled. Pacemaker wires are in place.  Cardiac chambers are enlarged.  Median sternotomy wires with prosthetic valve device in place.  Coronary artery calcifications noted.    Abdomen: The gallbladder is contracted.  One of the limbs of the left adrenal gland are thickened, non-masslike.  Calcified granuloma in the spleen. Noncontrasted appearances of the liver, pancreas, and spleen are otherwise normal. The gallbladder and biliary tree are normal. The adrenal glands are otherwise normal.    The kidneys are without solid or cystic masses, hydronephrosis or renal stones.    There is a trace amount of ascites in the right paracolic gutter.    Negative for definite adenopathy demonstrated.  Vascular calcifications are present without aneurysmal change.    There are numerous dilated and fluid-filled loops of small bowel involving the proximal and mid small bowel, measuring up to 4 cm in diameter.  The stomach is also mildly dilated and fluid-filled.  There are postoperative changes to the small bowel in the right anterior pericolic gutter region.  This may be the transition point.  Distal small bowel is more normal in caliber, and the colon is normal in caliber.  There is a wide based left anterior abdominal  wall hernia filled with a loop of dilated small bowel although this does not appear to be causing obstruction.  There is a left inguinal hernia filled with a loop of colon, which also does not appear to represent a source of obstruction.  There are a few scattered clonic diverticula.  Fat filled right inguinal hernia noted.  Negative for free air.  Normal appendix.    Pelvis:  The urinary bladder is normal.     The male pelvic organs are normal.  Numerous pelvic phleboliths noted.    There are postoperative changes to the anterior abdominal wall, with changes suggesting of an abdominal wall mesh with calcifications on the mesh.  The left anterior abdominal wall hernia appears to be superior to this mesh.  Abdominal wall is otherwise intact.    Negative for osseous abnormalities.  Negative for significant spinal canal stenosis. Disc height reduction with endplate sclerosis, marginal spondylosis and vacuum disc phenomenon involves nearly all of the visualized lower thoracic and lumbar disks, sparing the T12/L1 disc.  Multilevel degenerative facet arthropathy noted as well.  Ankylosis of the right sacroiliac joint.  Moderate degenerative changes of the pubic symphysis.    Negative for groin adenopathy.                                                  The Emergency Provider reviewed the vital signs and test results, which are outlined above.     ED Discussion     3:45 AM: Discussed pt's case with Dr. Hill (General Surgery) who states that given his cardiac history with an ejection fraction of 29% on his last echocardiogram can you see if hospital medicine would admit the patient and obtain a cardiology consult.     3:48 AM: Discussed case with Abigial Marrero NP (Hospital Medicine). Dr. Coleman agrees with current care and management of pt and accepts admission.   Admitting Service: Hospital Medicine  Admitting Physician: Dr. Coleman  Admit to: inpatient med/tele     3:48 AM: Re-evaluated pt. I have discussed test  results, shared treatment plan, and the need for admission with patient and family at bedside. Pt and family express understanding at this time and agree with all information. All questions answered. Pt and family have no further questions or concerns at this time. Pt is ready for admit.         Medical Decision Making:   History:   Old Medical Records: I decided to obtain old medical records.  Old Records Summarized: records from clinic visits and records from previous admission(s).       <> Summary of Records: Patient has an extensive cardiac history.  This involves for seems valve as well as history of congestive heart failure, known coronary artery disease and ICD placement.  Also reviewed the patient's med list.  I note no blood thinners.  Initial Assessment:   Patient is stable nontoxic.  Patient's physical examination shows an easily reducible nontender ventral hernia.  Differential Diagnosis:   Small-bowel obstruction, intra-abdominal abscess, incarcerated hernia, strangulated hernia, ventral hernia.  Clinical Tests:   Lab Tests: Ordered and Reviewed  Radiological Study: Ordered and Reviewed  ED Management:  I have ordered all laboratory studies.  Patient has normal lipase negative COVID clean urine normal white count.  Patient does have a mild elevation in his glucose at 1:25 a.m. with normal renal function.  He has a mild elevation in potassium at 5.5 however this is likely related to lab error.  I ordered visualize the CT scan.  I agree with the radiologic read small-bowel obstruction.  Also agree with inguinal hernia.  Other:   I have discussed this case with another health care provider.       <> Summary of the Discussion: Case initially discussed with Dr. Andrew Hill with General surgery.  He is aware the patient's prior medical history as well as current diagnosis.  Light of this, he request admission to hospital Medicine Service with him following for the bowel obstruction.  Subsequently  discussed the case with Abigail Marrero from [facility] who accepts the patient on behalf of  for inpatient Med tele.  inpatient med tele.  Discussed all findings the patient answered all his questions.  Agrees with plan of care         ED Medication(s):  Medications   famotidine tablet 20 mg (20 mg Oral Given 3/9/23 0127)   aluminum-magnesium hydroxide-simethicone 200-200-20 mg/5 mL suspension 30 mL (30 mLs Oral Given 3/9/23 0130)     And   LIDOcaine HCl 2% oral solution 15 mL (15 mLs Oral Given 3/9/23 0130)       New Prescriptions    No medications on file               Scribe Attestation:   Scribe #1: I performed the above scribed service and the documentation accurately describes the services I performed. I attest to the accuracy of the note.     Attending:   Physician Attestation Statement for Scribe #1: I, Santana Raman Jr., MD, personally performed the services described in this documentation, as scribed by Belia Garcia, in my presence, and it is both accurate and complete.           Clinical Impression       ICD-10-CM ICD-9-CM   1. Small bowel obstruction  K56.609 560.9       Disposition:   Disposition: Admitted  Condition: Fair      Santana Raman Jr., MD  03/09/23 0144

## 2023-03-09 NOTE — PHARMACY MED REC
"Admission Medication History     The home medication history was taken by Jose C Collazo.    You may go to "Admission" then "Reconcile Home Medications" tabs to review and/or act upon these items.     The home medication list has been updated by the Pharmacy department.   Please read ALL comments highlighted in yellow.   Please address this information as you see fit.    Feel free to contact us if you have any questions or require assistance.      The medications listed below were removed from the home medication list. Please reorder if appropriate:  Patient reports no longer taking the following medication(s):  COREG 25MG    Medications listed below were obtained from: [facility] and Medical records  (Not in a hospital admission)      Jose C Collazo  [id number]    Current Outpatient Medications on File Prior to Encounter   Medication Sig Dispense Refill Last Dose    coenzyme Q10 100 mg capsule Take 100 mg by mouth once daily.       colchicine (COLCRYS) 0.6 mg tablet Take 0.6 mg by mouth once daily.       dapagliflozin (FARXIGA) 10 mg tablet Take 10 mg by mouth once daily.       LORazepam (ATIVAN) 0.5 MG tablet Take 1 mg by mouth 2 (two) times daily.       olmesartan (BENICAR) 40 MG tablet Take 20 mg by mouth once daily.       spironolactone (ALDACTONE) 25 MG tablet Take 1 tablet (25 mg total) by mouth once daily. 90 tablet 3     torsemide (DEMADEX) 20 MG Tab Take 30 mg by mouth once daily.       tumeric-ging-olive-oreg-capryl 100 mg-150 mg- 50 mg-150 mg Cap Take 1 capsule by mouth once daily.       zolpidem (AMBIEN) 10 mg Tab Take 5 mg by mouth nightly as needed.                              .        "

## 2023-03-09 NOTE — HOSPITAL COURSE
CT scan was performed.  Findings were concerning for a bowel obstruction.  Was noted to have a wide-based ventral hernia as well as inguinal hernia    They the patient is resting comfortably.  His abdomen is soft    03/10/2023.  Small-bowel follow-through showed no obstruction.  Patient multiple bowel movements.  Abdominal films today show contrast in the colon but 1 remaining dilated of bowel.  Patient is anxious be discharged dressing is his wife's birthday

## 2023-03-09 NOTE — ASSESSMENT & PLAN NOTE
Pt reports he takes Ativan 1mg po BID as home med   Pt requests to continue Ativan  Will order Ativan 0.5mg BID prn

## 2023-03-09 NOTE — HPI
Patient presented to the emergency room yesterday with abdominal pain and nausea vomiting.  The abdominal pain was in the epigastrium region and occurred after taking a Nexium for heartburn.  The patient also noted that his longstanding ventral hernia was somewhat more protuberant.  He also states that the hernia tends to move and that it occasion feels barrera below the hernia.  He is always been able to push the hernia back in but it was a little firmer yesterday.      Patient reports having a bowel movement yesterday    Patie and a half a day but rather slowly    He was evaluated in the emergency room and surgery was asked to see the patient in consultation

## 2023-03-09 NOTE — SUBJECTIVE & OBJECTIVE
Past Medical History:   Diagnosis Date    Aortic valve prosthesis present     CHF (congestive heart failure)     CKD (chronic kidney disease) stage 3, GFR 30-59 ml/min     Colon cancer     Hypertension     PAF (paroxysmal atrial fibrillation)        Past Surgical History:   Procedure Laterality Date    AORTIC VALVE REPLACEMENT  2016    COLON RESECTION         Review of patient's allergies indicates:   Allergen Reactions    Sacubitril-valsartan Hives and Swelling    Amlodipine Edema     Ankle    Lactose Other (See Comments)     GI distress    Lisinopril Other (See Comments)    Mefloquine Hives and Itching     Anti malarial         No current facility-administered medications on file prior to encounter.     Current Outpatient Medications on File Prior to Encounter   Medication Sig    carvediloL (COREG) 25 MG tablet Take 25 mg by mouth once daily.    coenzyme Q10 100 mg capsule Take 100 mg by mouth once daily.    colchicine (COLCRYS) 0.6 mg tablet Take 0.6 mg by mouth once daily.    dapagliflozin (FARXIGA) 10 mg tablet Take 10 mg by mouth once daily.    LORazepam (ATIVAN) 0.5 MG tablet Take 1 mg by mouth 2 (two) times daily.    olmesartan (BENICAR) 40 MG tablet Take 20 mg by mouth once daily.    spironolactone (ALDACTONE) 25 MG tablet Take 1 tablet (25 mg total) by mouth once daily.    torsemide (DEMADEX) 20 MG Tab Take 30 mg by mouth once daily.    tumeric-ging-olive-oreg-capryl 100 mg-150 mg- 50 mg-150 mg Cap Take 1 capsule by mouth once daily.    zolpidem (AMBIEN) 10 mg Tab Take 5 mg by mouth nightly as needed.    [DISCONTINUED] albuterol (VENTOLIN HFA) 90 mcg/actuation inhaler Inhale 2 puffs into the lungs every 6 (six) hours as needed for Wheezing. Rescue    [DISCONTINUED] carboxymethylcellulose (REFRESH PLUS) 0.5 % Dpet 1 drop 3 (three) times daily as needed.    [DISCONTINUED] carvediloL (COREG) 12.5 MG tablet Take 2 tablets (25 mg total) by mouth 2 (two) times daily with meals.    [DISCONTINUED] dietary  supplement,misc comb14 24-21 mg Cap Take by mouth.    [DISCONTINUED] diphenhydrAMINE (BENADRYL) 25 mg capsule Take 25 mg by mouth every 6 (six) hours as needed for Itching (q 6 hours prn).    [DISCONTINUED] flaxseed Powd Take by mouth once daily.    [DISCONTINUED] zolpidem (AMBIEN) 5 MG Tab      Family History       Problem Relation (Age of Onset)    Colon cancer Father    Hypertension Mother          Tobacco Use    Smoking status: Former    Smokeless tobacco: Never   Substance and Sexual Activity    Alcohol use: Yes    Drug use: Never    Sexual activity: Not on file     Review of Systems   Constitutional: Negative.   HENT: Negative.     Eyes: Negative.    Cardiovascular: Negative.    Respiratory: Negative.     Skin: Negative.    Musculoskeletal: Negative.    Gastrointestinal:  Positive for bloating, nausea and vomiting.   Genitourinary: Negative.    Neurological: Negative.    Psychiatric/Behavioral: Negative.     Objective:     Vital Signs (Most Recent):  Temp: 97.2 °F (36.2 °C) (03/09/23 0729)  Pulse: (!) 59 (03/09/23 0703)  Resp: 20 (03/09/23 0703)  BP: (!) 149/83 (03/09/23 0801)  SpO2: 100 % (03/09/23 0703)   Vital Signs (24h Range):  Temp:  [97.2 °F (36.2 °C)-97.5 °F (36.4 °C)] 97.2 °F (36.2 °C)  Pulse:  [58-76] 59  Resp:  [15-20] 20  SpO2:  [99 %-100 %] 100 %  BP: (143-174)/() 149/83     Weight: 67.7 kg (149 lb 2.3 oz)  Body mass index is 22.02 kg/m².    SpO2: 100 %       No intake or output data in the 24 hours ending 03/09/23 1106    Lines/Drains/Airways       Peripheral Intravenous Line  Duration                  Peripheral IV - Single Lumen 03/09/23 0130 20 G Anterior;Distal;Left Upper Arm <1 day                    Physical Exam  Vitals and nursing note reviewed.   Constitutional:       Appearance: Normal appearance.   HENT:      Head: Normocephalic and atraumatic.   Eyes:      General:         Right eye: No discharge.         Left eye: No discharge.      Pupils: Pupils are equal, round, and reactive  to light.   Cardiovascular:      Rate and Rhythm: Normal rate and regular rhythm.      Heart sounds: S1 normal and S2 normal. Murmur heard.     No friction rub.   Pulmonary:      Effort: Pulmonary effort is normal. No respiratory distress.      Breath sounds: Normal breath sounds. No rales.   Abdominal:      Palpations: Abdomen is soft.      Tenderness: There is no abdominal tenderness.      Hernia: A hernia is present.      Comments: reducible   Musculoskeletal:      Cervical back: Neck supple.      Right lower leg: No edema.      Left lower leg: No edema.   Skin:     General: Skin is warm and dry.   Neurological:      General: No focal deficit present.      Mental Status: He is alert and oriented to person, place, and time.   Psychiatric:         Mood and Affect: Mood normal.         Behavior: Behavior normal.         Thought Content: Thought content normal.       Significant Labs: BMP:   Recent Labs   Lab 03/09/23 0127 03/09/23 0518   * 98   * 134*   K 5.5* 4.9   CL 97 101   CO2 21* 20*   BUN 20 20   CREATININE 1.3 1.1   CALCIUM 10.8* 9.4   MG  --  2.0   , CMP   Recent Labs   Lab 03/09/23 0127 03/09/23 0518   * 134*   K 5.5* 4.9   CL 97 101   CO2 21* 20*   * 98   BUN 20 20   CREATININE 1.3 1.1   CALCIUM 10.8* 9.4   PROT 9.7* 7.5   ALBUMIN 3.9 2.9*   BILITOT 1.2* 0.9   ALKPHOS 147* 113   AST 23 19   ALT 18 15   ANIONGAP 15 13   , CBC   Recent Labs   Lab 03/09/23 0127 03/09/23 0518   WBC 9.69 7.95   HGB 14.9 12.9*   HCT 43.8 37.5*    211   , INR No results for input(s): INR, PROTIME in the last 48 hours., Lipid Panel No results for input(s): CHOL, HDL, LDLCALC, TRIG, CHOLHDL in the last 48 hours., Troponin No results for input(s): TROPONINI in the last 48 hours., and All pertinent lab results from the last 24 hours have been reviewed.    Significant Imaging: Echocardiogram: Transthoracic echo (TTE) complete (Cupid Only):   Results for orders placed or performed during the  hospital encounter of 12/19/22   Echo   Result Value Ref Range    BSA 1.89 m2    TDI SEPTAL 0.04 m/s    LV LATERAL E/E' RATIO 8.71 m/s    LV SEPTAL E/E' RATIO 15.25 m/s    LA WIDTH 4.70 cm    Left Ventricular Outflow Tract Mean Velocity 0.30 cm/s    Left Ventricular Outflow Tract Mean Gradient 0.41 mmHg    TDI LATERAL 0.07 m/s    PV PEAK VELOCITY 1.19 cm/s    LVIDd 5.63 3.5 - 6.0 cm    IVS 0.93 0.6 - 1.1 cm    Posterior Wall 0.88 0.6 - 1.1 cm    LVIDs 5.47 (A) 2.1 - 4.0 cm    FS 3 28 - 44 %    LA volume 163.75 cm3    Sinus 2.42 cm    STJ 2.78 cm    LV mass 194.77 g    LA size 5.43 cm    RVDD 4.96 cm    TAPSE 1.12 cm    Left Ventricle Relative Wall Thickness 0.31 cm    AV mean gradient 6 mmHg    AV valve area 0.89 cm2    AV Velocity Ratio 0.26     AV index (prosthetic) 0.27     MV valve area p 1/2 method 5.92 cm2    E/A ratio 1.74     Mean e' 0.06 m/s    E wave deceleration time 128.19 msec    IVRT 72.31 msec    LVOT diameter 2.05 cm    LVOT area 3.3 cm2    LVOT peak eleazar 0.41 m/s    LVOT peak VTI 7.40 cm    Ao peak eleazar 1.57 m/s    Ao VTI 27.4 cm    RVOT peak eleazar 0.38 m/s    RVOT peak VTI 5.9 cm    LVOT stroke volume 24.41 cm3    AV peak gradient 10 mmHg    PV mean gradient 0.23 mmHg    E/E' ratio 11.09 m/s    MV Peak E Eleazar 0.61 m/s    TR Max Eleazar 3.68 m/s    MV stenosis pressure 1/2 time 37.18 ms    MV Peak A Eleazar 0.35 m/s    LV Systolic Volume 145.58 mL    LV Systolic Volume Index 77.0 mL/m2    LV Diastolic Volume 155.74 mL    LV Diastolic Volume Index 82.40 mL/m2    LA Volume Index 86.6 mL/m2    LV Mass Index 103 g/m2    RA Major Axis 7.46 cm    Left Atrium Minor Axis 7.44 cm    Left Atrium Major Axis 7.66 cm    Triscuspid Valve Regurgitation Peak Gradient 54 mmHg    LA Volume Index (Mod) 51.9 mL/m2    LA volume (mod) 98.02 cm3    RA Width 5.99 cm    EF 15 %    Narrative    · The left ventricle is mildly enlarged with severely decreased systolic   function.  · The estimated ejection fraction is 15 - 20%.  · Grade II  left ventricular diastolic dysfunction.  · Moderate right ventricular enlargement with severely reduced right   ventricular systolic function.  · Severe left atrial enlargement.  · Severe right atrial enlargement.  · There is a 23 mm Freire II porcine bioprosthetic aortic valve present.   There is trivial central aortic insufficiency present.  · The aortic valve mean gradient is 6 mmHg with a dimensionless index of   0.27.  · Moderate-to-severe mitral regurgitation.  · Moderate to severe tricuspid regurgitation.  · Moderate pulmonic regurgitation.  · The estimated PA systolic pressure is greater than 54 mmHg.  · There is pulmonary hypertension.      , EKG: reviewed, and X-Ray: CXR: X-Ray Chest 1 View (CXR): No results found for this visit on 03/09/23.

## 2023-03-10 VITALS
WEIGHT: 143.94 LBS | HEART RATE: 68 BPM | BODY MASS INDEX: 21.26 KG/M2 | OXYGEN SATURATION: 97 % | SYSTOLIC BLOOD PRESSURE: 157 MMHG | DIASTOLIC BLOOD PRESSURE: 84 MMHG | RESPIRATION RATE: 18 BRPM | TEMPERATURE: 98 F

## 2023-03-10 LAB
ALBUMIN SERPL BCP-MCNC: 3.1 G/DL (ref 3.5–5.2)
ALP SERPL-CCNC: 106 U/L (ref 55–135)
ALT SERPL W/O P-5'-P-CCNC: 13 U/L (ref 10–44)
ANION GAP SERPL CALC-SCNC: 13 MMOL/L (ref 8–16)
ANISOCYTOSIS BLD QL SMEAR: SLIGHT
AST SERPL-CCNC: 19 U/L (ref 10–40)
BASOPHILS # BLD AUTO: 0.02 K/UL (ref 0–0.2)
BASOPHILS NFR BLD: 0.5 % (ref 0–1.9)
BILIRUB SERPL-MCNC: 1 MG/DL (ref 0.1–1)
BUN SERPL-MCNC: 27 MG/DL (ref 8–23)
CALCIUM SERPL-MCNC: 9.4 MG/DL (ref 8.7–10.5)
CHLORIDE SERPL-SCNC: 107 MMOL/L (ref 95–110)
CO2 SERPL-SCNC: 20 MMOL/L (ref 23–29)
CREAT SERPL-MCNC: 1.2 MG/DL (ref 0.5–1.4)
DACRYOCYTES BLD QL SMEAR: ABNORMAL
DIFFERENTIAL METHOD: ABNORMAL
EOSINOPHIL # BLD AUTO: 0.3 K/UL (ref 0–0.5)
EOSINOPHIL NFR BLD: 7.1 % (ref 0–8)
ERYTHROCYTE [DISTWIDTH] IN BLOOD BY AUTOMATED COUNT: 14.2 % (ref 11.5–14.5)
EST. GFR  (NO RACE VARIABLE): >60 ML/MIN/1.73 M^2
GLUCOSE SERPL-MCNC: 76 MG/DL (ref 70–110)
HCT VFR BLD AUTO: 40.2 % (ref 40–54)
HGB BLD-MCNC: 13.2 G/DL (ref 14–18)
IMM GRANULOCYTES # BLD AUTO: 0 K/UL (ref 0–0.04)
IMM GRANULOCYTES NFR BLD AUTO: 0 % (ref 0–0.5)
LYMPHOCYTES # BLD AUTO: 1.1 K/UL (ref 1–4.8)
LYMPHOCYTES NFR BLD: 24.8 % (ref 18–48)
MAGNESIUM SERPL-MCNC: 2.2 MG/DL (ref 1.6–2.6)
MCH RBC QN AUTO: 31.5 PG (ref 27–31)
MCHC RBC AUTO-ENTMCNC: 32.8 G/DL (ref 32–36)
MCV RBC AUTO: 96 FL (ref 82–98)
MONOCYTES # BLD AUTO: 0.9 K/UL (ref 0.3–1)
MONOCYTES NFR BLD: 21 % (ref 4–15)
NEUTROPHILS # BLD AUTO: 2 K/UL (ref 1.8–7.7)
NEUTROPHILS NFR BLD: 46.6 % (ref 38–73)
NRBC BLD-RTO: 0 /100 WBC
PHOSPHATE SERPL-MCNC: 3.1 MG/DL (ref 2.7–4.5)
PLATELET # BLD AUTO: 196 K/UL (ref 150–450)
PLATELET BLD QL SMEAR: ABNORMAL
PMV BLD AUTO: 10.8 FL (ref 9.2–12.9)
POTASSIUM SERPL-SCNC: 4.5 MMOL/L (ref 3.5–5.1)
PROT SERPL-MCNC: 7.5 G/DL (ref 6–8.4)
RBC # BLD AUTO: 4.19 M/UL (ref 4.6–6.2)
SODIUM SERPL-SCNC: 140 MMOL/L (ref 136–145)
WBC # BLD AUTO: 4.24 K/UL (ref 3.9–12.7)

## 2023-03-10 PROCEDURE — 25000003 PHARM REV CODE 250: Performed by: NURSE PRACTITIONER

## 2023-03-10 PROCEDURE — 94799 UNLISTED PULMONARY SVC/PX: CPT

## 2023-03-10 PROCEDURE — 99233 SBSQ HOSP IP/OBS HIGH 50: CPT | Mod: ,,, | Performed by: SURGERY

## 2023-03-10 PROCEDURE — 99233 PR SUBSEQUENT HOSPITAL CARE,LEVL III: ICD-10-PCS | Mod: ,,, | Performed by: SURGERY

## 2023-03-10 PROCEDURE — 83735 ASSAY OF MAGNESIUM: CPT | Performed by: NURSE PRACTITIONER

## 2023-03-10 PROCEDURE — 84100 ASSAY OF PHOSPHORUS: CPT | Performed by: NURSE PRACTITIONER

## 2023-03-10 PROCEDURE — 99900035 HC TECH TIME PER 15 MIN (STAT)

## 2023-03-10 PROCEDURE — 85025 COMPLETE CBC W/AUTO DIFF WBC: CPT | Performed by: NURSE PRACTITIONER

## 2023-03-10 PROCEDURE — 36415 COLL VENOUS BLD VENIPUNCTURE: CPT | Performed by: NURSE PRACTITIONER

## 2023-03-10 PROCEDURE — 80053 COMPREHEN METABOLIC PANEL: CPT | Performed by: NURSE PRACTITIONER

## 2023-03-10 RX ADMIN — SODIUM CHLORIDE: 9 INJECTION, SOLUTION INTRAVENOUS at 05:03

## 2023-03-10 RX ADMIN — ZOLPIDEM TARTRATE 5 MG: 5 TABLET ORAL at 12:03

## 2023-03-10 RX ADMIN — LORAZEPAM 0.5 MG: 0.5 TABLET ORAL at 12:03

## 2023-03-10 NOTE — PROGRESS NOTES
[facility]  General Surgery  Progress Note    Subjective:     History of Present Illness:  Patient presented to the emergency room yesterday with abdominal pain and nausea vomiting.  The abdominal pain was in the epigastrium region and occurred after taking a Nexium for heartburn.  The patient also noted that his longstanding ventral hernia was somewhat more protuberant.  He also states that the hernia tends to move and that it occasion feels barrera below the hernia.  He is always been able to push the hernia back in but it was a little firmer yesterday.      Patient reports having a bowel movement yesterday    Patie and a half a day but rather slowly    He was evaluated in the emergency room and surgery was asked to see the patient in consultation      Post-Op Info:  * No surgery found *         Interval History:  Multiple bowel movements.  X-rays still showed dilated loops of small bowel concerning for partial bowel obstruction likely low-grade.  Patient is anxious to go home.  Will advance to a regular diet.      Will discuss x-ray findings implication with the patient and dictated under separate cover.    Medications:  Continuous Infusions:   sodium chloride 0.9% 75 mL/hr at 03/10/23 0550     Scheduled Meds:   enoxaparin  40 mg Subcutaneous Daily     PRN Meds:dextrose 10%, dextrose 10%, glucagon (human recombinant), glucose, glucose, hydrALAZINE, LORazepam, morphine, naloxone, ondansetron, prochlorperazine, sodium chloride 0.9%, zolpidem     Review of patient's allergies indicates:   Allergen Reactions    Sacubitril-valsartan Hives and Swelling    Amlodipine Edema     Ankle    Lactose Other (See Comments)     GI distress    Lisinopril Other (See Comments)    Mefloquine Hives and Itching     Anti malarial       Objective:     Vital Signs (Most Recent):  Temp: 97.9 °F (36.6 °C) (03/10/23 0422)  Pulse: 77 (03/10/23 0500)  Resp: 18 (03/10/23 0422)  BP: (!) 163/75 (03/10/23 0422)  SpO2: 99 % (03/10/23  0422)   Vital Signs (24h Range):  Temp:  [96.6 °F (35.9 °C)-98 °F (36.7 °C)] 97.9 °F (36.6 °C)  Pulse:  [52-77] 77  Resp:  [16-21] 18  SpO2:  [97 %-100 %] 99 %  BP: (129-166)/(67-79) 163/75     Weight: 65.3 kg (143 lb 15.4 oz)  Body mass index is 21.26 kg/m².    Intake/Output - Last 3 Shifts         03/08 0700  03/09 0659 03/09 0700  03/10 0659 03/10 0700  03/11 0659           Urine Occurrence  1 x             Physical Exam  Vitals and nursing note reviewed.   Constitutional:       General: He is not in acute distress.     Appearance: He is well-developed. He is ill-appearing (Chronically).      Comments: Very thin   HENT:      Head: Normocephalic and atraumatic.   Eyes:      General: No scleral icterus.     Pupils: Pupils are equal, round, and reactive to light.   Neck:      Thyroid: No thyromegaly.      Vascular: No JVD.      Trachea: No tracheal deviation.   Cardiovascular:      Rate and Rhythm: Normal rate and regular rhythm.      Heart sounds: Normal heart sounds.   Pulmonary:      Effort: Pulmonary effort is normal.      Breath sounds: Normal breath sounds.   Abdominal:      General: Abdomen is flat. Bowel sounds are normal. There is no distension.      Palpations: Abdomen is soft. There is no mass.      Tenderness: There is no abdominal tenderness. There is no guarding or rebound.      Hernia: Hernia: reducible wide mouth hernia.   Musculoskeletal:         General: Normal range of motion.      Cervical back: Normal range of motion and neck supple.   Lymphadenopathy:      Cervical: No cervical adenopathy.   Skin:     General: Skin is warm and dry.   Neurological:      Mental Status: He is alert and oriented to person, place, and time.   Psychiatric:         Behavior: Behavior normal.         Thought Content: Thought content normal.         Judgment: Judgment normal.       Significant Labs:  I have reviewed all pertinent lab results within the past 24 hours.  CBC:   Recent Labs   Lab 03/10/23  0616   WBC 4.24    RBC 4.19*   HGB 13.2*   HCT 40.2      MCV 96   MCH 31.5*   MCHC 32.8     BMP:   Recent Labs   Lab 03/10/23  0616   GLU 76      K 4.5      CO2 20*   BUN 27*   CREATININE 1.2   CALCIUM 9.4   MG 2.2       Significant Diagnostics:  I have reviewed all pertinent imaging results/findings within the past 24 hours.  Small-bowel follow-through and abdominal x-ray    Reading Physician Reading Date Result Priority   Rafaela Rivera MD  [phone] 3/9/2023 STAT     Narrative & Impression  EXAMINATION:  FL SMALL BOWEL FOLLOW THROUGH     CLINICAL HISTORY:  Evaluate for bowel obstruction, Gastrografin;     TECHNIQUE:  Detailed evaluation of the small bowel was performed with spot compression under fluoroscopy, as well as with spot and overhead radiographs.     COMPARISON:  CT correlation     FINDINGS:  Small bowel mildly distended with contrast reaching the colon within 1 hour.     Fluoroscopic time 0.3 min, 13.7 mg Y dose 18 images     Impression:     Negative for high-grade obstruction        Electronically signed by: Rafaela Rivera  Date:                                            03/09/2023  Time:                                           20:23           Exam Ended: 03/09/23 16:39           Narrative & Impression  EXAMINATION:  XR ABDOMEN FLAT AND ERECT     CLINICAL HISTORY:  Follow-up after small-bowel follow-through;     COMPARISON:  A small-bowel follow-through study performed on 03/09/2023.     FINDINGS:  The majority of the contrast seen on the previous examination has passed through the gastrointestinal system.  There are mildly dilated loops of small bowel.  One is projected over the right side of the abdomen and has a diameter of 3.6 cm.  There is no pneumoperitoneum.  There are surgical changes associated with a prior sternotomy.  There is partial visualization of a cardiac pacemaker.     Impression:     1. The majority of the contrast seen on the previous examination has passed through  the gastrointestinal system. There are mildly dilated loops of small bowel. One is projected over the right side of the abdomen and has a diameter of 3.6 cm.  This is characteristic of a partial small bowel obstruction.  2. Surgical changes        Electronically signed by: Josué Ndiaye MD  Date:                                            03/10/2023  Time:                                           08:55    Assessment/Plan:     * SBO (small bowel obstruction)  Small-bowel follow-through showed contrast in the colon within an hour.  Patient has had multiple bowel movements.  Repeat x-rays show all contrast is essentially in the colon.  There was 1 dilated loop of small bowel that is concerning for a partial bowel obstruction although this may be a mild obstruction adjust residual dilated loops of bowel.  The patient is anxious to go home as is his wife's birthday.      At this point the patient can be given a regular diet.  Will need to discuss with the patient's the x-ray findings and the potential for recurrence of symptoms.  The patient desires to be discharged and lunch she is tolerated he can be discharged home with the understanding that if he develops abdominal pain, abdominal distension, nausea, vomiting, or if the hernia becomes more protuberant should follow up in the emergency room.      Discussion with the patient dictated under separate cover    Chronic combined systolic and diastolic congestive heart failure  Cardiology consultation   Repeat echocardiogram.  If he continues to have a severely depressed ejection fraction in the 15-20% range I would only recommend surgery if it is necessary for a life-saving procedure      Echo    Interpretation Summary  · The left ventricle is mildly enlarged with severely decreased systolic function.  · The estimated ejection fraction is 15 - 20%.  · Grade II left ventricular diastolic dysfunction.  · Moderate right ventricular enlargement with severely reduced right  ventricular systolic function.  · Severe left atrial enlargement.  · Severe right atrial enlargement.  · There is a 23 mm Freire II porcine bioprosthetic aortic valve present. There is trivial central aortic insufficiency present.  · The aortic valve mean gradient is 6 mmHg with a dimensionless index of 0.27.  · Moderate-to-severe mitral regurgitation.  · Moderate to severe tricuspid regurgitation.  · Moderate pulmonic regurgitation.  · The estimated PA systolic pressure is greater than 54 mmHg.  · There is pulmonary hypertension.  .  BNP 3000    Hypertension  Management per Medicine        Andrew Hill MD  General Surgery  [facility]

## 2023-03-10 NOTE — PLAN OF CARE
[facility]  Initial Discharge Assessment       Primary Care Provider: Srini Scott MD    Admission Diagnosis: Small bowel obstruction [K56.609]  SBO (small bowel obstruction) [K56.609]  Pre-operative clearance [Z01.818]  Preop cardiovascular exam [Z01.810]  Chronic combined systolic and diastolic congestive heart failure [I50.42]  Chest pain [R07.9]    Admission Date: 3/9/2023  Expected Discharge Date:     Discharge Barriers Identified: None    Payor: MEDICARE / Plan: MEDICARE PART A & B / Product Type: Government /     Extended Emergency Contact Information  Primary Emergency Contact: Rashi Chu  Mobile Phone: [phone]  Relation: Son  Secondary Emergency Contact: Chong Chu  Mobile Phone: [phone]  Relation: Spouse  Preferred language: English   needed? No    Discharge Plan A: Home with family         [id number] - [place], [place] - [address] AT [facility] OF [address]  [address]  [place] [address]  Phone: [phone] Fax: [phone]      Initial Assessment (most recent)       Adult Discharge Assessment - 03/10/23 0904          Discharge Assessment    Assessment Type Discharge Planning Assessment     Confirmed/corrected address, phone number and insurance Yes     Confirmed Demographics Correct on Facesheet     Source of Information patient     Reason For Admission SBO     People in Home spouse     Facility Arrived From: home     Do you expect to return to your current living situation? Yes     Do you have help at home or someone to help you manage your care at home? Yes     Who are your caregiver(s) and their phone number(s)? spouse and son     Prior to hospitilization cognitive status: Alert/Oriented     Current cognitive status: Alert/Oriented     Walking or Climbing Stairs ambulation difficulty, requires equipment     Mobility Management cane     Equipment Currently Used at Home cane, straight     Readmission within 30 days? No     Patient currently  being followed by outpatient case management? No     Do you currently have service(s) that help you manage your care at home? No     Do you take prescription medications? Yes     Do you have prescription coverage? Yes     Do you have any problems affording any of your prescribed medications? No     Is the patient taking medications as prescribed? yes     Who is going to help you get home at discharge? daughter from [employer] is in town and will provide transport     How do you get to doctors appointments? car, drives self     Are you on dialysis? No     Discharge Plan A Home with family     DME Needed Upon Discharge  none     Discharge Plan discussed with: Patient     Discharge Barriers Identified None                   Anticipated DC Dispo: home with family  Prior Level of Function: independent  PCP: Dr Scott ([facility])  Comments:  CM met with patient at bedside to introduce role and discuss d/c planning. Patient is independent, lives with spouse. Daughter to provide transport home. No identified CM needs at this time, will continue to follow.

## 2023-03-10 NOTE — PLAN OF CARE
Pt AAOx4. VSS. Pt remained free of falls this shift. No complaints of pain or discomfort. Pt is natalya on monitor. Hourly rounding completed. Pt instructed to call for assistance. POC reviewed. Pt verbalized understanding.

## 2023-03-10 NOTE — PLAN OF CARE
[facility]  Discharge Final Note    Primary Care Provider: Srini Scott MD    Expected Discharge Date: 3/10/2023    Final Discharge Note (most recent)       Final Note - 03/10/23 1451          Final Note    Assessment Type Final Discharge Note     Anticipated Discharge Disposition Home or Self Care        Post-Acute Status    Discharge Delays None known at this time                   Contact Info       Srini Scott MD   Specialty: Internal Medicine   Relationship: PCP - General    [address] [place] [id number]   Phone: [phone]       Next Steps: Follow up    Instructions: As needed    Andrew Hill MD   Specialty: General Surgery    [address]  [place] [address]   Phone: [phone]       Next Steps: Follow up          Sw attempted to schedule hospital follow up with PCP, Dr. Scott at [facility]; the office closed at 2:30 and Sw called at 2:50.     Pt to schedule PCP appointment, as needed; pt follows [facility].

## 2023-03-10 NOTE — DISCHARGE INSTRUCTIONS
I recommend that you eat smaller lighter meals over the weekend.  If you develop any abdominal pain, nausea, vomiting or you feel bloated please return to the emergency room.  If her hernia bulges out more than usual please return to the emergency room.      If you have any questions or concerns you can contact our office    Our office phone numbers are  [phone] and

## 2023-03-10 NOTE — DISCHARGE SUMMARY
[facility] Medicine  Discharge Summary      Patient Name: Edmund Chu  MRN: [id number]  TALHA: [id number]  Patient Class: IP- Inpatient  Admission Date: 3/9/2023  Hospital Length of Stay: 1 days  Discharge Date and Time:  03/10/2023 2:49 PM  Attending Physician: Lyndsay Mora DO   Discharging Provider: Lyndsay Mora DO  Primary Care Provider: Srini Scott MD    Primary Care Team: Networked reference to record PCT     HPI:   The patient is a 81 y.o. 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 Pt then had a vomiting episode on arrival to ED. Pt reports constipation symptoms x 2 weeks requiring his bidet to have a BM-last BM was yesterday after using bidet but very small. No flatus since then. Denies nausea or any further vomiting     In the ED, BP elevated 167/100, labs showed normal WBC, Na 133, K 5.5, Ca 10.8, Alk phos 147, T bili 1.2. UA showed 3+ glucose, +1 ketones   CT abd showed normal gallbladder, trace ascites, SBO, fluid filled stomach, wide based left anterior abdominal wall hernia filled with a loop of dilated small bowel although this does not appear to be causing obstruction.  There is a left inguinal hernia filled with a loop of colon, which also does not appear to represent a source of obstruction.     ED provider discussed car with General Surgery who recommended NPO and cardiology consult    Pt requested Ativan for anxiety          * No surgery found *      Hospital Course:   General surgery consulted and recommended Gastrografin small-bowel follow-through.  Given patient's known reduced EF, Cardiology was consulted for possible preoperative cardiovascular evaluation.  Patient had return of bowel function and was  able to have BMs.  Initially wanted to be discharged home immediately.  However was agreeable to stay to have follow-up x-ray done on 3/10/2023.  Follow-up abdominal x-ray showed contrast in colon however still has 1 dilated loop of bowel.  He is eager to be discharged home acid is his wife's birthday.  Surgery discussed with patient that his symptoms may improve or return at home.  Patient requested to try a regular diet and would like to be discharged home if he can tolerate it.  He agreed to return to the emergency room if his symptoms returned.  Patient able tolerate vegetarian diet with no pain, nausea or vomiting.  Discharge instructions discussed with patient.  Stable for discharge home at this time.     Goals of Care Treatment Preferences:  Code Status: Full Code      Consults:   Consults (From admission, onward)          Status Ordering Provider     Inpatient consult to Cardiology  Once        Provider:  Tristan Cabral MD    Completed KIM ZENDEJAS            No new Assessment & Plan notes have been filed under this hospital service since the last note was generated.  Service: Hospital Medicine    Final Active Diagnoses:    Diagnosis Date Noted POA    PRINCIPAL PROBLEM:  SBO (small bowel obstruction) [K56.609] 03/09/2023 Yes    Hyperkalemia [E87.5] 03/09/2023 Yes    Hypercalcemia [E83.52] 03/09/2023 Yes    CKD (chronic kidney disease), stage III [N18.30] 03/09/2023 Yes    Anxiety [F41.9] 03/09/2023 Yes    Pre-operative clearance [Z01.818] 03/09/2023 Not Applicable    ICD (implantable cardioverter-defibrillator) in place [Z95.810] 12/12/2022 Yes    Chronic combined systolic and diastolic congestive heart failure [I50.42] 09/26/2022 Yes    S/P AVR (aortic valve replacement) [Z95.2] 09/26/2022 Not Applicable    Hypertension [I10] 07/27/2022 Yes      Problems Resolved During this Admission:       Discharged Condition: stable    Disposition: Home or Self Care    Follow Up:   Follow-up Information        Srini Scott MD Follow up.    Specialty: Internal Medicine  Contact information:  [address]  [place] [address] [phone]               Andrew Hill MD Follow up.    Specialty: General Surgery  Contact information:  [address]  [place] [address] [phone]                           Patient Instructions:      Ambulatory referral/consult to [facility]   Standing Status: Future   Referral Priority: Routine Referral Type: Consultation   Referral Reason: Specialty Services Required   Number of Visits Requested: 1     Diet Cardiac     Notify your health care provider if you experience any of the following:  temperature >100.4     Notify your health care provider if you experience any of the following:  persistent nausea and vomiting or diarrhea     Notify your health care provider if you experience any of the following:  severe uncontrolled pain     Activity as tolerated       Significant Diagnostic Studies: Labs:   CMP   Recent Labs   Lab 03/09/23  0127 03/09/23  0518 03/10/23  0616   * 134* 140   K 5.5* 4.9 4.5   CL 97 101 107   CO2 21* 20* 20*   * 98 76   BUN 20 20 27*   CREATININE 1.3 1.1 1.2   CALCIUM 10.8* 9.4 9.4   PROT 9.7* 7.5 7.5   ALBUMIN 3.9 2.9* 3.1*   BILITOT 1.2* 0.9 1.0   ALKPHOS 147* 113 106   AST 23 19 19   ALT 18 15 13   ANIONGAP 15 13 13   , CBC   Recent Labs   Lab 03/09/23  0127 03/09/23  0518 03/10/23  0616   WBC 9.69 7.95 4.24   HGB 14.9 12.9* 13.2*   HCT 43.8 37.5* 40.2    211 196    and All labs within the past 24 hours have been reviewed    Pending Diagnostic Studies:       None           Medications:  Reconciled Home Medications:      Medication List        CONTINUE taking these medications      coenzyme Q10 100 mg capsule  Take 100 mg by mouth once daily.     colchicine 0.6 mg tablet  Commonly known as: COLCRYS  Take 0.6 mg by mouth once daily.     dapagliflozin 10 mg tablet  Commonly known as: FARXIGA  Take 10 mg by mouth  once daily.     LORazepam 0.5 MG tablet  Commonly known as: ATIVAN  Take 1 mg by mouth 2 (two) times daily.     olmesartan 40 MG tablet  Commonly known as: BENICAR  Take 20 mg by mouth once daily.     spironolactone 25 MG tablet  Commonly known as: ALDACTONE  Take 1 tablet (25 mg total) by mouth once daily.     torsemide 20 MG Tab  Commonly known as: DEMADEX  Take 30 mg by mouth once daily.     tumeric-ging-olive-oreg-capryl 100 mg-150 mg- 50 mg-150 mg Cap  Take 1 capsule by mouth once daily.     zolpidem 10 mg Tab  Commonly known as: AMBIEN  Take 5 mg by mouth nightly as needed.              Indwelling Lines/Drains at time of discharge:   Lines/Drains/Airways       None                   Time spent on the discharge of patient: 45 minutes         Lyndsay Mora DO  Department of Hospital Medicine  [facility] - Med S[facility]

## 2023-03-10 NOTE — SUBJECTIVE & OBJECTIVE
Interval History:  Multiple bowel movements.  X-rays still showed dilated loops of small bowel concerning for partial bowel obstruction likely low-grade.  Patient is anxious to go home.  Will advance to a regular diet.      Will discuss x-ray findings implication with the patient and dictated under separate cover.    Medications:  Continuous Infusions:   sodium chloride 0.9% 75 mL/hr at 03/10/23 0550     Scheduled Meds:   enoxaparin  40 mg Subcutaneous Daily     PRN Meds:dextrose 10%, dextrose 10%, glucagon (human recombinant), glucose, glucose, hydrALAZINE, LORazepam, morphine, naloxone, ondansetron, prochlorperazine, sodium chloride 0.9%, zolpidem     Review of patient's allergies indicates:   Allergen Reactions    Sacubitril-valsartan Hives and Swelling    Amlodipine Edema     Ankle    Lactose Other (See Comments)     GI distress    Lisinopril Other (See Comments)    Mefloquine Hives and Itching     Anti malarial       Objective:     Vital Signs (Most Recent):  Temp: 97.9 °F (36.6 °C) (03/10/23 0422)  Pulse: 77 (03/10/23 0500)  Resp: 18 (03/10/23 0422)  BP: (!) 163/75 (03/10/23 0422)  SpO2: 99 % (03/10/23 0422)   Vital Signs (24h Range):  Temp:  [96.6 °F (35.9 °C)-98 °F (36.7 °C)] 97.9 °F (36.6 °C)  Pulse:  [52-77] 77  Resp:  [16-21] 18  SpO2:  [97 %-100 %] 99 %  BP: (129-166)/(67-79) 163/75     Weight: 65.3 kg (143 lb 15.4 oz)  Body mass index is 21.26 kg/m².    Intake/Output - Last 3 Shifts         03/08 0700  03/09 0659 03/09 0700  03/10 0659 03/10 0700 03/11 0659           Urine Occurrence  1 x             Physical Exam  Vitals and nursing note reviewed.   Constitutional:       General: He is not in acute distress.     Appearance: He is well-developed. He is ill-appearing (Chronically).      Comments: Very thin   HENT:      Head: Normocephalic and atraumatic.   Eyes:      General: No scleral icterus.     Pupils: Pupils are equal, round, and reactive to light.   Neck:      Thyroid: No thyromegaly.       Vascular: No JVD.      Trachea: No tracheal deviation.   Cardiovascular:      Rate and Rhythm: Normal rate and regular rhythm.      Heart sounds: Normal heart sounds.   Pulmonary:      Effort: Pulmonary effort is normal.      Breath sounds: Normal breath sounds.   Abdominal:      General: Abdomen is flat. Bowel sounds are normal. There is no distension.      Palpations: Abdomen is soft. There is no mass.      Tenderness: There is no abdominal tenderness. There is no guarding or rebound.      Hernia: Hernia: reducible wide mouth hernia.   Musculoskeletal:         General: Normal range of motion.      Cervical back: Normal range of motion and neck supple.   Lymphadenopathy:      Cervical: No cervical adenopathy.   Skin:     General: Skin is warm and dry.   Neurological:      Mental Status: He is alert and oriented to person, place, and time.   Psychiatric:         Behavior: Behavior normal.         Thought Content: Thought content normal.         Judgment: Judgment normal.       Significant Labs:  I have reviewed all pertinent lab results within the past 24 hours.  CBC:   Recent Labs   Lab 03/10/23  0616   WBC 4.24   RBC 4.19*   HGB 13.2*   HCT 40.2      MCV 96   MCH 31.5*   MCHC 32.8     BMP:   Recent Labs   Lab 03/10/23  0616   GLU 76      K 4.5      CO2 20*   BUN 27*   CREATININE 1.2   CALCIUM 9.4   MG 2.2       Significant Diagnostics:  I have reviewed all pertinent imaging results/findings within the past 24 hours.  Small-bowel follow-through and abdominal x-ray    Reading Physician Reading Date Result Priority   Rafaela Rivera MD  [phone] 3/9/2023 STAT     Narrative & Impression  EXAMINATION:  FL SMALL BOWEL FOLLOW THROUGH     CLINICAL HISTORY:  Evaluate for bowel obstruction, Gastrografin;     TECHNIQUE:  Detailed evaluation of the small bowel was performed with spot compression under fluoroscopy, as well as with spot and overhead radiographs.     COMPARISON:  CT correlation      FINDINGS:  Small bowel mildly distended with contrast reaching the colon within 1 hour.     Fluoroscopic time 0.3 min, 13.7 mg Y dose 18 images     Impression:     Negative for high-grade obstruction        Electronically signed by: Rafaela Rivera  Date:                                            03/09/2023  Time:                                           20:23           Exam Ended: 03/09/23 16:39           Narrative & Impression  EXAMINATION:  XR ABDOMEN FLAT AND ERECT     CLINICAL HISTORY:  Follow-up after small-bowel follow-through;     COMPARISON:  A small-bowel follow-through study performed on 03/09/2023.     FINDINGS:  The majority of the contrast seen on the previous examination has passed through the gastrointestinal system.  There are mildly dilated loops of small bowel.  One is projected over the right side of the abdomen and has a diameter of 3.6 cm.  There is no pneumoperitoneum.  There are surgical changes associated with a prior sternotomy.  There is partial visualization of a cardiac pacemaker.     Impression:     1. The majority of the contrast seen on the previous examination has passed through the gastrointestinal system. There are mildly dilated loops of small bowel. One is projected over the right side of the abdomen and has a diameter of 3.6 cm.  This is characteristic of a partial small bowel obstruction.  2. Surgical changes        Electronically signed by: Josué Ndiaye MD  Date:                                            03/10/2023  Time:                                           08:55

## 2023-03-10 NOTE — PLAN OF CARE
Orders acknowledged. IV discontinued. VS stable for discharge. Telemetry discontinued. Pt free of falls and injury during shift. Education resolved. Care plans met.

## 2023-03-10 NOTE — ASSESSMENT & PLAN NOTE
Small-bowel follow-through showed contrast in the colon within an hour.  Patient has had multiple bowel movements.  Repeat x-rays show all contrast is essentially in the colon.  There was 1 dilated loop of small bowel that is concerning for a partial bowel obstruction although this may be a mild obstruction adjust residual dilated loops of bowel.  The patient is anxious to go home as is his wife's birthday.      At this point the patient can be given a regular diet.  Will need to discuss with the patient's the x-ray findings and the potential for recurrence of symptoms.  The patient desires to be discharged and lunch she is tolerated he can be discharged home with the understanding that if he develops abdominal pain, abdominal distension, nausea, vomiting, or if the hernia becomes more protuberant should follow up in the emergency room.      Discussion with the patient dictated under separate cover

## 2023-03-10 NOTE — PROGRESS NOTES
Patient was revisited.  X-ray findings discussed with him, contrast in colon but 1 dilated loop of bowel.      The patient would like to be discharged as it is his wife's birthday.      I explained to the patient that he would either improve at home or his symptoms were returned.  I discussed that if he develops abdominal pain, nausea vomiting or the hernia becomes more protuberant than he should return to the emergency room.      He would like to try a regular diet and if tolerated he would like to be discharged and he will follow-up with the emergency room if his symptoms returned.

## 2023-03-10 NOTE — CARE UPDATE
Patient seen and examined. Plan of care discussed. Patient anxious for discharge as he had several bowel movements. Discussed SBFT not resulted. Continue NPO for now as possible bowel obstruction. Pain markedly improved. Explained to patient and his daughter that he may need imaging in the morning. SBFT timing is different for every person. Patient calmed down and apologized. Agreeable to going to the floor.

## 2023-03-12 NOTE — HOSPITAL COURSE
General surgery consulted and recommended Gastrografin small-bowel follow-through.  Given patient's known reduced EF, Cardiology was consulted for possible preoperative cardiovascular evaluation.  Patient had return of bowel function and was able to have BMs.  Initially wanted to be discharged home immediately.  However was agreeable to stay to have follow-up x-ray done on 3/10/2023.  Follow-up abdominal x-ray showed contrast in colon however still has 1 dilated loop of bowel.  He is eager to be discharged home acid is his wife's birthday.  Surgery discussed with patient that his symptoms may improve or return at home.  Patient requested to try a regular diet and would like to be discharged home if he can tolerate it.  He agreed to return to the emergency room if his symptoms returned.  Patient able tolerate vegetarian diet with no pain, nausea or vomiting.  Discharge instructions discussed with patient.  Stable for discharge home at this time.

## 2023-03-13 ENCOUNTER — LAB VISIT (OUTPATIENT)
Dept: LAB | Facility: HOSPITAL | Age: 83
End: 2023-03-13
Attending: INTERNAL MEDICINE
Payer: MEDICARE

## 2023-03-13 DIAGNOSIS — I50.9 CONGESTIVE HEART FAILURE, UNSPECIFIED HF CHRONICITY, UNSPECIFIED HEART FAILURE TYPE: ICD-10-CM

## 2023-03-13 LAB
ANION GAP SERPL CALC-SCNC: 8 MMOL/L (ref 8–16)
BNP SERPL-MCNC: 2368 PG/ML (ref 0–99)
BUN SERPL-MCNC: 12 MG/DL (ref 8–23)
CALCIUM SERPL-MCNC: 9.3 MG/DL (ref 8.7–10.5)
CHLORIDE SERPL-SCNC: 103 MMOL/L (ref 95–110)
CO2 SERPL-SCNC: 25 MMOL/L (ref 23–29)
CREAT SERPL-MCNC: 0.9 MG/DL (ref 0.5–1.4)
EST. GFR  (NO RACE VARIABLE): >60 ML/MIN/1.73 M^2
GLUCOSE SERPL-MCNC: 76 MG/DL (ref 70–110)
POTASSIUM SERPL-SCNC: 4.9 MMOL/L (ref 3.5–5.1)
SODIUM SERPL-SCNC: 136 MMOL/L (ref 136–145)

## 2023-03-13 PROCEDURE — 80048 BASIC METABOLIC PNL TOTAL CA: CPT | Performed by: INTERNAL MEDICINE

## 2023-03-13 PROCEDURE — 83880 ASSAY OF NATRIURETIC PEPTIDE: CPT | Performed by: INTERNAL MEDICINE

## 2023-03-13 PROCEDURE — 36415 COLL VENOUS BLD VENIPUNCTURE: CPT | Performed by: INTERNAL MEDICINE

## 2023-03-15 ENCOUNTER — PES CALL (OUTPATIENT)
Dept: ADMINISTRATIVE | Facility: CLINIC | Age: 83
End: 2023-03-15
Payer: MEDICARE

## 2023-03-15 NOTE — PROGRESS NOTES
See comments below and call patient to discuss.   Please close encounter when done -- no need to route back to me.  Thanks  BNP lower but still very high  If he can tolerate it, I'd increase his BB dose

## 2023-03-16 ENCOUNTER — TELEPHONE (OUTPATIENT)
Dept: CARDIOLOGY | Facility: CLINIC | Age: 83
End: 2023-03-16
Payer: MEDICARE

## 2023-03-16 ENCOUNTER — OFFICE VISIT (OUTPATIENT)
Dept: HOME HEALTH SERVICES | Facility: CLINIC | Age: 83
End: 2023-03-16
Payer: MEDICARE

## 2023-03-16 VITALS
DIASTOLIC BLOOD PRESSURE: 72 MMHG | OXYGEN SATURATION: 99 % | HEART RATE: 62 BPM | SYSTOLIC BLOOD PRESSURE: 136 MMHG | RESPIRATION RATE: 18 BRPM

## 2023-03-16 DIAGNOSIS — Z09 HOSPITAL DISCHARGE FOLLOW-UP: Primary | ICD-10-CM

## 2023-03-16 DIAGNOSIS — I50.42 CHRONIC COMBINED SYSTOLIC AND DIASTOLIC CONGESTIVE HEART FAILURE: ICD-10-CM

## 2023-03-16 DIAGNOSIS — K56.609 SBO (SMALL BOWEL OBSTRUCTION): ICD-10-CM

## 2023-03-16 DIAGNOSIS — I10 PRIMARY HYPERTENSION: ICD-10-CM

## 2023-03-16 PROCEDURE — 99349 HOME/RES VST EST MOD MDM 40: CPT | Mod: S$GLB,,,

## 2023-03-16 PROCEDURE — 99349 PR HOME VISIT,ESTAB PATIENT,LEVEL III: ICD-10-PCS | Mod: S$GLB,,,

## 2023-03-16 RX ORDER — CARVEDILOL 12.5 MG/1
25 TABLET ORAL 2 TIMES DAILY WITH MEALS
COMMUNITY

## 2023-03-16 NOTE — PROGRESS NOTES
[facility]  Transition of Care Home Visit    Visit Date: 3/16/2023  Encounter Provider: Krunal Rizzo   PCP:  Srini Scott MD    PRESENTING HISTORY      Patient ID: Edmund Chu is a 82 y.o. male.    Consult Requested By:  Dr. Lyndsay Mora  Reason for Consult:  Hospital Follow Up.    Edmund is being seen at home due to being seen at home due to physical debility that presents a taxing effort to leave the home, to mitigate high risk of hospital readmission and/or due to the limited availability of reliable or safe options for transportation to the point of access to the provider. Prior to treatment on this visit the chart was reviewed and patient verbal consent was obtained.      Chief Complaint: Follow-up        History of Present Illness: Mr. Edmund Chu is a 82 y.o. male who was recently admitted to the hospital.    3/9-3/10 admission with a diagnosis of CHF and HTN  presented to the Emergency Department for evaluation of abd pain which onset several hours PTA. Symptoms are constant and moderate in severity. Pt had a hernia mesh surgery in 2009. He is now c/o abd pain. He took nexium for heart burn. The pain sometimes radiates to groin. Pt states he had a small BM several days ago. Hernia noted in triage. Pt states he had an emesis episode as well. Pt has been having rhinorrhea and took an at-home COVID test which came out positive. Patient denies any dysuria, CP, SOB, dizziness, fever, and all other sxs at this time. No further complaints or concerns at this time.      ___________________________________________________________________    Today:    HPI:  Patient is a 81 yo male being seen today for a post hospital discharge assessment following a recent admit for evaluation of abd pain. Patient presented with a diagnosis of CHF and HTN.  He was hospitalized 3/9-3/10 with a SBO. See hospital course.    Patient is found in their home today, in no acute distress and agreeable to this visit.  His wife is  present during the visit. Patient reports no reoccurrence of symptoms in which prompted the hospital stay.  Feels much better since discharge.  Eating ok with multiple bowel movements since hospital. Denies abdominal pain/distention and N/V. He reports some mild pain in his right foot due to gout.  Swelling present with some erythema.  Reports improvement in swelling, erythema, and pain in right foot.  Patient reports compliance with all medications.  Denies any dysuria, CP, SOB, dizziness, fever, and all other sxs at this time. No further complaints or concerns at this time. Reports he had follow up with primary since hospital discharge.  Questions elicited. Time allowed for questions, all issues addressed. Contact info given for any concerns or changes.            Review of Systems   Constitutional: Negative.    HENT: Negative.     Eyes: Negative.    Respiratory: Negative.  Negative for chest tightness.    Cardiovascular:  Positive for leg swelling.   Gastrointestinal: Negative.  Negative for abdominal distention and abdominal pain.   Endocrine: Negative.    Genitourinary: Negative.    Musculoskeletal:  Positive for arthralgias and joint swelling.   Allergic/Immunologic: Negative.    Neurological: Negative.    Hematological: Negative.    Psychiatric/Behavioral: Negative.  Negative for agitation.    All other systems reviewed and are negative.    Assessments:  Environmental: Patient lives in a single story home.  There is adequate lighting and the temperature is comfortable.    Functional Status: Ambulates independently.   Safety: Fall precautions.   Nutritional: Adequate food in the home.   Home Health/DME/Supplies: No home health    PAST HISTORY:     Past Medical History:   Diagnosis Date    Aortic valve prosthesis present     CHF (congestive heart failure)     CKD (chronic kidney disease) stage 3, GFR 30-59 ml/min     Colon cancer     Hypertension     PAF (paroxysmal atrial fibrillation)        Past Surgical  History:   Procedure Laterality Date    AORTIC VALVE REPLACEMENT  2016    COLON RESECTION         Family History   Problem Relation Age of Onset    Hypertension Mother     Colon cancer Father        Social History     Socioeconomic History    Marital status:    Tobacco Use    Smoking status: Former    Smokeless tobacco: Never   Substance and Sexual Activity    Alcohol use: Yes    Drug use: Never       MEDICATIONS & ALLERGIES:     Current Outpatient Medications on File Prior to Visit   Medication Sig Dispense Refill    coenzyme Q10 100 mg capsule Take 100 mg by mouth once daily.      colchicine (COLCRYS) 0.6 mg tablet Take 0.6 mg by mouth once daily.      dapagliflozin (FARXIGA) 10 mg tablet Take 10 mg by mouth once daily.      LORazepam (ATIVAN) 0.5 MG tablet Take 1 mg by mouth 2 (two) times daily.      olmesartan (BENICAR) 40 MG tablet Take 20 mg by mouth once daily.      spironolactone (ALDACTONE) 25 MG tablet Take 1 tablet (25 mg total) by mouth once daily. 90 tablet 3    torsemide (DEMADEX) 20 MG Tab Take 30 mg by mouth once daily.      tumeric-ging-olive-oreg-capryl 100 mg-150 mg- 50 mg-150 mg Cap Take 1 capsule by mouth once daily.      zolpidem (AMBIEN) 10 mg Tab Take 5 mg by mouth nightly as needed.       No current facility-administered medications on file prior to visit.        Review of patient's allergies indicates:   Allergen Reactions    Sacubitril-valsartan Hives and Swelling    Amlodipine Edema     Ankle    Lactose Other (See Comments)     GI distress    Lisinopril Other (See Comments)    Mefloquine Hives and Itching     Anti malarial         OBJECTIVE:     Vital Signs:  Vitals:    03/16/23 1356   BP: 136/72   Pulse: 62   Resp: 18     There is no height or weight on file to calculate BMI.     Physical Exam:  Physical Exam  Vitals reviewed.   Constitutional:       General: He is not in acute distress.     Appearance: Normal appearance. He is well-developed and normal weight.   HENT:      Head:  Normocephalic and atraumatic.      Nose: Nose normal.      Mouth/Throat:      Mouth: Mucous membranes are dry.      Pharynx: Oropharynx is clear.   Eyes:      Pupils: Pupils are equal, round, and reactive to light.   Cardiovascular:      Rate and Rhythm: Normal rate and regular rhythm.      Pulses: Normal pulses.      Heart sounds: Normal heart sounds.   Pulmonary:      Effort: Pulmonary effort is normal.      Breath sounds: Normal breath sounds.   Abdominal:      General: Bowel sounds are normal.      Palpations: Abdomen is soft.   Musculoskeletal:         General: Swelling and tenderness present. Normal range of motion.      Cervical back: Normal range of motion and neck supple.      Right lower leg: Edema present.   Skin:     General: Skin is warm and dry.      Findings: Erythema present.   Neurological:      General: No focal deficit present.      Mental Status: He is alert and oriented to person, place, and time. Mental status is at baseline.   Psychiatric:         Mood and Affect: Mood normal.         Behavior: Behavior normal.         Thought Content: Thought content normal.         Judgment: Judgment normal.       Laboratory  Lab Results   Component Value Date    WBC 4.24 03/10/2023    HGB 13.2 (L) 03/10/2023    HCT 40.2 03/10/2023    MCV 96 03/10/2023     03/10/2023     Lab Results   Component Value Date    INR 1.2 10/06/2022    INR 1.1 09/27/2022     Lab Results   Component Value Date    HGBA1C 5.9 (H) 03/09/2023     No results for input(s): POCTGLUCOSE in the last 72 hours.    Diagnostic Results:      TRANSITION OF CARE:     [facility] Contact Note: 3/15    Family and/or Caretaker present at visit?  Yes.  Diagnostic tests reviewed/disposition: No diagnosic tests pending after this hospitalization.  Disease/illness education: Take all medication as prescribed. Activity as tolerated. Keep all upcoming appts.   Home health/community services discussion/referrals: Patient does not have home health  established from hospital visit.  They do not need home health.  If needed, we will set up home health for the patient.   Establishment or re-establishment of referral orders for community resources: No other necessary community resources.   Discussion with other health care providers: No discussion with other health care providers necessary.     Transition of Care Visit:  I have reviewed and updated the history and problem list.  I have reconciled the medication list.  I have discussed the hospitalization and current medical issues, prognosis and plans with the patient/family.  I  spent more than 50% of time discussing the care with the patient/family.  Total Face-to-Face Encounter: 60 minutes.    Medications Reconciliation:   I have reconciled the patient's home medications and discharge medications with the patient/family. I have updated all changes.  Refer to After-Visit Medication List.    ASSESSMENT & PLAN:     HIGH RISK CONDITION(S):      Problem List Items Addressed This Visit          Cardiac/Vascular    Hypertension    Current Assessment & Plan     Stable  Continue current plan  Monitor         Chronic combined systolic and diastolic congestive heart failure    Current Assessment & Plan     Recent BNP 3010  Last ECHO 15-20% EF  Followed by cardiology, next appt 4/17  Heart failure diet  Continue plan  Monitor              GI    SBO (small bowel obstruction)    Current Assessment & Plan     CT revealed SBO  SBFT revealed contrast reaching the colon within 1 hour, negative for high grade obstruction  Dr. Garcia consulted during inpatient  Denies Abd pain/distention, N/V  Continue current plan  Monitor            Other Visit Diagnoses       Hospital discharge follow-up    -  Primary             Were controlled substances prescribed?  No    Instructions for the patient:  - Continue all medications, treatments and therapies as ordered.   - Follow all instructions, recommendations as discussed.  - Maintain  Safety Precautions at all times.  - Attend all medical appointments as scheduled.  - For worsening symptoms: call Primary Care Physician or Nurse Practitioner.  - For emergencies, call 911 or immediately report to the nearest emergency room.  - Limit Risks of environmental exposure to coronavirus/COVID-19 as discussed including: social distancing, hand hygiene, and limiting departures from the home for necessities only.     Scheduled Follow-up :  Future Appointments   Date Time Provider Department Center   3/28/2023  8:30 AM [facility]UL[facility], [facility]   3/28/2023  9:15 AM [facility]UL[facility], [facility]   3/28/2023 10:00 AM Helder Noyola MD ON [facility]   4/17/2023  1:20 PM Tere Johns MD ON CARDIO [facility]   4/29/2023 10:00 AM HOME MONITOR DEVICE CHECK [facility] [place]   5/1/2023  2:20 PM Amos Grider MD ON ARR [facility]   8/7/2023  8:40 AM [facility]       After Visit Medication List :     Medication List            Accurate as of March 16, 2023  2:01 PM. If you have any questions, ask your nurse or doctor.                CONTINUE taking these medications      carvediloL 12.5 MG tablet  Commonly known as: COREG     coenzyme Q10 100 mg capsule     colchicine 0.6 mg tablet  Commonly known as: COLCRYS     dapagliflozin 10 mg tablet  Commonly known as: FARXIGA     LORazepam 0.5 MG tablet  Commonly known as: ATIVAN     olmesartan 40 MG tablet  Commonly known as: BENICAR     spironolactone 25 MG tablet  Commonly known as: ALDACTONE  Take 1 tablet (25 mg total) by mouth once daily.     torsemide 20 MG Tab  Commonly known as: DEMADEX     tumeric-ging-olive-oreg-capryl 100 mg-150 mg- 50 mg-150 mg Cap     zolpidem 10 mg Tab  Commonly known as: AMBIEN              Signature: Krunal Rizzo NP

## 2023-03-16 NOTE — ASSESSMENT & PLAN NOTE
Recent BNP 3010  Last ECHO 15-20% EF  Followed by cardiology, next appt 4/17  Heart failure diet  Continue plan  Monitor

## 2023-03-16 NOTE — ASSESSMENT & PLAN NOTE
CT revealed SBO  SBFT revealed contrast reaching the colon within 1 hour, negative for high grade obstruction  Dr. Garcia consulted during inpatient  Denies Abd pain/distention, N/V  Continue current plan  Monitor

## 2023-03-16 NOTE — TELEPHONE ENCOUNTER
Coreg 12.5 mg bid pt states that his spirolactone entresto and demendex is causing him to have the gout.    I have placed the medication back on his med card. Thanks pb    ----- Message from BAY Hernández sent at 3/16/2023  7:58 AM CDT -----  Kayla,     Is he taking any beta blocker? It is not on his med card?    Thanks  ----- Message -----  From: Ella Cardozo LPN  Sent: 3/15/2023   4:46 PM CDT  To: BAY Hernández    Please let me know how you wish to proceed thanks pb  ----- Message -----  From: Amos Grider MD  Sent: 3/15/2023   4:40 PM CDT  To: BAY Hernández, Ella Cardozo LPN    See comments below and call patient to discuss.   Please close encounter when done -- no need to route back to me.  Thanks  BNP lower but still very high  If he can tolerate it, I'd increase his BB dose

## 2023-03-20 ENCOUNTER — TELEPHONE (OUTPATIENT)
Dept: CARDIOLOGY | Facility: CLINIC | Age: 83
End: 2023-03-20
Payer: MEDICARE

## 2023-03-20 NOTE — TELEPHONE ENCOUNTER
Already addressed pb    Pt was notified of medication increase of his coreg. Advised that he can take his 12.5 mg tablet 2 tablets twice a day. Advised that when he finishes what he has this next prescription will be the 25 mg tablets and he is only to take one twice a day. Pt verbalized understanding pb      ----- Message from BAY Hernández sent at 3/16/2023  4:18 PM CDT -----  Please ask him to increase his coreg to 25 mg BID  ----- Message -----  From: Ella Cardozo LPN  Sent: 3/15/2023   4:46 PM CDT  To: BAY Hernández    Please let me know how you wish to proceed thanks pb  ----- Message -----  From: Amos Grider MD  Sent: 3/15/2023   4:40 PM CDT  To: BAY Hernández, Ella Cardozo LPN    See comments below and call patient to discuss.   Please close encounter when done -- no need to route back to me.  Thanks  BNP lower but still very high  If he can tolerate it, I'd increase his BB dose

## 2023-03-28 ENCOUNTER — LAB VISIT (OUTPATIENT)
Dept: LAB | Facility: HOSPITAL | Age: 83
End: 2023-03-28
Attending: INTERNAL MEDICINE
Payer: MEDICARE

## 2023-03-28 ENCOUNTER — OFFICE VISIT (OUTPATIENT)
Dept: PULMONOLOGY | Facility: CLINIC | Age: 83
End: 2023-03-28
Payer: MEDICARE

## 2023-03-28 ENCOUNTER — CLINICAL SUPPORT (OUTPATIENT)
Dept: PULMONOLOGY | Facility: CLINIC | Age: 83
End: 2023-03-28
Payer: MEDICARE

## 2023-03-28 VITALS — BODY MASS INDEX: 21.36 KG/M2 | WEIGHT: 144.19 LBS | HEIGHT: 69 IN

## 2023-03-28 VITALS
OXYGEN SATURATION: 99 % | DIASTOLIC BLOOD PRESSURE: 81 MMHG | RESPIRATION RATE: 18 BRPM | BODY MASS INDEX: 21.36 KG/M2 | HEART RATE: 68 BPM | SYSTOLIC BLOOD PRESSURE: 139 MMHG | HEIGHT: 69 IN | WEIGHT: 144.19 LBS

## 2023-03-28 DIAGNOSIS — R06.02 SOB (SHORTNESS OF BREATH): ICD-10-CM

## 2023-03-28 DIAGNOSIS — R63.8 OTHER SYMPTOMS AND SIGNS CONCERNING FOOD AND FLUID INTAKE: ICD-10-CM

## 2023-03-28 DIAGNOSIS — I50.42 CHRONIC COMBINED SYSTOLIC AND DIASTOLIC CONGESTIVE HEART FAILURE: ICD-10-CM

## 2023-03-28 DIAGNOSIS — Z95.2 S/P AVR (AORTIC VALVE REPLACEMENT): ICD-10-CM

## 2023-03-28 DIAGNOSIS — G47.61 PLMD (PERIODIC LIMB MOVEMENT DISORDER): ICD-10-CM

## 2023-03-28 DIAGNOSIS — G47.33 OSA (OBSTRUCTIVE SLEEP APNEA): ICD-10-CM

## 2023-03-28 DIAGNOSIS — I27.20 PULMONARY HYPERTENSION: Primary | ICD-10-CM

## 2023-03-28 DIAGNOSIS — G47.09 OTHER INSOMNIA: ICD-10-CM

## 2023-03-28 DIAGNOSIS — I10 PRIMARY HYPERTENSION: ICD-10-CM

## 2023-03-28 LAB — FERRITIN SERPL-MCNC: 446 NG/ML (ref 20–300)

## 2023-03-28 PROCEDURE — 94618 PULMONARY STRESS TESTING: ICD-10-PCS | Mod: 26,S$PBB,, | Performed by: INTERNAL MEDICINE

## 2023-03-28 PROCEDURE — 99999 PR PBB SHADOW E&M-EST. PATIENT-LVL IV: ICD-10-PCS | Mod: PBBFAC,,, | Performed by: INTERNAL MEDICINE

## 2023-03-28 PROCEDURE — 94726 PULM FUNCT TST PLETHYSMOGRAP: ICD-10-PCS | Mod: 26,S$PBB,, | Performed by: INTERNAL MEDICINE

## 2023-03-28 PROCEDURE — 82728 ASSAY OF FERRITIN: CPT | Performed by: INTERNAL MEDICINE

## 2023-03-28 PROCEDURE — 94729 DIFFUSING CAPACITY: CPT | Mod: 26,S$PBB,, | Performed by: INTERNAL MEDICINE

## 2023-03-28 PROCEDURE — 94729 PR C02/MEMBANE DIFFUSE CAPACITY: ICD-10-PCS | Mod: 26,S$PBB,, | Performed by: INTERNAL MEDICINE

## 2023-03-28 PROCEDURE — 94726 PLETHYSMOGRAPHY LUNG VOLUMES: CPT | Mod: 26,S$PBB,, | Performed by: INTERNAL MEDICINE

## 2023-03-28 PROCEDURE — 94729 DIFFUSING CAPACITY: CPT | Mod: PBBFAC

## 2023-03-28 PROCEDURE — 94618 PULMONARY STRESS TESTING: CPT | Mod: 26,S$PBB,, | Performed by: INTERNAL MEDICINE

## 2023-03-28 PROCEDURE — 99214 OFFICE O/P EST MOD 30 MIN: CPT | Mod: 25,S$PBB,, | Performed by: INTERNAL MEDICINE

## 2023-03-28 PROCEDURE — 94060 PR EVAL OF BRONCHOSPASM: ICD-10-PCS | Mod: 26,S$PBB,59, | Performed by: INTERNAL MEDICINE

## 2023-03-28 PROCEDURE — 99211 OFF/OP EST MAY X REQ PHY/QHP: CPT | Mod: PBBFAC

## 2023-03-28 PROCEDURE — 94060 EVALUATION OF WHEEZING: CPT | Mod: PBBFAC

## 2023-03-28 PROCEDURE — 99214 OFFICE O/P EST MOD 30 MIN: CPT | Mod: PBBFAC,27 | Performed by: INTERNAL MEDICINE

## 2023-03-28 PROCEDURE — 99999 PR PBB SHADOW E&M-EST. PATIENT-LVL IV: CPT | Mod: PBBFAC,,, | Performed by: INTERNAL MEDICINE

## 2023-03-28 PROCEDURE — 99214 PR OFFICE/OUTPT VISIT, EST, LEVL IV, 30-39 MIN: ICD-10-PCS | Mod: 25,S$PBB,, | Performed by: INTERNAL MEDICINE

## 2023-03-28 PROCEDURE — 94060 EVALUATION OF WHEEZING: CPT | Mod: 26,S$PBB,59, | Performed by: INTERNAL MEDICINE

## 2023-03-28 PROCEDURE — 94726 PLETHYSMOGRAPHY LUNG VOLUMES: CPT | Mod: PBBFAC

## 2023-03-28 PROCEDURE — 36415 COLL VENOUS BLD VENIPUNCTURE: CPT | Performed by: INTERNAL MEDICINE

## 2023-03-28 PROCEDURE — 94618 PULMONARY STRESS TESTING: CPT | Mod: PBBFAC

## 2023-03-28 PROCEDURE — 99999 PR PBB SHADOW E&M-EST. PATIENT-LVL I: CPT | Mod: PBBFAC,,,

## 2023-03-28 PROCEDURE — 99999 PR PBB SHADOW E&M-EST. PATIENT-LVL I: ICD-10-PCS | Mod: PBBFAC,,,

## 2023-03-28 RX ORDER — ISOSORBIDE DINITRATE 10 MG/1
10 TABLET ORAL
COMMUNITY
End: 2023-04-17

## 2023-03-28 RX ORDER — POTASSIUM CHLORIDE 750 MG/1
10 TABLET, EXTENDED RELEASE ORAL
COMMUNITY
End: 2023-04-17

## 2023-03-28 RX ORDER — DOXAZOSIN 2 MG/1
2 TABLET ORAL
COMMUNITY
End: 2023-04-17

## 2023-03-28 RX ORDER — ZOLPIDEM TARTRATE 5 MG/1
5 TABLET ORAL
COMMUNITY
End: 2023-04-17

## 2023-03-28 RX ORDER — ALLOPURINOL 100 MG/1
200 TABLET ORAL
COMMUNITY
Start: 2023-03-15 | End: 2023-04-17

## 2023-03-28 NOTE — ASSESSMENT & PLAN NOTE
Mild DIVYA, Moderate severity in REM  Given low EF  CPAP titration  Patient hesitant however agrees

## 2023-03-28 NOTE — PROCEDURES
"[facility] - Pulmonary Function  Six Minute Walk     SUMMARY     Ordering Provider: AMANDA Mei   Interpreting Provider: Dr. Key  Performing nurse/tech/RT: QUIQUE Vences RRT  Diagnosis: Shortness of Breath  Height: 5' 9" (175.3 cm)  Weight: 65.4 kg (144 lb 2.9 oz)  BMI (Calculated): 21.3   Patient Race:             Phase Oxygen Assessment Supplemental O2 Heart   Rate Blood Pressure [place] Dyspnea Scale Rating   Resting 99 % Room Air 65 bpm 139/81 1   Exercise        Minute        1 96 % Room Air 86 bpm     2 96 % Room Air 95 bpm     3 98 % Room Air 100 bpm     4 98 % Room Air 102 bpm     5 97 % Room Air 102 bpm     6  98 % Room Air 105 bpm 162/84 2   Recovery        Minute        1 97 % Room Air 89 bpm     2 99 % Room Air 74 bpm     3 99 % Room Air 73 bpm     4 99 % Room Air 74 bpm 157/87 1     Six Minute Walk Summary  6MWT Status: completed without stopping  Patient Reported: Dyspnea (knee pain)     Interpretation:  Did the patient stop or pause?: No                                         Total Time Walked (Calculated): 360 seconds  Final Partial Lap Distance (feet): 100 feet  Total Distance Meters (Calculated): 396.24 meters  Predicted Distance Meters (Calculated): 501.32 meters  Percentage of Predicted (Calculated): 79.04  Peak VO2 (Calculated): 15.87  Mets: 4.53  Has The Patient Had a Previous Six Minute Walk Test?: Yes       Previous 6MWT Results  Has The Patient Had a Previous Six Minute Walk Test?: Yes  Date of Previous Test: 10/06/22  Total Time Walked: 360 seconds  Total Distance (meters): 335.28  Predicted Distance (meters): 493.13 meters  Percentage of Predicted: 67.99  Percent Change (Calculated): -0.18        Interpretation:  Total distance walked in six minutes is mildly reduced indicating a reduction in overall  functional capacity. The patient did not meet criteria for supplemental oxygen prescription.  Clinical correlation suggested.  [] Mild exercise-induced hypoxemia described as an " arterial oxygen saturation of 93-95% (with a fall of 3-4% with exercise),   [] Moderate exercise-induced hypoxemia as a fall in oxygen saturation to  89-93% (with a fall of 3-4 % with exercise)  [] Severe exercise induced hypoxemia as < 89% O2 saturation (88% and below).  Medicare Criteria for Oxygen prescription comments: When arterial oxygen saturation is at or below 88% during exercise (severe exercise induced hypoxemia) then the patient falls under   Details about Medicare Group Criteria coverage can be found at http://www.cms.[facility].gov/manuals/downloads/     Celestino Key MD

## 2023-03-28 NOTE — PROGRESS NOTES
Subjective:       Patient ID: Edmund Chu is a 82 y.o. male.    Chief Complaint: Shortness of Breath, Apnea, and Pulmonary Hypertension      83yo male referred by Tere Johns MD for DIVYA  History of DIVYA diagnosed 2010 at [facility]; unsure of severity, he does not wear CPAP  Here today complaining of insomnia  Sleep onset insomnia for many years  Takes 10mg Ambien, sometimes cuts in half, he says this does not always work; takes with 1mg Ativan  Also tried medical marijuana, did not help; tried benadryl, made him too sleepy; tried melatonin, caused vivid dreams  In bed 12am, listens to audiobook - sets timer to go off in an hour; takes hours to fall asleep; if able to fall asleep he says he does not have a problem waking up at night  Snores, wakes up unrefreshed  Wife tells him he has restless legs - cannt sleep in same room    h/o hypertension, CHF, AVR 2016 ([facility]), h/o colon cancer  ICD implanted on 10/27/22  Echo 12/2022 estimated ejection fraction is 15 - 20%; estimated PA pressure 54mmHg  Taking 1.5 torsemide daily - SOB improved with this  No cough or wheezing    PFT report from 2017 reviewed from [facility], decreased FEV1 with bronchodilator improvement   He does not use any inhalers  Smoked from age [age]  Retired professor at [employer], studied [employer]  Ran [employer] as a young adult           03/28/2023  Last seen 01/10/2023  C/o NAVARRO since last year  Here to PFT, 6MWD and sleep study  Records reviewed  EF 15-20%  Has AICD  Sleep study showed DIVYA  Recommend CPAP titration  6mwd improved from 335.28m to 396.24m ( improved by 60 m)  Normal capacity  No desaturation  PLMD seen: check ferritn  PFT: Reduced DLCO and reduced TLC       EPWORTH SLEEPINESS SCALE 3/28/2023   Sitting and reading 0   Watching TV 1   Sitting, inactive in a public place (e.g. a theatre or a meeting) 0   As a passenger in a car for an hour without a break 0   Lying down to rest in the  afternoon when circumstances permit 1   Sitting and talking to someone 0   Sitting quietly after a lunch without alcohol 1   In a car, while stopped for a few minutes in traffic 0   Total score 3        STOP-Bang Questionnaire (validated DIVYA screening questionnaire)  Negative unless checked off.  [x] Snoring    [x]  Tired/Fatigued/Sleepy  [] Obstruction (apneas/choking)  [x] Pressure (HTN)  [] BMI >35  [x] Age >50  [x] Neck >40 cm  [x] Gender male   STOP-Bang = 6 (low risk 0-2,high risk 3-8)    Immunization History   Administered Date(s) Administered    Influenza (FLUAD) - Quadrivalent - Adjuvanted - PF *Preferred* (65+) 01/10/2023      Tobacco Use: Medium Risk    Smoking Tobacco Use: Former    Smokeless Tobacco Use: Never    Passive Exposure: Not on file      Past Medical History:   Diagnosis Date    Aortic valve prosthesis present     CHF (congestive heart failure)     CKD (chronic kidney disease) stage 3, GFR 30-59 ml/min     Colon cancer     Hypertension     PAF (paroxysmal atrial fibrillation)       Current Outpatient Medications on File Prior to Visit   Medication Sig Dispense Refill    allopurinoL (ZYLOPRIM) 100 MG tablet Take 200 mg by mouth.      carvediloL (COREG) 12.5 MG tablet Take 25 mg by mouth 2 (two) times daily with meals.      coenzyme Q10 100 mg capsule Take 100 mg by mouth once daily.      colchicine (COLCRYS) 0.6 mg tablet Take 0.6 mg by mouth once daily.      dapagliflozin (FARXIGA) 10 mg tablet Take 10 mg by mouth once daily.      doxazosin (CARDURA) 2 MG tablet Take 2 mg by mouth.      isosorbide dinitrate (ISORDIL) 10 MG tablet Take 10 mg by mouth.      LORazepam (ATIVAN) 0.5 MG tablet Take 1 mg by mouth 2 (two) times daily.      olmesartan (BENICAR) 40 MG tablet Take 20 mg by mouth once daily.      potassium chloride (KLOR-CON) 10 MEQ TbSR Take 10 mEq by mouth.      rivaroxaban (XARELTO) 10 mg Tab rivaroxaban 10 MG Oral Tablet [Xarelto]      spironolactone (ALDACTONE) 25 MG tablet Take 1  "tablet (25 mg total) by mouth once daily. 90 tablet 3    torsemide (DEMADEX) 20 MG Tab Take 30 mg by mouth once daily.      tumeric-ging-olive-oreg-capryl 100 mg-150 mg- 50 mg-150 mg Cap Take 1 capsule by mouth once daily.      zolpidem (AMBIEN) 10 mg Tab Take 5 mg by mouth nightly as needed.      zolpidem (AMBIEN) 5 MG Tab Take 5 mg by mouth.       No current facility-administered medications on file prior to visit.        Review of Systems   Constitutional:  Positive for fatigue. Negative for fever, weight loss, appetite change and weakness.   HENT:  Negative for postnasal drip, rhinorrhea, sinus pressure, trouble swallowing and congestion.    Respiratory:  Positive for dyspnea on extertion and somnolence. Negative for cough, sputum production, choking, chest tightness, shortness of breath and wheezing.    Cardiovascular:  Negative for chest pain and leg swelling.   Musculoskeletal:  Negative for arthralgias, gait problem and joint swelling.   Gastrointestinal:  Negative for nausea, vomiting and abdominal pain.   Neurological:  Negative for dizziness, weakness and headaches.   Psychiatric/Behavioral:  Negative for sleep disturbance.    All other systems reviewed and are negative.    Objective:       Vitals:    03/28/23 0923   BP: 139/81   Pulse: 68   Resp: 18   SpO2: 99%   Weight: 65.4 kg (144 lb 2.9 oz)   Height: 5' 9" (1.753 m)         Physical Exam   Constitutional: He is oriented to person, place, and time. He appears well-developed and well-nourished. No distress.   HENT:   Head: Normocephalic.   Nose: Nose normal.   Mouth/Throat: Oropharynx is clear and moist.   Cardiovascular: Normal rate and regular rhythm.   Pulmonary/Chest: Effort normal. No respiratory distress. He has no wheezes. He has no rhonchi. He has no rales.   Musculoskeletal:         General: No edema.      Cervical back: Normal range of motion and neck supple.   Lymphadenopathy: No supraclavicular adenopathy is present.     He has no cervical " "adenopathy.   Neurological: He is alert and oriented to person, place, and time. Gait normal.   Skin: Skin is warm and dry.   Psychiatric: He has a normal mood and affect.   Vitals reviewed.  Personal Diagnostic Review   Ordering Provider: AMANDA Mei         Interpreting Provider: Dr. Key  Performing nurse/tech/RT: QUIQUE Vences, DREW  Diagnosis: Shortness of Breath  Height: 5' 9" (175.3 cm)  Weight: 65.4 kg (144 lb 2.9 oz)  BMI (Calculated): 21.3              Patient Race:                                                                 Phase Oxygen Assessment Supplemental O2 Heart   Rate Blood Pressure Carl[place] Dyspnea Scale Rating   Resting 99 % Room Air 65 bpm 139/81 1   Exercise             Minute             1 96 % Room Air 86 bpm       2 96 % Room Air 95 bpm       3 98 % Room Air 100 bpm       4 98 % Room Air 102 bpm       5 97 % Room Air 102 bpm       6  98 % Room Air 105 bpm 162/84 2   Recovery             Minute             1 97 % Room Air 89 bpm       2 99 % Room Air 74 bpm       3 99 % Room Air 73 bpm       4 99 % Room Air 74 bpm 157/87 1      Six Minute Walk Summary  6MWT Status: completed without stopping  Patient Reported: Dyspnea (knee pain)              Interpretation:  Did the patient stop or pause?: No  Total Time Walked (Calculated): 360 seconds  Final Partial Lap Distance (feet): 100 feet  Total Distance Meters (Calculated): 396.24 meters  Predicted Distance Meters (Calculated): 501.32 meters  Percentage of Predicted (Calculated): 79.04  Peak VO2 (Calculated): 15.87  Mets: 4.53  Has The Patient Had a Previous Six Minute Walk Test?: Yes     Previous 6MWT Results  Has The Patient Had a Previous Six Minute Walk Test?: Yes  Date of Previous Test: 10/06/22  Total Time Walked: 360 seconds  Total Distance (meters): 335.28  Predicted Distance (meters): 493.13 meters  Percentage of Predicted: 67.99  Percent Change (Calculated): -0.18         PSG:  FEV1: 1.92L( 81.2%), FVC 2.53L( " "80.2%)  FEV1/FVC 76  TLC 5.25L( 76%)  DLCO 13.57( 55.9%)    PATIENT: Edmund Chu  STUDY DATE: 1/15/2023  REFERRING PHYSICIAN: Renetta Mei PA-C     INDICATIONS FOR POLYSOMNOGRAPHY: The patient is a 82 year year old Male who is 5' 9" and weighs 158.0 lbs.  His BMI equals 23.4.  A full night polysomnogram was performed to evaluate for -.  Hisory of DIVYA 2010 AT [facility]. Doesn't wear CPAP. Seen for insomnia. takes Ambien 10 mg sometimes cuts in half, takes with Ativan 1 mg.   Bed time 12 MN listens to audiobooks, tried Benadryl, tried melatonin, caused vivid dreams. Snores wakes unrefreshed, restless at night  Medical history hypertension, CHF, AICD EF 15-20%. Known right hemdiaphragm paralysis     POLYSOMNOGRAM DATA:  A full night polysomnogram recorded the standard physiologic parameters including EEG, EOG, EMG, EKG, nasal and oral airflow.  Respiratory parameters of chest and abdominal movements were recorded with [place]-Crystal motion transducers.  Oxygen saturation was recorded by pulse oximetry.       SLEEP ARCHITECTURE:  The total recording time of the polysomnogram was 412.1 minutes.  The total sleep time was 293.0 minutes.  The patient spent 10.1% of total sleep time in Stage N1, 73.9% in Stage N2, 0.0% in Stages N3 and N, and 16.0% in REM.   Sleep latency was 9.1 minutes.  REM latency was 132.5 minutes.  Sleep Efficiency was 71.1%.  Sleep Maintenance Efficiency was 72.4%.  Total wake time was 119.5 minutes for a total wake percentage of 27.2%.     RESPIRATORY EVENTS:  The polysomnogram revealed a presence of 13 obstructive, 1 central, and - mixed apneas resulting in an Apnea index of 2.9 events per hour.  There were 11 hypopneas (using 3% desaturation criteria) resulting in a Hypopnea index of 2.3 events per hour.  The combined Apnea/Hypopnea index (using 3% desaturation criteria for Hypopneas) was 5.1 events per hour.     Baseline oxygen saturation was 95.0%.  The lowest oxygen saturation was " 67.0%.       LIMB ACTIVITY:  There were 748 limb movements recorded.  Of this total, 745 were classified as PLMs.  Of the PLMs, 34 were associated with arousals.  The Limb Movement index was 153.2 per hour while the PLM index was 152.6 per hour.     CARDIAC SUMMARY:   The average pulse rate was 69.1 bpm.  The minimum pulse rate was 21.0 bpm while the maximum pulse rate was 214.0 bpm.     CONCLUSION:  AHI was 5 .1/hr REM AHI 15.3/hr: Mild to Moderate obstructive sleep apnea  Insomnia: Awake 23:02 hr to 23:32 hrs and 02:28 hrs to 03:26 hrs  PLM index was 152.6/hr  SpO2 was below 88% for 4.1 minutes                  DIAGNOSIS: Obstructive sleep apnea. Psychophysiological Insomnia / 307.42, PLMD     RECOMMENDATIONS:  Sleep hygiene  CPAP titration  Correlation for RLS, check ferritin    No flowsheet data found.      Assessment/Plan:       Problem List Items Addressed This Visit       Hypertension     Stable on BENICAR         Chronic combined systolic and diastolic congestive heart failure     Patient is identified as having Combined Systolic and Diastolic heart failure that is Chronic. CHF is currently controlled. Latest ECHO performed and demonstrates- Results for orders placed during the hospital encounter of 12/19/22    Echo    Interpretation Summary  · The left ventricle is mildly enlarged with severely decreased systolic function.  · The estimated ejection fraction is 15 - 20%.  · Grade II left ventricular diastolic dysfunction.  · Moderate right ventricular enlargement with severely reduced right ventricular systolic function.  · Severe left atrial enlargement.  · Severe right atrial enlargement.  · There is a 23 mm Freire II porcine bioprosthetic aortic valve present. There is trivial central aortic insufficiency present.  · The aortic valve mean gradient is 6 mmHg with a dimensionless index of 0.27.  · Moderate-to-severe mitral regurgitation.  · Moderate to severe tricuspid regurgitation.  · Moderate pulmonic  regurgitation.  · The estimated PA systolic pressure is greater than 54 mmHg.  · There is pulmonary hypertension.  . Continue Beta Blocker, Furosemide and Aldactone and monitor clinical status closely. Monitor on telemetry. Patient is on CHF pathway.  Monitor strict Is&Os and daily weights.  Place on fluid restriction of 1.5 L. Continue to stress to patient importance of self efficacy and  on diet for CHF. Last BNP reviewed- and noted below @LABRCNTIP(BNP,BNPTRIAGEBLO)@.           Relevant Orders    BiPAP/CPAP Titration ((Must have dx of DIVYA from previous sleep study)    S/P AVR (aortic valve replacement)     AVR 2016  Follow with cardiology         DIVYA (obstructive sleep apnea)     Mild DIVYA, Moderate severity in REM  Given low EF  CPAP titration  Patient hesitant however agrees         Relevant Orders    BiPAP/CPAP Titration ((Must have dx of DIVYA from previous sleep study)    Other insomnia     Chronic issue On AMBIEN         Pulmonary hypertension - Primary     PASP: 45  Moderate-to-severe mitral regurgitation.  Moderate to severe tricuspid regurgitation.  Moderate pulmonic regurgitation.  The estimated PA systolic pressure is greater than 54 mmHg.    Advised CPAP therapy would improve his situation         Relevant Orders    BiPAP/CPAP Titration ((Must have dx of DIVYA from previous sleep study)     Other Visit Diagnoses       PLMD (periodic limb movement disorder)        Relevant Orders    FERRITIN    Other symptoms and signs concerning food and fluid intake        Relevant Orders    FERRITIN          Follow up in about 4 weeks (around 4/25/2023), or Sleep study, lab.    Discussed diagnosis, its evaluation, treatment and usual course. All questions answered.    Patient verbalized understanding of plan and left in no acute distress    Thank you for the courtesy of participating in the care of this patient    Helder Noyola MD  [facility]

## 2023-03-28 NOTE — ASSESSMENT & PLAN NOTE
PASP: 45   Moderate-to-severe mitral regurgitation.   Moderate to severe tricuspid regurgitation.   Moderate pulmonic regurgitation.   The estimated PA systolic pressure is greater than 54 mmHg.    Advised CPAP therapy would improve his situation

## 2023-03-28 NOTE — PATIENT INSTRUCTIONS
Your provider has scheduled you for a sleep study.   You should be receiving a phone call from the sleep lab shortly after your study has been approved by your insurance. Please make sure you have your current phone numbers in the [facility] system. If you do not hear from anyone in the next 10 business days, please call the sleep lab at [phone] to schedule your sleep study. The sleep studies are performed at [facility] seven nights a week.  When you are scheduling your sleep study, they will also make you a follow up appointment with your provider. This follow up appointment will be 10-14 days after your sleep study to review the results. If it is noted that you do have sleep apnea on your initial sleep study, you may receive a call back for a second night study with the CPAP before you come back to the office.

## 2023-03-28 NOTE — ASSESSMENT & PLAN NOTE
Patient is identified as having Combined Systolic and Diastolic heart failure that is Chronic. CHF is currently controlled. Latest ECHO performed and demonstrates- Results for orders placed during the hospital encounter of 12/19/22    Echo    Interpretation Summary  · The left ventricle is mildly enlarged with severely decreased systolic function.  · The estimated ejection fraction is 15 - 20%.  · Grade II left ventricular diastolic dysfunction.  · Moderate right ventricular enlargement with severely reduced right ventricular systolic function.  · Severe left atrial enlargement.  · Severe right atrial enlargement.  · There is a 23 mm Freire II porcine bioprosthetic aortic valve present. There is trivial central aortic insufficiency present.  · The aortic valve mean gradient is 6 mmHg with a dimensionless index of 0.27.  · Moderate-to-severe mitral regurgitation.  · Moderate to severe tricuspid regurgitation.  · Moderate pulmonic regurgitation.  · The estimated PA systolic pressure is greater than 54 mmHg.  · There is pulmonary hypertension.  . Continue Beta Blocker, Furosemide and Aldactone and monitor clinical status closely. Monitor on telemetry. Patient is on CHF pathway.  Monitor strict Is&Os and daily weights.  Place on fluid restriction of 1.5 L. Continue to stress to patient importance of self efficacy and  on diet for CHF. Last BNP reviewed- and noted below @LABRCNTIP(BNP,BNPTRIAGEBLO)@.

## 2023-03-29 LAB
BRPFT: NORMAL
DLCO ADJ PRE: 14.16 ML/(MIN*MMHG)
DLCO SINGLE BREATH LLN: 17.36
DLCO SINGLE BREATH PRE REF: 55.9 %
DLCO SINGLE BREATH REF: 24.28
DLCOC SBVA LLN: 2.35
DLCOC SBVA PRE REF: 135.3 %
DLCOC SBVA REF: 3.52
DLCOC SINGLE BREATH LLN: 17.36
DLCOC SINGLE BREATH PRE REF: 58.3 %
DLCOC SINGLE BREATH REF: 24.28
DLCOVA LLN: 2.35
DLCOVA PRE REF: 129.6 %
DLCOVA PRE: 4.56 ML/(MIN*MMHG*L)
DLCOVA REF: 3.52
DLVAADJ PRE: 4.76 ML/(MIN*MMHG*L)
ERV LLN: -16449.1
ERV PRE REF: 120.8 %
ERV REF: 0.9
FEF 25 75 CHG: 0.4 %
FEF 25 75 LLN: 0.51
FEF 25 75 POST REF: 87.8 %
FEF 25 75 PRE REF: 87.4 %
FEF 25 75 REF: 1.7
FET100 CHG: 5.8 %
FEV1 CHG: 3.3 %
FEV1 FVC CHG: -1.5 %
FEV1 FVC LLN: 61
FEV1 FVC POST REF: 99 %
FEV1 FVC PRE REF: 100.5 %
FEV1 FVC REF: 75
FEV1 LLN: 1.54
FEV1 POST REF: 83.9 %
FEV1 PRE REF: 81.2 %
FEV1 REF: 2.36
FRCPLETH LLN: 2.74
FRCPLETH PREREF: 112.2 %
FRCPLETH REF: 3.72
FVC CHG: 4.9 %
FVC LLN: 2.2
FVC POST REF: 84.1 %
FVC PRE REF: 80.2 %
FVC REF: 3.15
IVC PRE: 1.83 L
IVC SINGLE BREATH LLN: 2.2
IVC SINGLE BREATH PRE REF: 58.1 %
IVC SINGLE BREATH REF: 3.15
MVV LLN: 92
MVV PRE REF: 58 %
MVV REF: 109
PEF CHG: -1.4 %
PEF LLN: 4.76
PEF POST REF: 79.1 %
PEF PRE REF: 80.2 %
PEF REF: 7.33
POST FEF 25 75: 1.5 L/S
POST FET 100: 7.3 SEC
POST FEV1 FVC: 74.62 %
POST FEV1: 1.98 L
POST FVC: 2.65 L
POST PEF: 5.8 L/S
PRE DLCO: 13.57 ML/(MIN*MMHG)
PRE ERV: 1.09 L
PRE FEF 25 75: 1.49 L/S
PRE FET 100: 6.9 SEC
PRE FEV1 FVC: 75.72 %
PRE FEV1: 1.92 L
PRE FRC PL: 4.18 L
PRE FVC: 2.53 L
PRE MVV: 63 L/MIN
PRE PEF: 5.88 L/S
PRE RV: 2.72 L
PRE TLC: 5.25 L
RAW LLN: 3.06
RAW PRE REF: 72.1 %
RAW PRE: 2.21 CMH2O*S/L
RAW REF: 3.06
RV LLN: 2.15
RV PRE REF: 96.4 %
RV REF: 2.82
RVTLC LLN: 36
RVTLC PRE REF: 114.7 %
RVTLC PRE: 51.82 %
RVTLC REF: 45
TLC LLN: 5.75
TLC PRE REF: 76 %
TLC REF: 6.9
VA PRE: 2.99 L
VA SINGLE BREATH LLN: 6.75
VA SINGLE BREATH PRE REF: 44.2 %
VA SINGLE BREATH REF: 6.75
VC LLN: 2.2
VC PRE REF: 80.2 %
VC PRE: 2.53 L
VC REF: 3.15
VTGRAWPRE: 3.81 L

## 2023-04-17 ENCOUNTER — OFFICE VISIT (OUTPATIENT)
Dept: CARDIOLOGY | Facility: CLINIC | Age: 83
End: 2023-04-17
Payer: MEDICARE

## 2023-04-17 VITALS
WEIGHT: 150.56 LBS | HEIGHT: 69 IN | BODY MASS INDEX: 22.3 KG/M2 | HEART RATE: 55 BPM | OXYGEN SATURATION: 100 % | SYSTOLIC BLOOD PRESSURE: 126 MMHG | DIASTOLIC BLOOD PRESSURE: 70 MMHG

## 2023-04-17 DIAGNOSIS — G47.33 OSA (OBSTRUCTIVE SLEEP APNEA): ICD-10-CM

## 2023-04-17 DIAGNOSIS — Z95.2 S/P AVR (AORTIC VALVE REPLACEMENT): ICD-10-CM

## 2023-04-17 DIAGNOSIS — I50.42 CHRONIC COMBINED SYSTOLIC AND DIASTOLIC CONGESTIVE HEART FAILURE: Primary | ICD-10-CM

## 2023-04-17 DIAGNOSIS — R06.02 SOB (SHORTNESS OF BREATH): ICD-10-CM

## 2023-04-17 DIAGNOSIS — I10 HYPERTENSION, UNSPECIFIED TYPE: ICD-10-CM

## 2023-04-17 DIAGNOSIS — Z95.810 ICD (IMPLANTABLE CARDIOVERTER-DEFIBRILLATOR) IN PLACE: ICD-10-CM

## 2023-04-17 PROCEDURE — 99999 PR PBB SHADOW E&M-EST. PATIENT-LVL III: CPT | Mod: PBBFAC,,, | Performed by: STUDENT IN AN ORGANIZED HEALTH CARE EDUCATION/TRAINING PROGRAM

## 2023-04-17 PROCEDURE — 99999 PR PBB SHADOW E&M-EST. PATIENT-LVL III: ICD-10-PCS | Mod: PBBFAC,,, | Performed by: STUDENT IN AN ORGANIZED HEALTH CARE EDUCATION/TRAINING PROGRAM

## 2023-04-17 PROCEDURE — 99213 OFFICE O/P EST LOW 20 MIN: CPT | Mod: PBBFAC | Performed by: STUDENT IN AN ORGANIZED HEALTH CARE EDUCATION/TRAINING PROGRAM

## 2023-04-17 PROCEDURE — 99214 OFFICE O/P EST MOD 30 MIN: CPT | Mod: S$PBB,,, | Performed by: STUDENT IN AN ORGANIZED HEALTH CARE EDUCATION/TRAINING PROGRAM

## 2023-04-17 PROCEDURE — 99214 PR OFFICE/OUTPT VISIT, EST, LEVL IV, 30-39 MIN: ICD-10-PCS | Mod: S$PBB,,, | Performed by: STUDENT IN AN ORGANIZED HEALTH CARE EDUCATION/TRAINING PROGRAM

## 2023-04-17 NOTE — PROGRESS NOTES
Section of Cardiology                  Cardiac Clinic Note    Chief Complaint/Reason for consultation:  Establish care      HPI:   Edmund Chu is a 82 y.o. male with h/o hypertension, CHF, AVR 2016 ([facility]), h/o colon cancerwho comes in to Cardiology Clinic to establish care.        7/27/2022  Per Care everywhere, last echocardiogram March 2021 with redo use LV function, right heart failure, moderate mitral regurgitation, normal functioning bioprosthetic aortic valve    Comes in with wife   Only coming in for ICD evaluation   Still seeing Dr. Viveros  Has had CHF since 2009- did not want ICD until now    Aortic valve replacement, no aspirin due to mild bruising     Reports heart issues since 2009- had colon cancer s/p resection     Walks regularly, 1.2 miles daily, worsening SOB  Recent orthopnea last few days  Reports leg swelling- recently needed to take torsemide which he doesn't take it regularly   He's a vegetarian, does not use salt  Trying to watch fluid intake     Family hx: cousin: ICD, afib   Stopped smoking at age of 32 yo. ETOH occ    8/31/22  BNP level was elevated, torsemide 100 mg daily increased to 200 mg daily at the time  Repeat BNP level was elevated, CMP showed stable renal function  Patient now back down to 100 mg   Dropped 6lbs since last visit  Reports daily weights, fluid and salt restriction  Has not taken Xarelto in fear of bleeding if he cuts himself  Blood pressure readings normotensive at home ranges a 110 to 135 systolic    Denies chest pain, shortness of breath, dizziness, palpitations, orthopnea, PND      10/7/22  Saw Dr. Wong, will be having ICD placement- but patient wants to wait  Reports rash/itching, unsure of which medication is causing it  Has seen dermatology  Switched torsemide to lasix due to itching, however, itching has not improved  BP elevated  Echo pending     Denies chest pain, shortness of breath, dizziness, palpitations, orthopnea,  PND      11/8/22  ICD implanted on 10/27/22  Reports cough and SOB, sputum production  Denies LE swelling, PND  Recently started back on diuretics, torsemide 20  No longer taking farxiga, spironolactone   Believes itching was due to wine, has since stopped   Did not see allergy/imm      11/22/22  Virtual visit   Concerned about his labs  Chest x-ray showing cardiac enlargement, vascular congestion, no pneumonia or pneumothorax  Labs show heart failure in congestion, BNP significantly elevated   Continues to have cough with sputum production  Reports shortness of breath with ambulation  Patient to take 2 tablets of torsemide   Will repeat labs in 1 week      12/12/22  Comes in with wife and daughter   Has h/o insomnia, but believes it's worsened after the ICD  Decided to switch from torsemide back to Lasix:  Thought torsemide was causing knee pain.  But switched back to torsemide 1.5 tablets daily  Recent BNP elevated 3,945   Repeat BNP pending today  Does not wear CPAP machine regularly  Still believes he has allergies to some medications        1/9/23  Still having issues sleeping  Taking medications for sleep  Feels better when he gets good sleep   Echo with EF 15-20%, mod-severe TR/MR (worsening from prior)  Taking 1.5 torsemide daily   SOB has improved  Restarted farxiga         4/17/23  Stopped some meds due to side effects  SOB much improved  Takes torsemide daily   Does not want to take xarelto   Walks 1.2 miles daily   Watches salt and fluid intake, weight self daily   He's vegetarian  7 lbs up since 3/23- has been eating more recently   Taking ativan for sleep       EKG 3/9/23 SB, RBBB, inferior infarct, TWI lateral leads   EKG 8/31/2022 NSR, RBBB, LAFB, PVCs,  ms   EKG 7/27/2022 NSR, RBBB, LAFB, inferior infarct, qtc 529 ms          Prior records  Stress test 12/2020 small reversible defect, apical segment.  Medium perfusion defect inferior wall  [facility] 2/21:  Left main 20% stenosis   Lad:  Mid LAD 50%  stenosis   Left circumflex: Diffuse irregularity  Mid RCA proximal portion 50% stenosis  Distal RCA 50% stenosis   Ramus diffuse irregularity  Echo 11/21 EF 30%, RV function is severely decreased          Echo 12/19/22  The left ventricle is mildly enlarged with severely decreased systolic function.  The estimated ejection fraction is 15 - 20%.  Grade II left ventricular diastolic dysfunction.  Moderate right ventricular enlargement with severely reduced right ventricular systolic function.  Severe left atrial enlargement.  Severe right atrial enlargement.  There is a 23 mm Freire II porcine bioprosthetic aortic valve present. There is trivial central aortic insufficiency present.  The aortic valve mean gradient is 6 mmHg with a dimensionless index of 0.27.  Moderate-to-severe mitral regurgitation.  Moderate to severe tricuspid regurgitation.  Moderate pulmonic regurgitation.  The estimated PA systolic pressure is greater than 54 mmHg.  There is pulmonary hypertension.    Echo 10/22  Concentric hypertrophy and severely decreased systolic function.  Severe left atrial enlargement.  The estimated ejection fraction is 20%.  Left ventricular diastolic dysfunction.  Normal right ventricular size with normal right ventricular systolic function.  Mild tricuspid regurgitation.      ECHO  3/21 (Care everywhere)  CONCLUSIONS:   - Exam indication: Initial evaluation of known cardiomyopathy   - The left ventricle is mildly dilated. There is left ventricular hypertrophy.   Left ventricular systolic function is severely decreased. EF = 26 ± 5% (2D 4-ch.)   Definity contrast used for endocardial border detection.   - The right ventricle is dilated. Right ventricular systolic function is   moderately decreased.   - The left atrial cavity is severely dilated.   - The right atrial cavity is dilated.   - There is moderate (2+ - 3+) mitral valve regurgitation due to restricted leaflet    motion and apical tethering of normal mitral  leaflet caused by LV enlargement.   Regurgitant orifice area (PISA) is 0.24 cm².   - Porcine prosthetic aortic valve. There is trace aortic valve regurgitation. The   peak gradient is 10 mmHg, the mean gradient is 5 mmHg and the dimensionless valve   index is 0.31.   - Estimated right ventricular systolic pressure is 42 mmHg plus right atrial   pressure. Estimated right atrial pressure is not included as the IVC was not seen.   - The patient has not had a prior CC echocardiographic exam for comparison.       STRESS TEST    [facility]      ROS: All 10 systems reviewed. Please refer to the HPI for pertinent positives. All other systems negative.     Past Medical History  Past Medical History:   Diagnosis Date    Aortic valve prosthesis present     CHF (congestive heart failure)     CKD (chronic kidney disease) stage 3, GFR 30-59 ml/min     Colon cancer     Hypertension     PAF (paroxysmal atrial fibrillation)        Surgical History  Past Surgical History:   Procedure Laterality Date    AORTIC VALVE REPLACEMENT  2016    COLON RESECTION            Allergies:   Review of patient's allergies indicates:   Allergen Reactions    Sacubitril-valsartan Hives and Swelling    Amlodipine Edema     Ankle    Lisinopril Other (See Comments)    Mefloquine Hives and Itching     Anti malarial      Spironolactone Edema       Social History:  Social History     Socioeconomic History    Marital status:    Tobacco Use    Smoking status: Former    Smokeless tobacco: Never   Substance and Sexual Activity    Alcohol use: Yes    Drug use: Never       Family History:  family history includes Colon cancer in his father; Hypertension in his mother.    Home Medications:  Current Outpatient Medications on File Prior to Visit   Medication Sig Dispense Refill    carvediloL (COREG) 12.5 MG tablet Take 25 mg by mouth 2 (two) times daily with meals.      coenzyme Q10 100 mg capsule Take 100 mg by mouth once daily.      LORazepam (ATIVAN) 0.5 MG tablet  "Take 1 mg by mouth 2 (two) times daily.      olmesartan (BENICAR) 40 MG tablet Take 20 mg by mouth once daily.      tumeric-ging-olive-oreg-capryl 100 mg-150 mg- 50 mg-150 mg Cap Take 1 capsule by mouth once daily.      allopurinoL (ZYLOPRIM) 100 MG tablet Take 200 mg by mouth.      colchicine (COLCRYS) 0.6 mg tablet Take 0.6 mg by mouth once daily.      dapagliflozin (FARXIGA) 10 mg tablet Take 10 mg by mouth once daily.      doxazosin (CARDURA) 2 MG tablet Take 2 mg by mouth.      isosorbide dinitrate (ISORDIL) 10 MG tablet Take 10 mg by mouth.      potassium chloride (KLOR-CON) 10 MEQ TbSR Take 10 mEq by mouth.      rivaroxaban (XARELTO) 10 mg Tab rivaroxaban 10 MG Oral Tablet [Xarelto]      spironolactone (ALDACTONE) 25 MG tablet Take 1 tablet (25 mg total) by mouth once daily. (Patient not taking: Reported on 4/17/2023) 90 tablet 3    torsemide (DEMADEX) 20 MG Tab Take 30 mg by mouth once daily.      zolpidem (AMBIEN) 10 mg Tab Take 5 mg by mouth nightly as needed.      zolpidem (AMBIEN) 5 MG Tab Take 5 mg by mouth.       No current facility-administered medications on file prior to visit.       Physical exam:  /70   Pulse (!) 55   Ht 5' 9" (1.753 m)   Wt 68.3 kg (150 lb 9.2 oz)   SpO2 100%   BMI 22.24 kg/m²         General: Pt is a 82 y.o. year old male who is AAOx3, in NAD, is pleasant, well nourished, looks stated age  HEENT: PERRL, EOMI, Oral mucosa pink & moist  CVS  No abnormal cardiac pulsations noted on inspection. The apical impulse is normal on palpation, and is located in the left 5th intercostal space in the mid - clavicular line. No palpable thrills or abnormal pulsations noted. RR, S1 - S2 heard, no murmurs, rubs or gallops appreciated.   PUL :  NO wheezes, rales, crackles   ABD : BS +, soft. No tenderness elicited   LE : No C/C/E. Distal Pulses palpable B/L           LABS:    Chemistry:   Lab Results   Component Value Date     03/13/2023    K 4.9 03/13/2023     03/13/2023 "    CO2 25 03/13/2023    BUN 12 03/13/2023    CREATININE 0.9 03/13/2023    CALCIUM 9.3 03/13/2023     Cardiac Markers: No results found for: CKTOTAL, CKMB, CKMBINDEX, TROPONINI  Cardiac Markers (Last 3): No results found for: CKTOTAL, CKMB, CKMBINDEX, TROPONINI  CBC:   Lab Results   Component Value Date    WBC 4.24 03/10/2023    HGB 13.2 (L) 03/10/2023    HCT 40.2 03/10/2023    MCV 96 03/10/2023     03/10/2023     Lipids: No results found for: CHOL, TRIG, HDL, LDLDIRECT  Coagulation:   Lab Results   Component Value Date    INR 1.2 10/06/2022    APTT 27.8 10/06/2022           Assessment      1. Chronic combined systolic and diastolic congestive heart failure    2. Hypertension, unspecified type    3. SOB (shortness of breath)    4. S/P AVR (aortic valve replacement)    5. DIVYA (obstructive sleep apnea)    6. ICD (implantable cardioverter-defibrillator) in place              Plan:    Shortness of breath/cough - resolved   Continue torsemide 1.5 mg daily (increase to 2 tablets if symptoms worsen)    Congestive heart failure s/p ICD  NYHA class III, stage C  Systolic heart failure with right ventricular failure  Continue carvedilol, Benicar,  Stopped Farxiga, spironolactone due side effects   Stopped entresto - due to swelling (has allergy)  Taking Torsemide 30 mg     ICD implanted  F/u with device clinic   Follow up with Dr. Grider     Aortic valve replacement  Echo 12/22 with normal EF, valve looks to be functioning properly     PAF  Stopped Xarelto due side effects  Continue coreg     Hypertension  Continue above regimen         This note was prepared using voice recognition system and is likely to have sound alike errors that may have been overlooked even after proofreading.     I have reviewed all pertinent chart information.  Plans and recommendations have been formulated under my direct supervision. All questions answered and patient voiced understanding.   If symptoms persist go to the ED.    RTC in 4  munir Johns MD  Cardiology

## 2023-04-29 ENCOUNTER — CLINICAL SUPPORT (OUTPATIENT)
Dept: CARDIOLOGY | Facility: HOSPITAL | Age: 83
End: 2023-04-29
Payer: MEDICARE

## 2023-04-29 DIAGNOSIS — Z95.810 PRESENCE OF AUTOMATIC (IMPLANTABLE) CARDIAC DEFIBRILLATOR: ICD-10-CM

## 2023-05-01 ENCOUNTER — OFFICE VISIT (OUTPATIENT)
Dept: CARDIOLOGY | Facility: CLINIC | Age: 83
End: 2023-05-01
Payer: MEDICARE

## 2023-05-01 VITALS
SYSTOLIC BLOOD PRESSURE: 126 MMHG | HEIGHT: 70 IN | HEART RATE: 75 BPM | WEIGHT: 151 LBS | DIASTOLIC BLOOD PRESSURE: 80 MMHG | BODY MASS INDEX: 21.62 KG/M2 | OXYGEN SATURATION: 95 %

## 2023-05-01 DIAGNOSIS — Z95.2 S/P AVR (AORTIC VALVE REPLACEMENT): ICD-10-CM

## 2023-05-01 DIAGNOSIS — I45.10 RBBB: ICD-10-CM

## 2023-05-01 DIAGNOSIS — I27.20 PULMONARY HYPERTENSION: ICD-10-CM

## 2023-05-01 DIAGNOSIS — G47.33 OSA (OBSTRUCTIVE SLEEP APNEA): ICD-10-CM

## 2023-05-01 DIAGNOSIS — I10 PRIMARY HYPERTENSION: ICD-10-CM

## 2023-05-01 DIAGNOSIS — N18.31 STAGE 3A CHRONIC KIDNEY DISEASE: ICD-10-CM

## 2023-05-01 DIAGNOSIS — I49.3 PVC'S (PREMATURE VENTRICULAR CONTRACTIONS): ICD-10-CM

## 2023-05-01 DIAGNOSIS — I50.42 CHRONIC COMBINED SYSTOLIC AND DIASTOLIC CONGESTIVE HEART FAILURE: Primary | ICD-10-CM

## 2023-05-01 DIAGNOSIS — Z95.810 ICD (IMPLANTABLE CARDIOVERTER-DEFIBRILLATOR) IN PLACE: ICD-10-CM

## 2023-05-01 DIAGNOSIS — F41.9 ANXIETY: ICD-10-CM

## 2023-05-01 PROCEDURE — 99999 PR PBB SHADOW E&M-EST. PATIENT-LVL III: CPT | Mod: PBBFAC,,, | Performed by: INTERNAL MEDICINE

## 2023-05-01 PROCEDURE — 99215 PR OFFICE/OUTPT VISIT, EST, LEVL V, 40-54 MIN: ICD-10-PCS | Mod: S$PBB,,, | Performed by: INTERNAL MEDICINE

## 2023-05-01 PROCEDURE — 99213 OFFICE O/P EST LOW 20 MIN: CPT | Mod: PBBFAC | Performed by: INTERNAL MEDICINE

## 2023-05-01 PROCEDURE — 99999 PR PBB SHADOW E&M-EST. PATIENT-LVL III: ICD-10-PCS | Mod: PBBFAC,,, | Performed by: INTERNAL MEDICINE

## 2023-05-01 PROCEDURE — 99215 OFFICE O/P EST HI 40 MIN: CPT | Mod: S$PBB,,, | Performed by: INTERNAL MEDICINE

## 2023-05-01 NOTE — PROGRESS NOTES
Subjective:   Patient ID:  Edmund Chu is a 82 y.o. male     Chief complaint:Congestive Heart Failure      HPI  New patient to me as of 09/26/2022.  Referred by Dr Johns for evaluation and management of CHF/ICD/PAF   --   Background as gleaned from patient's records and today's interview :  81 y.o. male with h/o hypertension, CHF, AVR 2016 ([facility]), h/o colon cancer   Has had CHF since 2009- did not want ICD until now   Aortic valve replacement, no aspirin due to mild bruising   Echo from [facility] in jan 2022:  Final Impressions   1. Moderately enlarged left ventricular chamber size, moderate generalized hypokinesis,   calculated 2-D biplane volumetric ejection fraction 29%.   2. Abnormal left ventricular geometry with  eccentric left ventricular hypertrophy.   3. Moderately enlarged right ventricular chamber size, moderate-severely reduced systolic   function, estimated right ventricular systolic pressure 58 mmHg (right atrial pressure of 15   mmHg).   4. Status post 23 mm Freire II porcine aortic valve prosthesis. Mean gradient; 12 mmHg.   Trivial prosthetic and periprosthetic regurgitation.   5. Mild-moderate tricuspid valve regurgitation.   6. Normal mid ascending aorta diameter (diameter 32 mm at mid level).   7. Mildly enlarged inferior vena cava size with reduced inspiratory collapse (<50%).   8. Compared to the report of 05/16/2015 no significant change has occurred.     [facility] 2/21:  Left main 20% stenosis   Lad:  Mid LAD 50% stenosis   Left circumflex: Diffuse irregularity  Mid RCA proximal portion 50% stenosis  Distal RCA 50% stenosis   Ramus diffuse irregularity     PFTs:  3/7/2017  5:20 PM   Spirometry is abnormal. The forced vital capacity is mildly   reduced. The flows are abnormal. After bronchodilator there was   significant improvement in the FEV1. The total lung capacity is   mildly reduced. Diffusion is normal.Mixed obstructive restrictive   impairment of a mild to moderate degree.  There is reversibility       Latest Reference Range & Units 07/27/22 10:04 08/05/22 12:14 08/17/22 12:33   BNP 0 - 99 pg/mL 2,819 (H) 1,369 (H) 1,441 (H)        Latest Reference Range & Units 08/31/22 09:39   Sodium 136 - 145 mmol/L 137   Potassium 3.5 - 5.1 mmol/L 4.5   Chloride 95 - 110 mmol/L 99   CO2 23 - 29 mmol/L 27   Anion Gap 8 - 16 mmol/L 11   BUN 8 - 23 mg/dL 34 (H)   Creatinine 0.5 - 1.4 mg/dL 1.6 (H)   eGFR >60 mL/min/1.73 m^2 43.6 !   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  His symptoms of dyspnea and orthopnea have somewhat abated.    He tried Entresto and he says that he was allergic to it (itching around the eyes). However, he is on Benicar - he does have a rash on his back.   His BPs at home were rather soft in jan  Last week 102/61 to 136/81.      Has had a DCCV in 3/2021  Has been with worse CHF Sx since 10/2021.   >>   He is a good candidate in theory for ICD implantation.  I am not sure how decreased his is functionality.  He seems to me to be closer to class 3 than class 2.  Still, there may be a statistical benefit the having an ICD implant.  Also, he is probably not on maximum tolerable medical therapy.  Will try to advance this.   >>  Add tissue Doppler indices to echocardiogram.  Review and decide whether CRT may be helpful.  If he has significant dyssynchrony, we may consider CRT.  If not, we may consider other heart failure devices such as Barostim or CCM.  6 minute walk now  Chest x-ray PA and lateral.  Start SGLT2 inhibitor at 10 mg per day.  Obtain BMP in 1 week.  Patient has the rash on his back and may be drug related.  Will refer to dermatology for evaluation.    Patient is supposed to have an allergic reaction  to ARB.  Will refer to allergy because we are interested in starting Entresto if at all possible.     I have discussed the ICD implant procedure in detail with the patient. I described its benefits and risks. I reviewed alternative therapies and discussed their potential value. The patient was given ample opportunity to express concerns and ask questions and I provided appropriate responses and  answers to such.The patient understands and agrees to proceed.  Consent form was signed today by patient and myself and appropriately witnessed.      Update 12/12/2022:  Had an ICD implant on 10/27/2022  Bio Acticor 7 VRT-Dx [id number], [id number], PID: [id number]   ICD lead: Bio Plexa Pro MRI S-Dx 65/17, [id number]   Sub-pectoral placement     This was evaluated today and is in excellent repair.     Since then, he has taken himself off of his CHF medications due to reasons of his own.  He has also manipulated the his diuretic doses and switched back and forth between furosemide and torsemide.     He is complaining of more NAVARRO, orthopnea (equivalent to 5 pillows) and leg edema.     He is here today with his wife and daughter.        Update 03/05/2023 :  He had the VDx implant in January.  ICD and lead implanted 10/27/22, [employer]   Bio Acticor 7 VRT-Dx [id number], [id number], PID: [id number]   ICD lead: Bio Plexa Pro MRI S-Dx 65/17, [id number]   1/30/23-underlying SB @ 56, intact conduction @ 200ms, , RBBB   Sub-pectoral placement  After being initially self medicating, he has endorsed the plans that we have set for him and is following instructions.    He came back from his trip and attempted restarting Entresto but had the swelling of his hands as a result.  He stopped it and the swelling has decreased.  He seems to be truly allergic to sacubitril.  ICD evaluation reveals no significant arrhythmias and excellent parameters with normal function.  I have reviewed the actual image of the ECG tracing obtained today and it shows NSR with RBBB,  occasional PVCs.  He now walks 0.8 to 1.2 miles at a time.  >>      He is allergic to sacubitril.  Will keep him on ARB only.    Update 05/03/2023 :  Stopped some meds due to side effects-specifically, Farxiga was discontinued due to genital itching.  SOB much improved  Takes torsemide daily   He says that in general, he feels well.  He now is comfortable with the idea that he has an ICD in fact is happy decided to have it implanted.  He also has stopped Aldactone.  Not sure whether there is attributable side effects to it but he complained of muscle pains and thought that this would be a result of the medication.  Current Outpatient Medications   Medication Sig    carvediloL (COREG) 12.5 MG tablet Take 25 mg by mouth 2 (two) times daily with meals.    coenzyme Q10 100 mg capsule Take 100 mg by mouth once daily.    dapagliflozin (FARXIGA) 10 mg tablet Take 10 mg by mouth once daily.    LORazepam (ATIVAN) 0.5 MG tablet Take 1 mg by mouth 2 (two) times daily.    olmesartan (BENICAR) 40 MG tablet Take 20 mg by mouth once daily.    torsemide (DEMADEX) 20 MG Tab Take 30 mg by mouth once daily.    tumeric-ging-olive-oreg-capryl 100 mg-150 mg- 50 mg-150 mg Cap Take 1 capsule by mouth once daily.     No current facility-administered medications for this visit.       Review of Systems     Constitutional: Reviewed  for decreased appetite, weight gain and weight loss.   HENT: Reviewed for nosebleeds.    Eyes:  Reviewed for blurred vision and visual disturbance.   Cardiovascular: Reviewed for chest pain, claudication, cyanosis,dyspnea on exertion, leg swelling, orthopnea,paroxysmal nocturnal dyspnearregular heartbeats, palpitations, near-syncope, and syncope.   Respiratory: Reviewed for cough, shortness of breath, wheezing, sleep disturbances due to breathing and snoring, .    Endocrine: Reviewed for heat intolerance.   Hematologic/Lymphatic: Reviewed for easy bruisability/bleeding.   Skin: Reviewed for rash.   Musculoskeletal:  Reviewed for muscle weakness and myalgias.   Gastrointestinal: Reviewed for abdominal pain, anorexia, melena, nausea and vomiting.   Genitourinary: Reviewed for hesitancy, frequency, nocturia and incontinence.   Neurological: Reviewed for excessive daytime sleepiness, dizziness, vertigo, weakness, headaches, loss of balance and seizures,   Psychiatric/Behavioral:  Reviewed for insomnia, altered mental status, depression, anxiety and nervousness.       All symptoms reviewed above were negative except for occasional constipation, occasional loss of balance, occasional depression.  He also endorses anxiety.  He drinks about  one beer a day.       Social History     Tobacco Use   Smoking Status Former   Smokeless Tobacco Never        Objective:     Physical Exam  Vitals and nursing note reviewed.   Constitutional:       Appearance: Normal appearance. He is well-developed.   HENT:      Head: Normocephalic and atraumatic.      Right Ear: External ear normal.      Left Ear: External ear normal.   Eyes:      Conjunctiva/sclera: Conjunctivae normal.      Left eye: Left conjunctiva is not injected. No hemorrhage.     Pupils: Pupils are equal, round, and reactive to light.   Neck:      Thyroid: No thyromegaly.      Vascular: No JVD.   Cardiovascular:      Rate and Rhythm: Normal rate and regular rhythm.      Chest Wall: PMI is not displaced.      Pulses: Intact distal pulses.           Carotid pulses are 2+ on the right side and 2+ on the left side.       Radial pulses are 2+ on the right side and 2+ on the left side.        Dorsalis pedis pulses are 2+ on the right side and 2+ on the left side.        Posterior tibial pulses are 2+ on the right side and 2+ on the left side.      Heart sounds: Normal heart sounds. No midsystolic click and no opening snap. No murmur heard.    No friction rub. No gallop.   Pulmonary:      Effort: Pulmonary effort is normal. No respiratory distress.      Breath sounds: Normal breath sounds. No  "wheezing or rales.   Chest:      Chest wall: No tenderness.      Comments: Device pocket is in excellent repair.  Abdominal:      Palpations: Abdomen is soft. Abdomen is not rigid. There is no hepatomegaly.      Tenderness: There is no abdominal tenderness.   Musculoskeletal:         General: No tenderness. Normal range of motion.      Cervical back: Neck supple.      Right knee: No swelling.      Left knee: No swelling.      Right lower leg: No swelling.      Left lower leg: No swelling.      Right ankle: No swelling.      Left ankle: No swelling.      Right foot: No swelling.      Left foot: No swelling.   Skin:     General: Skin is warm and dry.      Findings: No rash.   Neurological:      Mental Status: He is alert and oriented to person, place, and time.      Cranial Nerves: No cranial nerve deficit.      Coordination: Coordination normal.      Deep Tendon Reflexes: Reflexes are normal and symmetric.   Psychiatric:         Behavior: Behavior normal.   /80   Pulse 75   Ht 5' 10" (1.778 m)   Wt 68.5 kg (151 lb 0.2 oz)   SpO2 95%   BMI 21.67 kg/m²       Results for orders placed during the hospital encounter of 12/19/22    Echo    Interpretation Summary  · The left ventricle is mildly enlarged with severely decreased systolic function.  · The estimated ejection fraction is 15 - 20%.  · Grade II left ventricular diastolic dysfunction.  · Moderate right ventricular enlargement with severely reduced right ventricular systolic function.  · Severe left atrial enlargement.  · Severe right atrial enlargement.  · There is a 23 mm Freire II porcine bioprosthetic aortic valve present. There is trivial central aortic insufficiency present.  · The aortic valve mean gradient is 6 mmHg with a dimensionless index of 0.27.  · Moderate-to-severe mitral regurgitation.  · Moderate to severe tricuspid regurgitation.  · Moderate pulmonic regurgitation.  · The estimated PA systolic pressure is greater than 54 mmHg.  · There " is pulmonary hypertension.    WBC   Date Value Ref Range Status   03/10/2023 4.24 3.90 - 12.70 K/uL Final     Hematocrit   Date Value Ref Range Status   03/10/2023 40.2 40.0 - 54.0 % Final     Hemoglobin   Date Value Ref Range Status   03/10/2023 13.2 (L) 14.0 - 18.0 g/dL Final     Lab Results   Component Value Date     03/10/2023     Lab Results   Component Value Date    CREATININE 0.9 03/13/2023    EGFRNORACEVR >60.0 03/13/2023    K 4.9 03/13/2023     Lab Results   Component Value Date    BNP 2,368 (H) 03/13/2023            reports current alcohol use.  Past Medical History:   Diagnosis Date    Aortic valve prosthesis present     CHF (congestive heart failure)     CKD (chronic kidney disease) stage 3, GFR 30-59 ml/min     Colon cancer     Hypertension     PAF (paroxysmal atrial fibrillation)      Past Surgical History:   Procedure Laterality Date    AORTIC VALVE REPLACEMENT  2016    COLON RESECTION       Family History   Problem Relation Age of Onset    Hypertension Mother     Colon cancer Father        Assessment:    He seems to be doing well and is a comfortable class 2.  He has multiple drug allergies that limit our ability to modify his disease.  1. Chronic combined systolic and diastolic congestive heart failure    2. S/P AVR (aortic valve replacement)    3. RBBB    4. ICD (implantable cardioverter-defibrillator) in place    5. PVC's (premature ventricular contractions)    6. Primary hypertension    7. Stage 3a chronic kidney disease    8. DIVYA (obstructive sleep apnea)    9. Anxiety    10. Pulmonary hypertension        Plan:   Since Aldactone is potentially disease modifying, I urged Mr. Chu to try it again and see if he can tolerate it.  .  Follow up in about 1 year (around 5/1/2024), or if symptoms worsen or fail to improve.    There are no discontinued medications.         Medication List with Changes/Refills   Current Medications    CARVEDILOL (COREG) 12.5 MG TABLET    Take 25 mg by mouth 2  (two) times daily with meals.    COENZYME Q10 100 MG CAPSULE    Take 100 mg by mouth once daily.    DAPAGLIFLOZIN (FARXIGA) 10 MG TABLET    Take 10 mg by mouth once daily.    LORAZEPAM (ATIVAN) 0.5 MG TABLET    Take 1 mg by mouth 2 (two) times daily.    OLMESARTAN (BENICAR) 40 MG TABLET    Take 20 mg by mouth once daily.    TORSEMIDE (DEMADEX) 20 MG TAB    Take 30 mg by mouth once daily.    TUMERIC-GING-OLIVE-OREG-CAPRYL 100 MG-150 MG- 50 MG-150 MG CAP    Take 1 capsule by mouth once daily.       This note is at least partially dictated using the M*Modal Fluency Direct word recognition program. There are word recognition mistakes that are occasionally missed on review.

## 2023-05-03 ENCOUNTER — PATIENT MESSAGE (OUTPATIENT)
Dept: CARDIOLOGY | Facility: CLINIC | Age: 83
End: 2023-05-03
Payer: MEDICARE

## 2023-07-28 ENCOUNTER — CLINICAL SUPPORT (OUTPATIENT)
Dept: CARDIOLOGY | Facility: HOSPITAL | Age: 83
End: 2023-07-28
Payer: MEDICARE

## 2023-07-28 DIAGNOSIS — Z95.810 PRESENCE OF AUTOMATIC (IMPLANTABLE) CARDIAC DEFIBRILLATOR: ICD-10-CM

## 2023-07-28 PROCEDURE — 93295 CARDIAC DEVICE CHECK - REMOTE: ICD-10-PCS | Mod: ,,, | Performed by: INTERNAL MEDICINE

## 2023-07-28 PROCEDURE — 93295 DEV INTERROG REMOTE 1/2/MLT: CPT | Mod: ,,, | Performed by: INTERNAL MEDICINE

## 2023-07-31 ENCOUNTER — OFFICE VISIT (OUTPATIENT)
Dept: CARDIOLOGY | Facility: CLINIC | Age: 83
End: 2023-07-31
Payer: MEDICARE

## 2023-07-31 VITALS
OXYGEN SATURATION: 99 % | HEART RATE: 60 BPM | BODY MASS INDEX: 22.54 KG/M2 | DIASTOLIC BLOOD PRESSURE: 80 MMHG | HEIGHT: 70 IN | SYSTOLIC BLOOD PRESSURE: 124 MMHG | WEIGHT: 157.44 LBS

## 2023-07-31 DIAGNOSIS — Z88.9 H/O MULTIPLE ALLERGIES: ICD-10-CM

## 2023-07-31 DIAGNOSIS — Z91.199 NONADHERENCE TO MEDICAL TREATMENT: ICD-10-CM

## 2023-07-31 DIAGNOSIS — I27.20 PULMONARY HYPERTENSION: ICD-10-CM

## 2023-07-31 DIAGNOSIS — I50.42 CHRONIC COMBINED SYSTOLIC AND DIASTOLIC CONGESTIVE HEART FAILURE: ICD-10-CM

## 2023-07-31 DIAGNOSIS — I45.10 RBBB: ICD-10-CM

## 2023-07-31 DIAGNOSIS — F41.9 ANXIETY: ICD-10-CM

## 2023-07-31 DIAGNOSIS — Z95.810 PRESENCE OF AUTOMATIC (IMPLANTABLE) CARDIAC DEFIBRILLATOR: Primary | ICD-10-CM

## 2023-07-31 DIAGNOSIS — I48.0 PAF (PAROXYSMAL ATRIAL FIBRILLATION): ICD-10-CM

## 2023-07-31 DIAGNOSIS — Z95.2 S/P AVR (AORTIC VALVE REPLACEMENT): ICD-10-CM

## 2023-07-31 DIAGNOSIS — R06.02 SOB (SHORTNESS OF BREATH): ICD-10-CM

## 2023-07-31 DIAGNOSIS — I10 PRIMARY HYPERTENSION: ICD-10-CM

## 2023-07-31 DIAGNOSIS — N18.31 STAGE 3A CHRONIC KIDNEY DISEASE: ICD-10-CM

## 2023-07-31 DIAGNOSIS — I49.3 PVC'S (PREMATURE VENTRICULAR CONTRACTIONS): ICD-10-CM

## 2023-07-31 PROCEDURE — 99214 PR OFFICE/OUTPT VISIT, EST, LEVL IV, 30-39 MIN: ICD-10-PCS | Mod: S$PBB,,, | Performed by: STUDENT IN AN ORGANIZED HEALTH CARE EDUCATION/TRAINING PROGRAM

## 2023-07-31 PROCEDURE — 99213 OFFICE O/P EST LOW 20 MIN: CPT | Mod: PBBFAC | Performed by: STUDENT IN AN ORGANIZED HEALTH CARE EDUCATION/TRAINING PROGRAM

## 2023-07-31 PROCEDURE — 99999 PR PBB SHADOW E&M-EST. PATIENT-LVL III: ICD-10-PCS | Mod: PBBFAC,,, | Performed by: STUDENT IN AN ORGANIZED HEALTH CARE EDUCATION/TRAINING PROGRAM

## 2023-07-31 PROCEDURE — 99999 PR PBB SHADOW E&M-EST. PATIENT-LVL III: CPT | Mod: PBBFAC,,, | Performed by: STUDENT IN AN ORGANIZED HEALTH CARE EDUCATION/TRAINING PROGRAM

## 2023-07-31 PROCEDURE — 99214 OFFICE O/P EST MOD 30 MIN: CPT | Mod: S$PBB,,, | Performed by: STUDENT IN AN ORGANIZED HEALTH CARE EDUCATION/TRAINING PROGRAM

## 2023-07-31 NOTE — PROGRESS NOTES
Section of Cardiology                  Cardiac Clinic Note    Chief Complaint/Reason for consultation:  Establish care      HPI:   Edmund Chu is a 82 y.o. male with h/o hypertension, CHF, AVR 2016 ([facility]), h/o colon cancerwho comes in to Cardiology Clinic to establish care.        7/27/2022  Per Care everywhere, last echocardiogram March 2021 with redo use LV function, right heart failure, moderate mitral regurgitation, normal functioning bioprosthetic aortic valve    Comes in with wife   Only coming in for ICD evaluation   Still seeing Dr. Viveros  Has had CHF since 2009- did not want ICD until now    Aortic valve replacement, no aspirin due to mild bruising     Reports heart issues since 2009- had colon cancer s/p resection     Walks regularly, 1.2 miles daily, worsening SOB  Recent orthopnea last few days  Reports leg swelling- recently needed to take torsemide which he doesn't take it regularly   He's a vegetarian, does not use salt  Trying to watch fluid intake     Family hx: cousin: ICD, afib   Stopped smoking at age of 30 yo. ETOH occ    8/31/22  BNP level was elevated, torsemide 100 mg daily increased to 200 mg daily at the time  Repeat BNP level was elevated, CMP showed stable renal function  Patient now back down to 100 mg   Dropped 6lbs since last visit  Reports daily weights, fluid and salt restriction  Has not taken Xarelto in fear of bleeding if he cuts himself  Blood pressure readings normotensive at home ranges a 110 to 135 systolic    Denies chest pain, shortness of breath, dizziness, palpitations, orthopnea, PND      10/7/22  Saw Dr. Wong, will be having ICD placement- but patient wants to wait  Reports rash/itching, unsure of which medication is causing it  Has seen dermatology  Switched torsemide to lasix due to itching, however, itching has not improved  BP elevated  Echo pending     Denies chest pain, shortness of breath, dizziness, palpitations, orthopnea,  PND      11/8/22  ICD implanted on 10/27/22  Reports cough and SOB, sputum production  Denies LE swelling, PND  Recently started back on diuretics, torsemide 20  No longer taking farxiga, spironolactone   Believes itching was due to wine, has since stopped   Did not see allergy/imm      11/22/22  Virtual visit   Concerned about his labs  Chest x-ray showing cardiac enlargement, vascular congestion, no pneumonia or pneumothorax  Labs show heart failure in congestion, BNP significantly elevated   Continues to have cough with sputum production  Reports shortness of breath with ambulation  Patient to take 2 tablets of torsemide   Will repeat labs in 1 week      12/12/22  Comes in with wife and daughter   Has h/o insomnia, but believes it's worsened after the ICD  Decided to switch from torsemide back to Lasix:  Thought torsemide was causing knee pain.  But switched back to torsemide 1.5 tablets daily  Recent BNP elevated 3,945   Repeat BNP pending today  Does not wear CPAP machine regularly  Still believes he has allergies to some medications        1/9/23  Still having issues sleeping  Taking medications for sleep  Feels better when he gets good sleep   Echo with EF 15-20%, mod-severe TR/MR (worsening from prior)  Taking 1.5 torsemide daily   SOB has improved  Restarted farxiga         4/17/23  Stopped some meds due to side effects  SOB much improved  Takes torsemide daily   Does not want to take xarelto   Walks 1.2 miles daily   Watches salt and fluid intake, weight self daily   He's vegetarian  7 lbs up since 3/23- has been eating more recently   Taking ativan for sleep       7/31/23  Reports for the last week, has been having shortness of breath  Has been eating pizza more often than normal  Has exertional shortness of breath and PND/orthopnea   Denies chest pain   Taking torsemide 1 tablet and should be taking 1.5 tablets  Watching fluid intake   Reports weight has increased a bit at home  144 lbs in 3/23->157 lbs  today    EKG 3/9/23 SB, RBBB, inferior infarct, TWI lateral leads   EKG 8/31/2022 NSR, RBBB, LAFB, PVCs,  ms   EKG 7/27/2022 NSR, RBBB, LAFB, inferior infarct, qtc 529 ms          Prior records  Stress test 12/2020 small reversible defect, apical segment.  Medium perfusion defect inferior wall  LHC 2/21:  Left main 20% stenosis   Lad:  Mid LAD 50% stenosis   Left circumflex: Diffuse irregularity  Mid RCA proximal portion 50% stenosis  Distal RCA 50% stenosis   Ramus diffuse irregularity  Echo 11/21 EF 30%, RV function is severely decreased          Echo 12/19/22  The left ventricle is mildly enlarged with severely decreased systolic function.  The estimated ejection fraction is 15 - 20%.  Grade II left ventricular diastolic dysfunction.  Moderate right ventricular enlargement with severely reduced right ventricular systolic function.  Severe left atrial enlargement.  Severe right atrial enlargement.  There is a 23 mm Freire II porcine bioprosthetic aortic valve present. There is trivial central aortic insufficiency present.  The aortic valve mean gradient is 6 mmHg with a dimensionless index of 0.27.  Moderate-to-severe mitral regurgitation.  Moderate to severe tricuspid regurgitation.  Moderate pulmonic regurgitation.  The estimated PA systolic pressure is greater than 54 mmHg.  There is pulmonary hypertension.    Echo 10/22  Concentric hypertrophy and severely decreased systolic function.  Severe left atrial enlargement.  The estimated ejection fraction is 20%.  Left ventricular diastolic dysfunction.  Normal right ventricular size with normal right ventricular systolic function.  Mild tricuspid regurgitation.      ECHO  3/21 (Care everywhere)  CONCLUSIONS:   - Exam indication: Initial evaluation of known cardiomyopathy   - The left ventricle is mildly dilated. There is left ventricular hypertrophy.   Left ventricular systolic function is severely decreased. EF = 26 ± 5% (2D 4-ch.)   Definity contrast used  for endocardial border detection.   - The right ventricle is dilated. Right ventricular systolic function is   moderately decreased.   - The left atrial cavity is severely dilated.   - The right atrial cavity is dilated.   - There is moderate (2+ - 3+) mitral valve regurgitation due to restricted leaflet    motion and apical tethering of normal mitral leaflet caused by LV enlargement.   Regurgitant orifice area (PISA) is 0.24 cm².   - Porcine prosthetic aortic valve. There is trace aortic valve regurgitation. The   peak gradient is 10 mmHg, the mean gradient is 5 mmHg and the dimensionless valve   index is 0.31.   - Estimated right ventricular systolic pressure is 42 mmHg plus right atrial   pressure. Estimated right atrial pressure is not included as the IVC was not seen.   - The patient has not had a prior CC echocardiographic exam for comparison.       STRESS TEST    [facility]      ROS: All 10 systems reviewed. Please refer to the HPI for pertinent positives. All other systems negative.     Past Medical History  Past Medical History:   Diagnosis Date    Aortic valve prosthesis present     CHF (congestive heart failure)     CKD (chronic kidney disease) stage 3, GFR 30-59 ml/min     Colon cancer     Hypertension     PAF (paroxysmal atrial fibrillation)        Surgical History  Past Surgical History:   Procedure Laterality Date    AORTIC VALVE REPLACEMENT  2016    COLON RESECTION            Allergies:   Review of patient's allergies indicates:   Allergen Reactions    Sacubitril-valsartan Hives and Swelling    Amlodipine Edema     Ankle    Lisinopril Other (See Comments)    Mefloquine Hives and Itching     Anti malarial      Spironolactone Edema       Social History:  Social History     Socioeconomic History    Marital status:    Tobacco Use    Smoking status: Former     Current packs/day: 0.00    Smokeless tobacco: Never   Substance and Sexual Activity    Alcohol use: Yes    Drug use: Never       Family  "History:  family history includes Colon cancer in his father; Hypertension in his mother.    Home Medications:  Current Outpatient Medications on File Prior to Visit   Medication Sig Dispense Refill    carvediloL (COREG) 12.5 MG tablet Take 25 mg by mouth 2 (two) times daily with meals.      coenzyme Q10 100 mg capsule Take 100 mg by mouth once daily.      LORazepam (ATIVAN) 0.5 MG tablet Take 1 mg by mouth 2 (two) times daily.      olmesartan (BENICAR) 40 MG tablet Take 20 mg by mouth once daily.      torsemide (DEMADEX) 20 MG Tab Take 30 mg by mouth once daily.      tumeric-ging-olive-oreg-capryl 100 mg-150 mg- 50 mg-150 mg Cap Take 1 capsule by mouth once daily.      dapagliflozin (FARXIGA) 10 mg tablet Take 10 mg by mouth once daily.       No current facility-administered medications on file prior to visit.       Physical exam:  /80 (BP Location: Right arm, Patient Position: Sitting, BP Method: Medium (Manual))   Pulse 60   Ht 5' 10" (1.778 m)   Wt 71.4 kg (157 lb 6.5 oz)   SpO2 99%   BMI 22.59 kg/m²         General: Pt is a 82 y.o. year old male who is AAOx3, in NAD, is pleasant, well nourished, looks stated age  HEENT: PERRL, EOMI, Oral mucosa pink & moist  CVS  No abnormal cardiac pulsations noted on inspection. The apical impulse is normal on palpation, and is located in the left 5th intercostal space in the mid - clavicular line. No palpable thrills or abnormal pulsations noted. RR, S1 - S2 heard, no murmurs, rubs or gallops appreciated.   PUL :  NO wheezes, rales, crackles   ABD : BS +, soft. No tenderness elicited   LE : No C/C/E. Distal Pulses palpable B/L           LABS:    Chemistry:   Lab Results   Component Value Date     03/13/2023    K 4.9 03/13/2023     03/13/2023    CO2 25 03/13/2023    BUN 12 03/13/2023    CREATININE 0.9 03/13/2023    CALCIUM 9.3 03/13/2023     Cardiac Markers: No results found for: "CKTOTAL", "CKMB", "CKMBINDEX", "TROPONINI"  Cardiac Markers (Last 3): No " "results found for: "CKTOTAL", "CKMB", "CKMBINDEX", "TROPONINI"  CBC:   Lab Results   Component Value Date    WBC 4.24 03/10/2023    HGB 13.2 (L) 03/10/2023    HCT 40.2 03/10/2023    MCV 96 03/10/2023     03/10/2023     Lipids: No results found for: "CHOL", "TRIG", "HDL", "LDLDIRECT"  Coagulation:   Lab Results   Component Value Date    INR 1.2 10/06/2022    APTT 27.8 10/06/2022           Assessment      1. Presence of automatic (implantable) cardiac defibrillator    2. Chronic combined systolic and diastolic congestive heart failure    3. S/P AVR (aortic valve replacement)    4. RBBB    5. PVC's (premature ventricular contractions)    6. Primary hypertension    7. Stage 3a chronic kidney disease    8. Anxiety    9. Pulmonary hypertension    10. SOB (shortness of breath)    11. H/O multiple allergies    12. PAF (paroxysmal atrial fibrillation)    13. Nonadherence to medical treatment          Plan:    Shortness of breath/cough - resolved   Again, restart torsemide 1.5 mg daily (increase to 2 tablets if symptoms worsen)    Congestive heart failure s/p ICD  NYHA class III, stage C  Systolic heart failure with right ventricular failure  Continue carvedilol, Benicar  Stopped Farxiga, spironolactone due side effects   Stopped entresto - due to swelling (has allergy)  Taking Torsemide 30 mg     ICD implanted  F/u with device clinic   Follow up with Dr. Grider     Aortic valve replacement  Echo 12/22 with normal EF, valve looks to be functioning properly     PAF  Stopped Xarelto due side effects  Continue coreg     Hypertension  Stable   Continue above regimen       This note was prepared using voice recognition system and is likely to have sound alike errors that may have been overlooked even after proofreading.     I have reviewed all pertinent chart information.  Plans and recommendations have been formulated under my direct supervision. All questions answered and patient voiced understanding.   If symptoms " persist go to the ED.    RTC in 1 month        Tere Johns MD  Cardiology

## 2023-08-01 DIAGNOSIS — I48.0 PAF (PAROXYSMAL ATRIAL FIBRILLATION): Primary | ICD-10-CM

## 2023-08-02 ENCOUNTER — TELEPHONE (OUTPATIENT)
Dept: CARDIOLOGY | Facility: HOSPITAL | Age: 83
End: 2023-08-02
Payer: MEDICARE

## 2023-08-02 NOTE — TELEPHONE ENCOUNTER
----- Message from Blanka Santo MA sent at 8/1/2023  1:35 PM CDT -----  Regarding: RE: AF/AFL 11.5hrs.  Craig slaughter can you please give pt a call he has questions about when he went into afib and what time. He had multiple questions that I could not answer. Can you please help me out. He is worried.    ----- Message -----  From: Tere Johns MD  Sent: 8/1/2023   9:58 AM CDT  To: Blanka Santo MA  Subject: RE: AF/AFL 11.5hrs.                              We can try eliquis since he had side effects to xarelto. Will send this in to his pharmacy.   ----- Message -----  From: Blanka Santo MA  Sent: 8/1/2023   9:19 AM CDT  To: Tere Johns MD  Subject: RE: AF/AFL 11.5hrs.                              Spoke with patient let him know he is going in and out of Afib. He is okay with starting blood thinner. Please send into [facility] in [place]. Thanks.   ----- Message -----  From: Tere Johns MD  Sent: 8/1/2023   9:09 AM CDT  To: Blanka Santo MA  Subject: RE: AF/AFL 11.5hrs.                              Call patient. Let him know his device is showing that he has been going in and out of atrial fibrillation. Since he's not on a blood thinner he is at risk for stroke. If he would like to be started on a blood thinner, please let us know as soon as possible.   ----- Message -----  From: Ally Sifuentes RN  Sent: 8/1/2023   8:18 AM CDT  To: Amos Grider MD; #  Subject: RE: AF/AFL 11.5hrs.                              Max v-rate during AF was 116bpm. Mean v-rate during AF was 77bpm, so it appears mostly controlled.   ----- Message -----  From: Tere Johns MD  Sent: 7/31/2023   1:08 PM CDT  To: Amos Grider MD; Ally Sifuentes RN  Subject: RE: AF/AFL 11.5hrs.                              Ok thanks for the info. Does it say how high his heart rate has gotten while in afib?  ----- Message -----  From: Ally Sifuentes RN  Sent: 7/31/2023  10:42 AM CDT  To: Amos  "STANISLAW Grider MD; #  Subject: AF/AFL 11.5hrs.                                  Hi Dr. Johns,    I saw this pt has an appt with you today for "extreme shortness of breath." I received the following Intellicyt report on him this morning:  "Atrial Fibrillation/Flutter: up to 22 per day  EGMs illustrate AF/AFL with intermittent undersensing, longest on 7/28/23 at 7:08pm , 6hrs 28minin duration. AF burden up to 70% of day    Longest episode per today's report is just under 11.5hrs. Pt has device check on 8/7 and we will address atrial undersensing then. Pt does not take OAC per EMR. Called pt and left VM on 7/31 for symptom/med check. I wanted to make you aware.     Thanks,  Ally Sifuentes                "

## 2023-08-02 NOTE — TELEPHONE ENCOUNTER
Called pt in reference to the following message from Blanka. Pt states Nurse Nima answered all of his questions this morning but he wanted to know if he could still walk for exercise. RN advised pt to listen to his body if he needs to take a break or slow down. Pt verbalized understanding. Pt reports SOB occurred before AF started this Friday.

## 2023-08-07 ENCOUNTER — PATIENT MESSAGE (OUTPATIENT)
Dept: CARDIOLOGY | Facility: CLINIC | Age: 83
End: 2023-08-07
Payer: MEDICARE

## 2023-08-07 ENCOUNTER — HOSPITAL ENCOUNTER (OUTPATIENT)
Dept: CARDIOLOGY | Facility: HOSPITAL | Age: 83
Discharge: HOME | End: 2023-08-07
Attending: INTERNAL MEDICINE
Payer: MEDICARE

## 2023-08-07 ENCOUNTER — TELEPHONE (OUTPATIENT)
Dept: CARDIOLOGY | Facility: CLINIC | Age: 83
End: 2023-08-07
Payer: MEDICARE

## 2023-08-07 DIAGNOSIS — I50.42 CHRONIC COMBINED SYSTOLIC AND DIASTOLIC CONGESTIVE HEART FAILURE: ICD-10-CM

## 2023-08-07 DIAGNOSIS — Z95.810 ICD (IMPLANTABLE CARDIOVERTER-DEFIBRILLATOR) IN PLACE: ICD-10-CM

## 2023-08-07 PROCEDURE — 93282 CARDIAC DEVICE CHECK - IN CLINIC & HOSPITAL: ICD-10-PCS | Mod: 26,,, | Performed by: INTERNAL MEDICINE

## 2023-08-07 PROCEDURE — 93282 PRGRMG EVAL IMPLANTABLE DFB: CPT

## 2023-08-07 PROCEDURE — 93282 PRGRMG EVAL IMPLANTABLE DFB: CPT | Mod: 26,,, | Performed by: INTERNAL MEDICINE

## 2023-08-07 RX ORDER — MOMETASONE FUROATE 1 MG/G
CREAM TOPICAL
COMMUNITY
Start: 2023-07-06

## 2023-08-07 NOTE — TELEPHONE ENCOUNTER
Needs to discuss/confirm Saravanan/Cv for A-Fib  Telephoned patient to schedule/confirm Saravanan/Cv on Wednesday 8/9 for 12noon  Advised 1030 admit reviewed all instructions, medications, arranging transportation home

## 2023-08-08 ENCOUNTER — TELEPHONE (OUTPATIENT)
Dept: CARDIOLOGY | Facility: CLINIC | Age: 83
End: 2023-08-08
Payer: MEDICARE

## 2023-08-08 NOTE — TELEPHONE ENCOUNTER
Gonzalez FOR PT TO CALL US BACK    ----- Message from Sarah Ledesma sent at 8/8/2023  2:36 PM CDT -----  Contact: [facility]AB[facility]  Patient is calling to speak with the office regarding wanting to give information to  For upcoming surgery. Please call .[phone]        Thanks

## 2023-08-08 NOTE — TELEPHONE ENCOUNTER
Telephoned patient to review instructions for Saravanan guided Cardioversion again and he stated he believes he's allergic to Eliquis and experienced swelling and pain on the right hand.  Wanted to know if Denny Johns or Tayla were going to check his right hand/arm also stated he would take half tablet of Eliquis until this is checked.  Advised and stressed importance of Oral anticoagulation also discussed alternative drug to Xarelto and Eliquis would be Coumadin. He is ready to proceed with Saravanan guided cardioversion tomorrow at 12 with 1030am arrival time

## 2023-08-09 ENCOUNTER — HOSPITAL ENCOUNTER (OUTPATIENT)
Facility: HOSPITAL | Age: 83
Discharge: HOME | End: 2023-08-09
Attending: STUDENT IN AN ORGANIZED HEALTH CARE EDUCATION/TRAINING PROGRAM | Admitting: STUDENT IN AN ORGANIZED HEALTH CARE EDUCATION/TRAINING PROGRAM
Payer: MEDICARE

## 2023-08-09 ENCOUNTER — ANESTHESIA EVENT (OUTPATIENT)
Dept: CARDIOLOGY | Facility: HOSPITAL | Age: 83
End: 2023-08-09
Payer: MEDICARE

## 2023-08-09 ENCOUNTER — ANESTHESIA (OUTPATIENT)
Dept: CARDIOLOGY | Facility: HOSPITAL | Age: 83
End: 2023-08-09
Payer: MEDICARE

## 2023-08-09 VITALS
TEMPERATURE: 99 F | HEART RATE: 74 BPM | BODY MASS INDEX: 22.54 KG/M2 | RESPIRATION RATE: 25 BRPM | WEIGHT: 157.44 LBS | HEIGHT: 70 IN | DIASTOLIC BLOOD PRESSURE: 89 MMHG | SYSTOLIC BLOOD PRESSURE: 138 MMHG | OXYGEN SATURATION: 100 %

## 2023-08-09 DIAGNOSIS — I48.0 PAF (PAROXYSMAL ATRIAL FIBRILLATION): ICD-10-CM

## 2023-08-09 DIAGNOSIS — M79.89 ARM SWELLING: Primary | ICD-10-CM

## 2023-08-09 LAB
BASOPHILS # BLD AUTO: 0.02 K/UL (ref 0–0.2)
BASOPHILS NFR BLD: 0.2 % (ref 0–1.9)
DIFFERENTIAL METHOD: ABNORMAL
EOSINOPHIL # BLD AUTO: 0.1 K/UL (ref 0–0.5)
EOSINOPHIL NFR BLD: 0.6 % (ref 0–8)
ERYTHROCYTE [DISTWIDTH] IN BLOOD BY AUTOMATED COUNT: 13.7 % (ref 11.5–14.5)
HCT VFR BLD AUTO: 41.2 % (ref 40–54)
HGB BLD-MCNC: 14 G/DL (ref 14–18)
IMM GRANULOCYTES # BLD AUTO: 0.02 K/UL (ref 0–0.04)
IMM GRANULOCYTES NFR BLD AUTO: 0.2 % (ref 0–0.5)
LYMPHOCYTES # BLD AUTO: 1.2 K/UL (ref 1–4.8)
LYMPHOCYTES NFR BLD: 14.5 % (ref 18–48)
MCH RBC QN AUTO: 32 PG (ref 27–31)
MCHC RBC AUTO-ENTMCNC: 34 G/DL (ref 32–36)
MCV RBC AUTO: 94 FL (ref 82–98)
MONOCYTES # BLD AUTO: 1.2 K/UL (ref 0.3–1)
MONOCYTES NFR BLD: 13.7 % (ref 4–15)
NEUTROPHILS # BLD AUTO: 6 K/UL (ref 1.8–7.7)
NEUTROPHILS NFR BLD: 70.8 % (ref 38–73)
NRBC BLD-RTO: 0 /100 WBC
PLATELET # BLD AUTO: 130 K/UL (ref 150–450)
PMV BLD AUTO: 12.8 FL (ref 9.2–12.9)
RBC # BLD AUTO: 4.38 M/UL (ref 4.6–6.2)
WBC # BLD AUTO: 8.48 K/UL (ref 3.9–12.7)

## 2023-08-09 PROCEDURE — 93010 ELECTROCARDIOGRAM REPORT: CPT | Mod: ,,, | Performed by: INTERNAL MEDICINE

## 2023-08-09 PROCEDURE — 25000003 PHARM REV CODE 250: Performed by: NURSE ANESTHETIST, CERTIFIED REGISTERED

## 2023-08-09 PROCEDURE — 63600175 PHARM REV CODE 636 W HCPCS: Performed by: NURSE ANESTHETIST, CERTIFIED REGISTERED

## 2023-08-09 PROCEDURE — 93005 ELECTROCARDIOGRAM TRACING: CPT

## 2023-08-09 PROCEDURE — 37000009 HC ANESTHESIA EA ADD 15 MINS: Performed by: STUDENT IN AN ORGANIZED HEALTH CARE EDUCATION/TRAINING PROGRAM

## 2023-08-09 PROCEDURE — 37000008 HC ANESTHESIA 1ST 15 MINUTES: Performed by: STUDENT IN AN ORGANIZED HEALTH CARE EDUCATION/TRAINING PROGRAM

## 2023-08-09 PROCEDURE — 85025 COMPLETE CBC W/AUTO DIFF WBC: CPT | Performed by: STUDENT IN AN ORGANIZED HEALTH CARE EDUCATION/TRAINING PROGRAM

## 2023-08-09 PROCEDURE — 93010 EKG 12-LEAD: ICD-10-PCS | Mod: ,,, | Performed by: INTERNAL MEDICINE

## 2023-08-09 PROCEDURE — 25500020 PHARM REV CODE 255: Performed by: STUDENT IN AN ORGANIZED HEALTH CARE EDUCATION/TRAINING PROGRAM

## 2023-08-09 RX ORDER — PROPOFOL 10 MG/ML
VIAL (ML) INTRAVENOUS
Status: DISCONTINUED | OUTPATIENT
Start: 2023-08-09 | End: 2023-08-09

## 2023-08-09 RX ORDER — LIDOCAINE HYDROCHLORIDE 20 MG/ML
INJECTION INTRAVENOUS
Status: DISCONTINUED | OUTPATIENT
Start: 2023-08-09 | End: 2023-08-09

## 2023-08-09 RX ADMIN — LIDOCAINE HYDROCHLORIDE 50 MG: 20 INJECTION INTRAVENOUS at 12:08

## 2023-08-09 RX ADMIN — PROPOFOL 50 MG: 10 INJECTION, EMULSION INTRAVENOUS at 01:08

## 2023-08-09 RX ADMIN — BENZOCAINE, BUTAMBEN, AND TETRACAINE HYDROCHLORIDE 1 SPRAY: .028; .004; .004 AEROSOL, SPRAY TOPICAL at 01:08

## 2023-08-09 RX ADMIN — SODIUM CHLORIDE: 9 INJECTION, SOLUTION INTRAVENOUS at 12:08

## 2023-08-09 RX ADMIN — HUMAN ALBUMIN MICROSPHERES AND PERFLUTREN 0.11 MG: 10; .22 INJECTION, SOLUTION INTRAVENOUS at 01:08

## 2023-08-09 RX ADMIN — PROPOFOL 25 MG: 10 INJECTION, EMULSION INTRAVENOUS at 01:08

## 2023-08-09 NOTE — ANESTHESIA PREPROCEDURE EVALUATION
08/09/2023  Edmund Chu is a 82 y.o., male.    Patient Active Problem List   Diagnosis    Hypertension    Chronic combined systolic and diastolic congestive heart failure    S/P AVR (aortic valve replacement)    ICD (implantable cardioverter-defibrillator) in place    DIVYA (obstructive sleep apnea)    SOB (shortness of breath)    Other insomnia    Pulmonary hypertension    PVC's (premature ventricular contractions)    RBBB    SBO (small bowel obstruction)    Hyperkalemia    Hypercalcemia    CKD (chronic kidney disease), stage III    Anxiety    Pre-operative clearance       Past Surgical History:   Procedure Laterality Date    AORTIC VALVE REPLACEMENT  2016    COLON RESECTION         Results for orders placed during the hospital encounter of 12/19/22    Echo    Interpretation Summary  · The left ventricle is mildly enlarged with severely decreased systolic function.  · The estimated ejection fraction is 15 - 20%.  · Grade II left ventricular diastolic dysfunction.  · Moderate right ventricular enlargement with severely reduced right ventricular systolic function.  · Severe left atrial enlargement.  · Severe right atrial enlargement.  · There is a 23 mm Freire II porcine bioprosthetic aortic valve present. There is trivial central aortic insufficiency present.  · The aortic valve mean gradient is 6 mmHg with a dimensionless index of 0.27.  · Moderate-to-severe mitral regurgitation.  · Moderate to severe tricuspid regurgitation.  · Moderate pulmonic regurgitation.  · The estimated PA systolic pressure is greater than 54 mmHg.  · There is pulmonary hypertension.    Pre-op Assessment    I have reviewed the Patient Summary Reports.     I have reviewed the Nursing Notes. I have reviewed the NPO Status.      Review of Systems  Anesthesia Hx:  No problems with previous Anesthesia     Cardiovascular:   Hypertension Dysrhythmias CHF    Pulmonary:   Shortness of breath Sleep Apnea    Renal/:   Chronic Renal Disease        Physical Exam  General: Well nourished, Cooperative, Alert and Oriented    Airway:  Mallampati: II   Mouth Opening: Normal  TM Distance: Normal  Tongue: Normal  Neck ROM: Normal ROM    Dental:  Intact, Caps / Implants  Crowns upper two front teeth       Anesthesia Plan  Type of Anesthesia, risks & benefits discussed:    Anesthesia Type: MAC  Intra-op Monitoring Plan: Standard ASA Monitors  Post Op Pain Control Plan: multimodal analgesia  Induction:  IV  Informed Consent: Informed consent signed with the Patient and all parties understand the risks and agree with anesthesia plan.  All questions answered.   ASA Score: 4  Day of Surgery Review of History & Physical: H&P Update referred to the surgeon/provider.    Ready For Surgery From Anesthesia Perspective.     .

## 2023-08-09 NOTE — ANESTHESIA POSTPROCEDURE EVALUATION
Anesthesia Post Evaluation    Patient: Edmund Chu    Procedure(s) Performed: Procedure(s) (LRB):  Transesophageal echo ([facility]) intra-procedure log documentation (N/A)    Final Anesthesia Type: MAC      Patient location during evaluation: Cath Lab  Patient participation: Yes- Able to Participate  Level of consciousness: awake and alert  Post-procedure vital signs: reviewed and stable  Pain management: adequate  Airway patency: patent    PONV status at discharge: No PONV  Anesthetic complications: no      Cardiovascular status: blood pressure returned to baseline, hemodynamically stable and stable  Respiratory status: unassisted, spontaneous ventilation and nasal cannula  Hydration status: euvolemic  Follow-up not needed.          Vitals Value Taken Time   /103 08/09/23 1220   Temp 37.2 °C (99 °F) 08/09/23 1220   Pulse 93 08/09/23 1220   Resp 15 08/09/23 1220   SpO2 100 % 08/09/23 1220         No case tracking events are documented in the log.      Pain/Sheyla Score: No data recorded

## 2023-08-09 NOTE — PLAN OF CARE
Went over discharge instructions with patient.   Stressed importance of making and keeping all follow ups as well as making prescribed medication changes.   Gave education material for safe mobility after discharge.   Walked patient to ensure patient was safe to go home.   Patient verbalized understanding and has no questions in regards to discharge.  IV removed, catheter intact.  Primary nurse notified of pt's discharge status.   Made sure patient has someone to drive them and explained not to drive for 24 hours.   Defib pads removed from patient chest and back.

## 2023-08-09 NOTE — TRANSFER OF CARE
"Anesthesia Transfer of Care Note    Patient: Edmund Chu    Procedure(s) Performed: Procedure(s) (LRB):  Transesophageal echo ([facility]) intra-procedure log documentation (N/A)    Patient location: Other:    Anesthesia Type: MAC    Post pain: adequate analgesia    Post assessment: no apparent anesthetic complications and tolerated procedure well    Post vital signs: stable    Level of consciousness: responds to stimulation    Nausea/Vomiting: no nausea/vomiting    Complications: none    Transfer of care protocol was followed      Last vitals:   Visit Vitals  BP (!) 164/103 (BP Location: Left arm, Patient Position: Lying)   Pulse 93   Temp 37.2 °C (99 °F) (Temporal)   Resp 15   Ht 5' 10" (1.778 m)   Wt 71.4 kg (157 lb 6.5 oz)   SpO2 100%   BMI 22.59 kg/m²     "

## 2023-08-09 NOTE — INTERVAL H&P NOTE
The patient has been examined and the H&P has been reviewed:    I concur with the findings and no changes have occurred since H&P was written.    Anesthesia risks, benefits and alternative options discussed and understood by patient/family.      Per device interrogation, patient has been in atrial fibrillation since the end of July.  Has become more short of breath.    Was started on Eliquis.    Patient reports arm swelling on Eliquis.  Patient has history of allergies to many medications.    Patient told to stop Eliquis and start Xarelto which he gets eye itching with.  Denies issues with swallowing, bleeding issues, any neck injury/surgeries.  Risks and benefits of procedure discussed with patient        There are no hospital problems to display for this patient.

## 2023-08-10 ENCOUNTER — HOSPITAL ENCOUNTER (OUTPATIENT)
Dept: RADIOLOGY | Facility: HOSPITAL | Age: 83
Discharge: HOME | End: 2023-08-10
Attending: STUDENT IN AN ORGANIZED HEALTH CARE EDUCATION/TRAINING PROGRAM
Payer: MEDICARE

## 2023-08-10 DIAGNOSIS — M79.89 ARM SWELLING: ICD-10-CM

## 2023-08-10 DIAGNOSIS — M70.21 OLECRANON BURSITIS OF RIGHT ELBOW: Primary | ICD-10-CM

## 2023-08-10 LAB — BSA FOR ECHO PROCEDURE: 1.88 M2

## 2023-08-10 PROCEDURE — 76882 US LMTD JT/FCL EVL NVASC XTR: CPT | Mod: TC,RT

## 2023-08-10 PROCEDURE — 76882 US SOFT TISSUE, UPPER EXTREMITY, RIGHT: ICD-10-PCS | Mod: 26,RT,, | Performed by: RADIOLOGY

## 2023-08-10 PROCEDURE — 76882 US LMTD JT/FCL EVL NVASC XTR: CPT | Mod: 26,RT,, | Performed by: RADIOLOGY

## 2023-08-18 ENCOUNTER — TELEPHONE (OUTPATIENT)
Dept: CARDIOLOGY | Facility: CLINIC | Age: 83
End: 2023-08-18
Payer: MEDICARE

## 2023-08-18 NOTE — TELEPHONE ENCOUNTER
Can patient be seen today???    He needs to stay on Xarelto given recent cardioversion, however if he is having large amounts of blood-tinged sputum or coughing up clots, he should be evaluated in the ED.    Thanks   [id number] - High Complexity

## 2023-08-18 NOTE — TELEPHONE ENCOUNTER
Spoke with patient he states he has small amount of blood in his phlegm. He wants to know if he should continue the xarelto if its just a side effect of the medication.   Please advise.     ---- Message from Robert Otero sent at 8/18/2023 10:06 AM CDT -----  Contact: Edmund  .Type:  Needs Medical Advice    Who Called: Edmund  Symptoms (please be specific): medication: rivaroxaban (XARELTO) 10 mg Tab  Would the patient rather a call back or a response via [facility]? Call back or [facility] message if pt misses call  Best Call Back Number: [phone]  Additional Information: pt states medication he was prescribed is causing him to cough up small pieces of blood in his spit and is needing to know if he should continue the medication or reduce dosage              Thanks  MIREYA

## 2023-08-22 ENCOUNTER — PATIENT MESSAGE (OUTPATIENT)
Dept: CARDIOLOGY | Facility: CLINIC | Age: 83
End: 2023-08-22
Payer: MEDICARE

## 2023-08-25 ENCOUNTER — OFFICE VISIT (OUTPATIENT)
Dept: CARDIOLOGY | Facility: CLINIC | Age: 83
End: 2023-08-25
Payer: MEDICARE

## 2023-08-25 VITALS
OXYGEN SATURATION: 94 % | WEIGHT: 153.69 LBS | SYSTOLIC BLOOD PRESSURE: 112 MMHG | HEIGHT: 70 IN | BODY MASS INDEX: 22 KG/M2 | DIASTOLIC BLOOD PRESSURE: 68 MMHG | HEART RATE: 75 BPM

## 2023-08-25 DIAGNOSIS — I48.0 PAF (PAROXYSMAL ATRIAL FIBRILLATION): ICD-10-CM

## 2023-08-25 DIAGNOSIS — I10 PRIMARY HYPERTENSION: ICD-10-CM

## 2023-08-25 DIAGNOSIS — Z95.2 S/P AVR (AORTIC VALVE REPLACEMENT): ICD-10-CM

## 2023-08-25 DIAGNOSIS — G47.33 OSA (OBSTRUCTIVE SLEEP APNEA): ICD-10-CM

## 2023-08-25 DIAGNOSIS — Z95.810 ICD (IMPLANTABLE CARDIOVERTER-DEFIBRILLATOR) IN PLACE: ICD-10-CM

## 2023-08-25 DIAGNOSIS — I45.10 RBBB: ICD-10-CM

## 2023-08-25 DIAGNOSIS — N18.31 STAGE 3A CHRONIC KIDNEY DISEASE: ICD-10-CM

## 2023-08-25 DIAGNOSIS — I49.3 PVC'S (PREMATURE VENTRICULAR CONTRACTIONS): ICD-10-CM

## 2023-08-25 DIAGNOSIS — I48.3 TYPICAL ATRIAL FLUTTER: ICD-10-CM

## 2023-08-25 DIAGNOSIS — I50.42 CHRONIC COMBINED SYSTOLIC AND DIASTOLIC CONGESTIVE HEART FAILURE: Primary | ICD-10-CM

## 2023-08-25 PROCEDURE — 99215 OFFICE O/P EST HI 40 MIN: CPT | Mod: S$PBB,,, | Performed by: INTERNAL MEDICINE

## 2023-08-25 PROCEDURE — 99215 PR OFFICE/OUTPT VISIT, EST, LEVL V, 40-54 MIN: ICD-10-PCS | Mod: S$PBB,,, | Performed by: INTERNAL MEDICINE

## 2023-08-25 PROCEDURE — 99999 PR PBB SHADOW E&M-EST. PATIENT-LVL III: ICD-10-PCS | Mod: PBBFAC,,, | Performed by: INTERNAL MEDICINE

## 2023-08-25 PROCEDURE — 99213 OFFICE O/P EST LOW 20 MIN: CPT | Mod: PBBFAC | Performed by: INTERNAL MEDICINE

## 2023-08-25 PROCEDURE — 99999 PR PBB SHADOW E&M-EST. PATIENT-LVL III: CPT | Mod: PBBFAC,,, | Performed by: INTERNAL MEDICINE

## 2023-09-02 ENCOUNTER — HOSPITAL ENCOUNTER (INPATIENT)
Facility: HOSPITAL | Age: 83
LOS: 2 days | Discharge: HOME | DRG: 389 | End: 2023-09-04
Attending: EMERGENCY MEDICINE | Admitting: FAMILY MEDICINE
Payer: MEDICARE

## 2023-09-02 DIAGNOSIS — R07.9 CHEST PAIN: ICD-10-CM

## 2023-09-02 DIAGNOSIS — K56.609 SBO (SMALL BOWEL OBSTRUCTION): Primary | ICD-10-CM

## 2023-09-02 PROBLEM — I48.0 PAF (PAROXYSMAL ATRIAL FIBRILLATION): Status: ACTIVE | Noted: 2023-09-02

## 2023-09-02 LAB
ALBUMIN SERPL BCP-MCNC: 4.5 G/DL (ref 3.5–5.2)
ALP SERPL-CCNC: 136 U/L (ref 55–135)
ALT SERPL W/O P-5'-P-CCNC: 20 U/L (ref 10–44)
ANION GAP SERPL CALC-SCNC: 16 MMOL/L (ref 8–16)
AST SERPL-CCNC: 34 U/L (ref 10–40)
BACTERIA #/AREA URNS HPF: ABNORMAL /HPF
BASOPHILS # BLD AUTO: 0.02 K/UL (ref 0–0.2)
BASOPHILS NFR BLD: 0.2 % (ref 0–1.9)
BILIRUB SERPL-MCNC: 1.4 MG/DL (ref 0.1–1)
BILIRUB UR QL STRIP: NEGATIVE
BUN SERPL-MCNC: 24 MG/DL (ref 8–23)
CALCIUM SERPL-MCNC: 11 MG/DL (ref 8.7–10.5)
CHLORIDE SERPL-SCNC: 93 MMOL/L (ref 95–110)
CLARITY UR: CLEAR
CO2 SERPL-SCNC: 26 MMOL/L (ref 23–29)
COLOR UR: YELLOW
CREAT SERPL-MCNC: 1.5 MG/DL (ref 0.5–1.4)
DIFFERENTIAL METHOD: ABNORMAL
EOSINOPHIL # BLD AUTO: 0 K/UL (ref 0–0.5)
EOSINOPHIL NFR BLD: 0 % (ref 0–8)
ERYTHROCYTE [DISTWIDTH] IN BLOOD BY AUTOMATED COUNT: 13.2 % (ref 11.5–14.5)
EST. GFR  (NO RACE VARIABLE): 47 ML/MIN/1.73 M^2
GLUCOSE SERPL-MCNC: 131 MG/DL (ref 70–110)
GLUCOSE UR QL STRIP: NEGATIVE
HCT VFR BLD AUTO: 46.4 % (ref 40–54)
HGB BLD-MCNC: 16 G/DL (ref 14–18)
HGB UR QL STRIP: NEGATIVE
HYALINE CASTS #/AREA URNS LPF: 3 /LPF
IMM GRANULOCYTES # BLD AUTO: 0.03 K/UL (ref 0–0.04)
IMM GRANULOCYTES NFR BLD AUTO: 0.2 % (ref 0–0.5)
KETONES UR QL STRIP: ABNORMAL
LEUKOCYTE ESTERASE UR QL STRIP: NEGATIVE
LIPASE SERPL-CCNC: 19 U/L (ref 4–60)
LYMPHOCYTES # BLD AUTO: 0.4 K/UL (ref 1–4.8)
LYMPHOCYTES NFR BLD: 2.7 % (ref 18–48)
MCH RBC QN AUTO: 32.2 PG (ref 27–31)
MCHC RBC AUTO-ENTMCNC: 34.5 G/DL (ref 32–36)
MCV RBC AUTO: 93 FL (ref 82–98)
MICROSCOPIC COMMENT: ABNORMAL
MONOCYTES # BLD AUTO: 0.7 K/UL (ref 0.3–1)
MONOCYTES NFR BLD: 5.1 % (ref 4–15)
NEUTROPHILS # BLD AUTO: 12 K/UL (ref 1.8–7.7)
NEUTROPHILS NFR BLD: 91.8 % (ref 38–73)
NITRITE UR QL STRIP: NEGATIVE
NRBC BLD-RTO: 0 /100 WBC
PH UR STRIP: 7 [PH] (ref 5–8)
PLATELET # BLD AUTO: 147 K/UL (ref 150–450)
PMV BLD AUTO: 10.6 FL (ref 9.2–12.9)
POTASSIUM SERPL-SCNC: 4.9 MMOL/L (ref 3.5–5.1)
PROT SERPL-MCNC: 9.8 G/DL (ref 6–8.4)
PROT UR QL STRIP: ABNORMAL
RBC # BLD AUTO: 4.97 M/UL (ref 4.6–6.2)
RBC #/AREA URNS HPF: 0 /HPF (ref 0–4)
SODIUM SERPL-SCNC: 135 MMOL/L (ref 136–145)
SP GR UR STRIP: 1.01 (ref 1–1.03)
URN SPEC COLLECT METH UR: ABNORMAL
UROBILINOGEN UR STRIP-ACNC: NEGATIVE EU/DL
WBC # BLD AUTO: 13.01 K/UL (ref 3.9–12.7)
WBC #/AREA URNS HPF: 0 /HPF (ref 0–5)

## 2023-09-02 PROCEDURE — 96361 HYDRATE IV INFUSION ADD-ON: CPT

## 2023-09-02 PROCEDURE — 80053 COMPREHEN METABOLIC PANEL: CPT | Performed by: EMERGENCY MEDICINE

## 2023-09-02 PROCEDURE — A9698 NON-RAD CONTRAST MATERIALNOC: HCPCS | Performed by: EMERGENCY MEDICINE

## 2023-09-02 PROCEDURE — 96375 TX/PRO/DX INJ NEW DRUG ADDON: CPT

## 2023-09-02 PROCEDURE — 96374 THER/PROPH/DIAG INJ IV PUSH: CPT

## 2023-09-02 PROCEDURE — 99223 PR INITIAL HOSPITAL CARE,LEVL III: ICD-10-PCS | Mod: ,,, | Performed by: SURGERY

## 2023-09-02 PROCEDURE — 25500020 PHARM REV CODE 255: Performed by: EMERGENCY MEDICINE

## 2023-09-02 PROCEDURE — 81000 URINALYSIS NONAUTO W/SCOPE: CPT | Performed by: EMERGENCY MEDICINE

## 2023-09-02 PROCEDURE — 11000001 HC ACUTE MED/SURG PRIVATE ROOM

## 2023-09-02 PROCEDURE — 83690 ASSAY OF LIPASE: CPT | Performed by: EMERGENCY MEDICINE

## 2023-09-02 PROCEDURE — 99223 1ST HOSP IP/OBS HIGH 75: CPT | Mod: ,,, | Performed by: SURGERY

## 2023-09-02 PROCEDURE — 25000003 PHARM REV CODE 250: Performed by: NURSE PRACTITIONER

## 2023-09-02 PROCEDURE — 63600175 PHARM REV CODE 636 W HCPCS: Performed by: EMERGENCY MEDICINE

## 2023-09-02 PROCEDURE — 99285 EMERGENCY DEPT VISIT HI MDM: CPT | Mod: 25

## 2023-09-02 PROCEDURE — 85025 COMPLETE CBC W/AUTO DIFF WBC: CPT | Performed by: EMERGENCY MEDICINE

## 2023-09-02 RX ORDER — SODIUM CHLORIDE 9 MG/ML
INJECTION, SOLUTION INTRAVENOUS CONTINUOUS
Status: DISCONTINUED | OUTPATIENT
Start: 2023-09-02 | End: 2023-09-03

## 2023-09-02 RX ORDER — IBUPROFEN 200 MG
24 TABLET ORAL
Status: DISCONTINUED | OUTPATIENT
Start: 2023-09-02 | End: 2023-09-04 | Stop reason: HOSPADM

## 2023-09-02 RX ORDER — CARVEDILOL 6.25 MG/1
25 TABLET ORAL 2 TIMES DAILY WITH MEALS
Status: DISCONTINUED | OUTPATIENT
Start: 2023-09-02 | End: 2023-09-02

## 2023-09-02 RX ORDER — MORPHINE SULFATE 4 MG/ML
4 INJECTION, SOLUTION INTRAMUSCULAR; INTRAVENOUS
Status: COMPLETED | OUTPATIENT
Start: 2023-09-02 | End: 2023-09-02

## 2023-09-02 RX ORDER — HYDRALAZINE HYDROCHLORIDE 20 MG/ML
10 INJECTION INTRAMUSCULAR; INTRAVENOUS EVERY 6 HOURS PRN
Status: DISCONTINUED | OUTPATIENT
Start: 2023-09-02 | End: 2023-09-04 | Stop reason: HOSPADM

## 2023-09-02 RX ORDER — SODIUM CHLORIDE 0.9 % (FLUSH) 0.9 %
10 SYRINGE (ML) INJECTION EVERY 12 HOURS PRN
Status: DISCONTINUED | OUTPATIENT
Start: 2023-09-02 | End: 2023-09-04 | Stop reason: HOSPADM

## 2023-09-02 RX ORDER — ONDANSETRON 2 MG/ML
4 INJECTION INTRAMUSCULAR; INTRAVENOUS EVERY 8 HOURS PRN
Status: DISCONTINUED | OUTPATIENT
Start: 2023-09-02 | End: 2023-09-04 | Stop reason: HOSPADM

## 2023-09-02 RX ORDER — ONDANSETRON 2 MG/ML
4 INJECTION INTRAMUSCULAR; INTRAVENOUS
Status: COMPLETED | OUTPATIENT
Start: 2023-09-02 | End: 2023-09-02

## 2023-09-02 RX ORDER — ACETAMINOPHEN 325 MG/1
650 TABLET ORAL EVERY 4 HOURS PRN
Status: DISCONTINUED | OUTPATIENT
Start: 2023-09-02 | End: 2023-09-04 | Stop reason: HOSPADM

## 2023-09-02 RX ORDER — LORAZEPAM 0.5 MG/1
1 TABLET ORAL ONCE
Status: DISCONTINUED | OUTPATIENT
Start: 2023-09-02 | End: 2023-09-03

## 2023-09-02 RX ORDER — METOPROLOL TARTRATE 1 MG/ML
5 INJECTION, SOLUTION INTRAVENOUS EVERY 8 HOURS
Status: DISCONTINUED | OUTPATIENT
Start: 2023-09-02 | End: 2023-09-02

## 2023-09-02 RX ORDER — NALOXONE HCL 0.4 MG/ML
0.02 VIAL (ML) INJECTION
Status: DISCONTINUED | OUTPATIENT
Start: 2023-09-02 | End: 2023-09-04 | Stop reason: HOSPADM

## 2023-09-02 RX ORDER — GLUCAGON 1 MG
1 KIT INJECTION
Status: DISCONTINUED | OUTPATIENT
Start: 2023-09-02 | End: 2023-09-04 | Stop reason: HOSPADM

## 2023-09-02 RX ORDER — IBUPROFEN 200 MG
16 TABLET ORAL
Status: DISCONTINUED | OUTPATIENT
Start: 2023-09-02 | End: 2023-09-04 | Stop reason: HOSPADM

## 2023-09-02 RX ORDER — HYDROCODONE BITARTRATE AND ACETAMINOPHEN 5; 325 MG/1; MG/1
1 TABLET ORAL EVERY 6 HOURS PRN
Status: DISCONTINUED | OUTPATIENT
Start: 2023-09-02 | End: 2023-09-04 | Stop reason: HOSPADM

## 2023-09-02 RX ADMIN — ONDANSETRON 4 MG: 2 INJECTION INTRAMUSCULAR; INTRAVENOUS at 09:09

## 2023-09-02 RX ADMIN — IOHEXOL 100 ML: 350 INJECTION, SOLUTION INTRAVENOUS at 10:09

## 2023-09-02 RX ADMIN — SODIUM CHLORIDE 1000 ML: 9 INJECTION, SOLUTION INTRAVENOUS at 11:09

## 2023-09-02 RX ADMIN — IOHEXOL 1000 ML: 9 SOLUTION ORAL at 10:09

## 2023-09-02 RX ADMIN — MORPHINE SULFATE 4 MG: 4 INJECTION INTRAVENOUS at 09:09

## 2023-09-02 RX ADMIN — SODIUM CHLORIDE: 9 INJECTION, SOLUTION INTRAVENOUS at 07:09

## 2023-09-02 NOTE — ED PROVIDER NOTES
SCRIBE #1 NOTE: IBertha, cecilio scribing for, and in the presence of, Santana Raman Jr., MD. I have scribed the entire note.       History     Chief Complaint   Patient presents with    Abdominal Pain     Started 7pm last night,      Review of patient's allergies indicates:   Allergen Reactions    Eliquis [apixaban] Swelling    Sacubitril-valsartan Hives and Swelling    Amlodipine Edema     Ankle    Lisinopril Other (See Comments)    Mefloquine Hives and Itching     Anti malarial      Spironolactone Edema         History of Present Illness     HPI    9/2/2023, 8:16 AM  History obtained from the patient      History of Present Illness: Edmund Chu is a 82 y.o. male patient with a PMHx of HTN, CHF, colon cancer, PAF, and CKD who presents to the Emergency Department for evaluation of abd pain which onset last night. Pt reports abd pain is centralized at abdominal hernia. Symptoms are constant and moderate in severity. No mitigating or exacerbating factors reported. Associated sxs include n/v. Pt reports 1 episode of emesis. Patient denies any fever, chills, diarrhea, blood in stool, dysuria, and all other sxs at this time. No further complaints or concerns at this time.       Arrival mode: Personal vehicle     PCP: Srini Scott MD        Past Medical History:  Past Medical History:   Diagnosis Date    Aortic valve prosthesis present     CHF (congestive heart failure)     CKD (chronic kidney disease) stage 3, GFR 30-59 ml/min     Colon cancer     Hypertension     PAF (paroxysmal atrial fibrillation)        Past Surgical History:  Past Surgical History:   Procedure Laterality Date    AORTIC VALVE REPLACEMENT  2016    COLON RESECTION      TRANSESOPHAGEAL ECHOCARDIOGRAM WITH POSSIBLE CARDIOVERSION ([facility] W/ POSS CARDIOVERSION) N/A 8/9/2023    Procedure: Transesophageal echo ([facility]) intra-procedure log documentation;  Surgeon: Tere Johns MD;  Location: [facility] CATH LAB;  Service: Cardiology;   Laterality: N/A;         Family History:  Family History   Problem Relation Age of Onset    Hypertension Mother     Colon cancer Father        Social History:  Social History     Tobacco Use    Smoking status: Former    Smokeless tobacco: Never   Substance and Sexual Activity    Alcohol use: Yes    Drug use: Never    Sexual activity: Not on file        Review of Systems     Review of Systems   Constitutional:  Negative for chills and fever.   HENT:  Negative for sore throat.    Respiratory:  Negative for shortness of breath.    Cardiovascular:  Negative for chest pain.   Gastrointestinal:  Positive for abdominal pain, nausea and vomiting. Negative for blood in stool and diarrhea.   Genitourinary:  Negative for dysuria.   Musculoskeletal:  Negative for back pain.   Skin:  Negative for rash.   Neurological:  Negative for weakness.   Hematological:  Does not bruise/bleed easily.   All other systems reviewed and are negative.     Physical Exam     Initial Vitals [09/02/23 0453]   BP Pulse Resp Temp SpO2   (!) 144/102 74 18 97.6 °F (36.4 °C) 97 %      MAP       --          Physical Exam  Nursing Notes and Vital Signs Reviewed.  Constitutional: Patient is in no acute distress. Well-developed and well-nourished.  Head: Atraumatic. Normocephalic.  Eyes:  EOM intact.  No scleral icterus.  ENT: Mucous membranes are moist.  Nares clear   Neck:  Full ROM. No JVD.  Cardiovascular: Regular rate. Regular rhythm No murmurs, rubs, or gallops. Distal pulses are 2+ and symmetric  Pulmonary/Chest: No respiratory distress. Clear to auscultation bilaterally. No wheezing or rales.  Equal chest wall rise bilaterally  Abdominal: Soft and non-distended.  There is no tenderness.  No rebound, guarding, or rigidity. Good bowel sounds.  Multiple nontender easily reducible ventral hernias  Genitourinary: No CVA tenderness.  No suprapubic tenderness  Musculoskeletal: Moves all extremities. No obvious deformities.  5 x 5 strength in all extremities  "  Skin: Warm and dry.  Neurological:  Alert, awake, and appropriate.  Normal speech.  No acute focal neurological deficits are appreciated.  Two through 12 intact bilaterally.  Psychiatric: Normal affect. Good eye contact. Appropriate in content.     ED Course   Procedures  ED Vital Signs:  Vitals:    09/02/23 0453 09/02/23 0700 09/02/23 0903 09/02/23 0933   BP: (!) 144/102 (!) 160/103  (!) 148/96   Pulse: 74 77  78   Resp: 18 17 17 18   Temp: 97.6 °F (36.4 °C)   98.2 °F (36.8 °C)   TempSrc: Oral   Oral   SpO2: 97% 99%     Weight: 69.4 kg (153 lb)      Height: 5' 10" (1.778 m)       09/02/23 1000   BP: (!) 138/96   Pulse: 76   Resp: 18   Temp:    TempSrc:    SpO2: 97%   Weight:    Height:        Abnormal Lab Results:  Labs Reviewed   CBC W/ AUTO DIFFERENTIAL - Abnormal; Notable for the following components:       Result Value    WBC 13.01 (*)     MCH 32.2 (*)     Platelets 147 (*)     Gran # (ANC) 12.0 (*)     Lymph # 0.4 (*)     Gran % 91.8 (*)     Lymph % 2.7 (*)     All other components within normal limits   COMPREHENSIVE METABOLIC PANEL - Abnormal; Notable for the following components:    Sodium 135 (*)     Chloride 93 (*)     Glucose 131 (*)     BUN 24 (*)     Creatinine 1.5 (*)     Calcium 11.0 (*)     Total Protein 9.8 (*)     Total Bilirubin 1.4 (*)     Alkaline Phosphatase 136 (*)     eGFR 47 (*)     All other components within normal limits   URINALYSIS, REFLEX TO URINE CULTURE - Abnormal; Notable for the following components:    Protein, UA 1+ (*)     Ketones, UA Trace (*)     All other components within normal limits    Narrative:     Specimen Source->Urine   URINALYSIS MICROSCOPIC - Abnormal; Notable for the following components:    Hyaline Casts, UA 3 (*)     All other components within normal limits    Narrative:     Specimen Source->Urine   LIPASE        All Lab Results:  Results for orders placed or performed during the hospital encounter of 09/02/23   CBC auto differential   Result Value Ref Range "    WBC 13.01 (H) 3.90 - 12.70 K/uL    RBC 4.97 4.60 - 6.20 M/uL    Hemoglobin 16.0 14.0 - 18.0 g/dL    Hematocrit 46.4 40.0 - 54.0 %    MCV 93 82 - 98 fL    MCH 32.2 (H) 27.0 - 31.0 pg    MCHC 34.5 32.0 - 36.0 g/dL    RDW 13.2 11.5 - 14.5 %    Platelets 147 (L) 150 - 450 K/uL    MPV 10.6 9.2 - 12.9 fL    Immature Granulocytes 0.2 0.0 - 0.5 %    Gran # (ANC) 12.0 (H) 1.8 - 7.7 K/uL    Immature Grans (Abs) 0.03 0.00 - 0.04 K/uL    Lymph # 0.4 (L) 1.0 - 4.8 K/uL    Mono # 0.7 0.3 - 1.0 K/uL    Eos # 0.0 0.0 - 0.5 K/uL    Baso # 0.02 0.00 - 0.20 K/uL    nRBC 0 0 /100 WBC    Gran % 91.8 (H) 38.0 - 73.0 %    Lymph % 2.7 (L) 18.0 - 48.0 %    Mono % 5.1 4.0 - 15.0 %    Eosinophil % 0.0 0.0 - 8.0 %    Basophil % 0.2 0.0 - 1.9 %    Differential Method Automated    Comprehensive metabolic panel   Result Value Ref Range    Sodium 135 (L) 136 - 145 mmol/L    Potassium 4.9 3.5 - 5.1 mmol/L    Chloride 93 (L) 95 - 110 mmol/L    CO2 26 23 - 29 mmol/L    Glucose 131 (H) 70 - 110 mg/dL    BUN 24 (H) 8 - 23 mg/dL    Creatinine 1.5 (H) 0.5 - 1.4 mg/dL    Calcium 11.0 (H) 8.7 - 10.5 mg/dL    Total Protein 9.8 (H) 6.0 - 8.4 g/dL    Albumin 4.5 3.5 - 5.2 g/dL    Total Bilirubin 1.4 (H) 0.1 - 1.0 mg/dL    Alkaline Phosphatase 136 (H) 55 - 135 U/L    AST 34 10 - 40 U/L    ALT 20 10 - 44 U/L    eGFR 47 (A) >60 mL/min/1.73 m^2    Anion Gap 16 8 - 16 mmol/L   Lipase   Result Value Ref Range    Lipase 19 4 - 60 U/L   Urinalysis, Reflex to Urine Culture Urine, Clean Catch    Specimen: Urine   Result Value Ref Range    Specimen UA Urine, Clean Catch     Color, UA Yellow Yellow, Straw, Priscilla    Appearance, UA Clear Clear    pH, UA 7.0 5.0 - 8.0    Specific Gravity, UA 1.015 1.005 - 1.030    Protein, UA 1+ (A) Negative    Glucose, UA Negative Negative    Ketones, UA Trace (A) Negative    Bilirubin (UA) Negative Negative    Occult Blood UA Negative Negative    Nitrite, UA Negative Negative    Urobilinogen, UA Negative <2.0 EU/dL    Leukocytes, UA  Negative Negative   Urinalysis Microscopic   Result Value Ref Range    RBC, UA 0 0 - 4 /hpf    WBC, UA 0 0 - 5 /hpf    Bacteria None None-Occ /hpf    Hyaline Casts, UA 3 (A) 0-1/lpf /lpf    Microscopic Comment SEE COMMENT          Imaging Results:  Imaging Results              CT Abdomen Pelvis With Contrast (Final result)  Result time 09/02/23 10:48:46      Final result by Domo Frias III, MD (09/02/23 10:48:46)                   Impression:      Stable distal small-bowel obstruction.  No oral contrast extends distal to the stomach and proximal duodenum again limiting evaluation of the transition point.  Abdominal x-ray follow up to evaluate transit of oral contrast may be of benefit.  Possible transition points at an anastomotic stricture in the right lower quadrant versus the periumbilical hernia or right inguinal hernia.  Left inguinal hernia which does not appear to represent the site of obstruction. No other acute findings.    All CT scans at this facility are performed  using dose modulation techniques as appropriate to performed exam including the following:  automated exposure control; adjustment of mA and/or kV according to the patients size (this includes techniques or standardized protocols for targeted exams where dose is matched to indication/reason for exam: i.e. extremities or head);  iterative reconstruction technique.      Electronically signed by: Domo Frias MD  Date:    09/02/2023  Time:    10:48               Narrative:    EXAMINATION:  CT ABDOMEN PELVIS WITH CONTRAST    CLINICAL HISTORY:  Bowel obstruction suspected; abdominal pain    COMPARISON:  Noncontrast abdominal CT from 2 hours ago.    FINDINGS:  Lung bases: No acute findings.    Bones: No acute osseous abnormality or suspicious bone lesions.    Stomach and bowel: No oral contrast extends distal to the stomach and proximal duodenum again limiting evaluation of the transition point.  Stable left periumbilical hernia containing a  short segment of dilated small bowel. Stable left inguinal hernia containing a short segment of nondilated sigmoid colon.  Stable right inguinal hernia containing a nondilated segment of the cecum, sigmoid colon and possibly the terminal ileum which appears compressed.  Stable diffuse small bowel dilation with air-fluid levels. The transition point is again not well evaluated. Partial small bowel resection and anastomosis in the right lower quadrant.  There are a few segments of collapsed segments of small bowel in the right mid and lower quadrant as well as just posterior to the periumbilical hernia and right inguinal hernia..  No colonic dilation is present.  No acute inflammatory changes identified..    Appendix: No evidence of appendicitis.    Liver: Unremarkable.    Gallbladder and bile ducts: Unremarkable.    Pancreas: Unremarkable.    Spleen: Unremarkable.    Adrenals: Unremarkable.    Bladder: Unremarkable.    Aorta: No aneurysm.    Reproductive organs: Within normal limits.    Miscellaneous: No free intraperitoneal fluid, free air or abscess identified. No pathologic lymphadenopathy.                                       CT Abdomen Pelvis  Without Contrast (Final result)  Result time 09/02/23 08:48:04      Final result by Domo Frias III, MD (09/02/23 08:48:04)                   Impression:      Persistent distal small-bowel obstruction with a possible transition point at an anastomotic stricture in the right lower quadrant however evaluation is limited without oral contrast.  Persistent bowel containing inguinal and left periumbilical hernias which do not appear to represent the site of obstruction.  No other acute findings.    Note: All CT scans at this facility are performed  using dose modulation techniques as appropriate to performed exam including the following:  automated exposure control; adjustment of mA and/or kV according to the patients size (this includes techniques or standardized  protocols for targeted exams where dose is matched to indication/reason for exam: i.e. extremities or head);  iterative reconstruction technique.      Electronically signed by: Domo Frias MD  Date:    09/02/2023  Time:    08:48               Narrative:    EXAMINATION:  CT ABDOMEN PELVIS WITHOUT CONTRAST    CLINICAL HISTORY:  Abdominal abscess/infection suspected;    TECHNIQUE:  Routine CT abdomen and pelvis performed without IV contrast.  Coronal and sagittal reformatted images obtained.    COMPARISON:  March 9, 2023    FINDINGS:  Lung bases: No acute findings.    Bones: No acute osseous abnormality or suspicious bone lesions.    Kidneys and ureters: No evidence of urolithiasis, hydronephrosis or other acute findings.    Stomach and bowel: Stable left periumbilical hernia containing a short segment of dilated small bowel.  Stable left inguinal hernia containing a short segment of nondilated sigmoid colon.  Enlarging right inguinal hernia containing a nondilated segment of the cecum and sigmoid colon.  Stable diffuse small bowel dilation with air-fluid levels.  The transition point is not well evaluated without oral contrast.  Partial small bowel resection and anastomosis in the right lower quadrant with collapse of the more distal small bowel, possibly representing anastomotic stricture and site of obstruction.  No colonic dilation is present.  No acute inflammatory changes identified..    Appendix: No evidence of appendicitis.    Liver: Unremarkable for noncontrast technique.    Gallbladder and bile ducts: Unremarkable.    Pancreas: Unremarkable for noncontrast technique.    Spleen: Unremarkable for noncontrast technique.    Adrenals: Unremarkable.    Bladder: Unremarkable.    Aorta: No aneurysm.    Reproductive organs: Within normal limits.    Miscellaneous: No free intraperitoneal fluid, free air or abscess identified. No pathologic lymphadenopathy.                                              The Emergency  Provider reviewed the vital signs and test results, which are outlined above.     ED Discussion     11:09 AM: Discussed pt's case with Dr. Andrew Hill MD (General Surgery) who recommends admission to the medical service due to his medical problems and holding out anticoagulation.    11:15 AM: Discussed case with Andrew Hill MD (General Surgery). Dr. Hillary Dunbar MD agrees with current care and management of pt and accepts admission.   Admitting Service: Hospital Medicine  Admitting Physician: Dr. Dunbar  Admit to: Inpatient med/surg    11:17 AM: Re-evaluated pt. I have discussed test results, shared treatment plan, and the need for admission with patient and family at bedside. Pt and family express understanding at this time and agree with all information. All questions answered. Pt and family have no further questions or concerns at this time. Pt is ready for admit.       Medical Decision Making  Differential diagnosis: Abdominal pain, generalized abdominal pain, small-bowel obstruction, intra-abdominal infection, intra-abdominal abscess, incarcerated hernia, strangulated hernia urinary tract infection    Patient was evaluated with CT scans well as blood work.  These indicate what appears be small-bowel obstruction.  This is recurrent.  Discussed the case with general surgery recommended holding blood thinners, would see later this afternoon happen admitted to Hospital Medicine.  Hospital Medicine.  She is accepted.  I have had a long discussion with the patient regarding his diagnosis as well as plan of care.    Amount and/or Complexity of Data Reviewed  External Data Reviewed: notes.     Details: Reviewed the patient's prior history regarding his hernias.  Longstanding.  Patient states his surgery was done here.  Labs: ordered.     Details: Lipase is normal, patient has some mild renal insufficiency be on 20/4 creatinine 1.5.  Calcium is elevated at 11.  Is 13,000 white count mild left shift.  UA is  clean.  Radiology: ordered.     Details: Initial CT scan was noncontrast shunt questionable bowel obstruction.  Contrasted CT shows stable persistent small-bowel obstruction with possible transition point at anastomotic restrictions right lower quadrant  Discussion of management or test interpretation with external provider(s): Discussed the case with Dr. Andrew Ramirez with General surgery.  Recommended holding blood thinners and admitting to Hospital Medicine NPO.  Hospital Medicine graciously accepts.    Risk  Prescription drug management.  Parenteral controlled substances.  Decision regarding hospitalization.  Risk Details: Patient being admitted for recurrent small-bowel obstruction.  You may or may not require surgery based upon in his response to conservative therapy.  Patient was received morphine for pain we have evaluated all of his medications.                ED Medication(s):  Medications   sodium chloride 0.9% bolus 1,000 mL 1,000 mL (1,000 mLs Intravenous New Bag 9/2/23 5650)   carvediloL tablet 25 mg (has no administration in time range)   torsemide tablet 30 mg (has no administration in time range)   sodium chloride 0.9% flush 10 mL (has no administration in time range)   naloxone 0.4 mg/mL injection 0.02 mg (has no administration in time range)   glucose chewable tablet 16 g (has no administration in time range)   glucose chewable tablet 24 g (has no administration in time range)   glucagon (human recombinant) injection 1 mg (has no administration in time range)   acetaminophen tablet 650 mg (has no administration in time range)   HYDROcodone-acetaminophen 5-325 mg per tablet 1 tablet (has no administration in time range)   ondansetron injection 4 mg (has no administration in time range)   dextrose 10% bolus 125 mL 125 mL (has no administration in time range)   dextrose 10% bolus 250 mL 250 mL (has no administration in time range)   morphine injection 4 mg (4 mg Intravenous Given 9/2/23 7956)    ondansetron injection 4 mg (4 mg Intravenous Given 9/2/23 0903)   iohexoL (OMNIPAQUE 350) injection 100 mL (100 mLs Intravenous Given 9/2/23 1009)   iohexoL (OMNIPAQUE 9) oral solution 1,000 mL (1,000 mLs Oral Given 9/2/23 1008)       New Prescriptions    No medications on file               Scribe Attestation:   Scribe #1: I performed the above scribed service and the documentation accurately describes the services I performed. I attest to the accuracy of the note.     Attending:   Physician Attestation Statement for Scribe #1: I, Santana Raman Jr., MD, personally performed the services described in this documentation, as scribed by Bertha Peralta, in my presence, and it is both accurate and complete.           Clinical Impression       ICD-10-CM ICD-9-CM   1. SBO (small bowel obstruction)  K56.609 560.9   2. Chest pain  R07.9 786.50       Disposition:   Disposition: Admitted  Condition: Fair         Santana Raman Jr., MD  09/02/23 1148

## 2023-09-02 NOTE — ASSESSMENT & PLAN NOTE
Hold anticoagulation for the next 24 hours.  After the short-acting anticoagulation if necessary.      Patient needs to be off his novel anticoagulant, Xarelto, for 48 hours before any surgical intervention

## 2023-09-02 NOTE — HPI
"Mr Chu is a 82 year old male with h/o hypertension, CHF (EF 15-20%), AVR 2016 ([facility]), ICD placement in 2022, h/o colon cancer s/p colectomy in 2009 at [facility] and CKD who presents to the Emergency Department for evaluation of abd pain which onset last night. Pt reports abd pain is centralized at abdominal hernia. He reports three "normal" bowel movements yesterday.  He denies any previous SBO.  He did have one episode of vomiting while in ED.  Work in ED notable for WBC 13, Creatinine 1.5, bili 1.4.  CT ab/pelvis shows Stable distal small-bowel obstruction, Persistent bowel containing inguinal and left periumbilical hernias.  General surgery was consulted, recommended admission under hospital medicine d/t patients multiple co-morbidities and surgery will see in consultation.  Patient will be admitted to hospital medicine for SBO.       Code Status   Surrogate Decision maker   "

## 2023-09-02 NOTE — SUBJECTIVE & OBJECTIVE
Past Medical History:   Diagnosis Date    Aortic valve prosthesis present     CHF (congestive heart failure)     CKD (chronic kidney disease) stage 3, GFR 30-59 ml/min     Colon cancer     Hypertension     PAF (paroxysmal atrial fibrillation)        Past Surgical History:   Procedure Laterality Date    AORTIC VALVE REPLACEMENT  2016    COLON RESECTION      TRANSESOPHAGEAL ECHOCARDIOGRAM WITH POSSIBLE CARDIOVERSION ([facility] W/ POSS CARDIOVERSION) N/A 8/9/2023    Procedure: Transesophageal echo ([facility]) intra-procedure log documentation;  Surgeon: Tere Johns MD;  Location: [facility] CATH LAB;  Service: Cardiology;  Laterality: N/A;       Review of patient's allergies indicates:   Allergen Reactions    Eliquis [apixaban] Swelling    Sacubitril-valsartan Hives and Swelling    Amlodipine Edema     Ankle    Lisinopril Other (See Comments)    Mefloquine Hives and Itching     Anti malarial      Spironolactone Edema       No current facility-administered medications on file prior to encounter.     Current Outpatient Medications on File Prior to Encounter   Medication Sig    carvediloL (COREG) 12.5 MG tablet Take 25 mg by mouth 2 (two) times daily with meals.    coenzyme Q10 100 mg capsule Take 100 mg by mouth once daily.    LORazepam (ATIVAN) 0.5 MG tablet Take 1 mg by mouth 2 (two) times daily.    olmesartan (BENICAR) 40 MG tablet Take 20 mg by mouth once daily.    rivaroxaban (XARELTO) 10 mg Tab Take 2 tablets (20 mg total) by mouth daily with dinner or evening meal.    torsemide (DEMADEX) 20 MG Tab Take 30 mg by mouth once daily.    tumeric-ging-olive-oreg-capryl 100 mg-150 mg- 50 mg-150 mg Cap Take 1 capsule by mouth once daily.    dapagliflozin (FARXIGA) 10 mg tablet Take 10 mg by mouth once daily.    mometasone 0.1% (ELOCON) 0.1 % cream As needed cream     Family History       Problem Relation (Age of Onset)    Colon cancer Father    Hypertension Mother          Tobacco Use    Smoking status: Former    Smokeless tobacco:  Never   Substance and Sexual Activity    Alcohol use: Yes    Drug use: Never    Sexual activity: Not on file     Review of Systems   Gastrointestinal:  Positive for abdominal distention, nausea and vomiting.     Objective:     Vital Signs (Most Recent):  Temp: 98.2 °F (36.8 °C) (09/02/23 0933)  Pulse: 76 (09/02/23 1000)  Resp: 18 (09/02/23 1000)  BP: (!) 138/96 (09/02/23 1000)  SpO2: 97 % (09/02/23 1000) Vital Signs (24h Range):  Temp:  [97.6 °F (36.4 °C)-98.2 °F (36.8 °C)] 98.2 °F (36.8 °C)  Pulse:  [74-78] 76  Resp:  [17-18] 18  SpO2:  [97 %-99 %] 97 %  BP: (138-160)/() 138/96     Weight: 69.4 kg (153 lb)  Body mass index is 21.95 kg/m².     Physical Exam  Vitals and nursing note reviewed.   Constitutional:       Appearance: Normal appearance.   Cardiovascular:      Rate and Rhythm: Normal rate and regular rhythm.      Pulses: Normal pulses.      Heart sounds: Normal heart sounds.   Pulmonary:      Effort: Pulmonary effort is normal.      Breath sounds: Normal breath sounds. No wheezing.   Abdominal:      Comments: Mid abdominal hernia present, soft, ab non-tender, mild distension, hypoactive bowel sounds   Musculoskeletal:         General: No swelling. Normal range of motion.   Skin:     General: Skin is warm and dry.   Neurological:      Mental Status: He is alert and oriented to person, place, and time.                Significant Labs: All pertinent labs within the past 24 hours have been reviewed.    Significant Imaging: I have reviewed all pertinent imaging results/findings within the past 24 hours.

## 2023-09-02 NOTE — ASSESSMENT & PLAN NOTE
Patient with Paroxysmal (<7 days) atrial fibrillation which is controlled currently with Beta Blocker. Patient is currently in sinus rhythm.[id number] Score: 3. . Anticoagulation not indicated due to xarelto.    - holding home xarelto at this time d/t possible surgical intervention

## 2023-09-02 NOTE — SUBJECTIVE & OBJECTIVE
No current facility-administered medications on file prior to encounter.     Current Outpatient Medications on File Prior to Encounter   Medication Sig    carvediloL (COREG) 12.5 MG tablet Take 25 mg by mouth 2 (two) times daily with meals.    coenzyme Q10 100 mg capsule Take 100 mg by mouth once daily.    LORazepam (ATIVAN) 0.5 MG tablet Take 1 mg by mouth 2 (two) times daily.    olmesartan (BENICAR) 40 MG tablet Take 20 mg by mouth once daily.    rivaroxaban (XARELTO) 10 mg Tab Take 2 tablets (20 mg total) by mouth daily with dinner or evening meal.    torsemide (DEMADEX) 20 MG Tab Take 30 mg by mouth once daily.    tumeric-ging-olive-oreg-capryl 100 mg-150 mg- 50 mg-150 mg Cap Take 1 capsule by mouth once daily.    dapagliflozin (FARXIGA) 10 mg tablet Take 10 mg by mouth once daily.    mometasone 0.1% (ELOCON) 0.1 % cream As needed cream       Review of patient's allergies indicates:   Allergen Reactions    Eliquis [apixaban] Swelling    Sacubitril-valsartan Hives and Swelling    Amlodipine Edema     Ankle    Lisinopril Other (See Comments)    Mefloquine Hives and Itching     Anti malarial      Spironolactone Edema       Past Medical History:   Diagnosis Date    Aortic valve prosthesis present     CHF (congestive heart failure)     CKD (chronic kidney disease) stage 3, GFR 30-59 ml/min     Colon cancer     Hypertension     PAF (paroxysmal atrial fibrillation)      Past Surgical History:   Procedure Laterality Date    AORTIC VALVE REPLACEMENT  2016    COLON RESECTION      TRANSESOPHAGEAL ECHOCARDIOGRAM WITH POSSIBLE CARDIOVERSION ([facility] W/ POSS CARDIOVERSION) N/A 8/9/2023    Procedure: Transesophageal echo ([facility]) intra-procedure log documentation;  Surgeon: Tere Johns MD;  Location: [facility] CATH LAB;  Service: Cardiology;  Laterality: N/A;     Family History       Problem Relation (Age of Onset)    Colon cancer Father    Hypertension Mother          Tobacco Use    Smoking status: Former    Smokeless tobacco:  Never   Substance and Sexual Activity    Alcohol use: Yes    Drug use: Never    Sexual activity: Not on file     Review of Systems   Constitutional: Negative.    HENT: Negative.     Eyes: Negative.    Respiratory: Negative.     Cardiovascular: Negative.    Gastrointestinal:  Positive for abdominal distention, nausea and vomiting.   Endocrine: Negative.    Genitourinary: Negative.    Musculoskeletal: Negative.    Skin: Negative.    Allergic/Immunologic: Negative.    Neurological: Negative.    Hematological: Negative.    Psychiatric/Behavioral: Negative.       Objective:     Vital Signs (Most Recent):  Temp: 98.2 °F (36.8 °C) (09/02/23 0933)  Pulse: 76 (09/02/23 1315)  Resp: 18 (09/02/23 1315)  BP: (!) 154/85 (09/02/23 1315)  SpO2: 98 % (09/02/23 1315) Vital Signs (24h Range):  Temp:  [97.6 °F (36.4 °C)-98.2 °F (36.8 °C)] 98.2 °F (36.8 °C)  Pulse:  [74-88] 76  Resp:  [17-18] 18  SpO2:  [97 %-99 %] 98 %  BP: (138-166)/() 154/85     Weight: 69.4 kg (153 lb)  Body mass index is 21.95 kg/m².     Physical Exam  Constitutional:       General: He is not in acute distress.     Appearance: He is well-developed. He is ill-appearing.      Comments: Very thin almost cachectic   HENT:      Head: Normocephalic and atraumatic.   Eyes:      General: No scleral icterus.     Pupils: Pupils are equal, round, and reactive to light.   Neck:      Thyroid: No thyromegaly.      Vascular: No JVD.      Trachea: No tracheal deviation.   Cardiovascular:      Rate and Rhythm: Normal rate and regular rhythm.      Heart sounds: Normal heart sounds.   Pulmonary:      Effort: Pulmonary effort is normal.      Breath sounds: Normal breath sounds.   Abdominal:      General: Abdomen is flat. Bowel sounds are normal. There is no distension.      Palpations: Abdomen is soft. There is no mass.      Tenderness: There is no abdominal tenderness. There is no guarding or rebound.      Hernia: A hernia (reducible incisional, reducible right inguinal,  reducible left inguinal) is present.   Musculoskeletal:         General: Normal range of motion.      Cervical back: Normal range of motion and neck supple.   Lymphadenopathy:      Cervical: No cervical adenopathy.   Skin:     General: Skin is warm and dry.   Neurological:      Mental Status: He is alert and oriented to person, place, and time.   Psychiatric:         Mood and Affect: Mood normal.         Behavior: Behavior normal.         Thought Content: Thought content normal.         Judgment: Judgment normal.            I have reviewed all pertinent lab results within the past 24 hours.  CBC:   Recent Labs   Lab 09/02/23 0902   WBC 13.01*   RBC 4.97   HGB 16.0   HCT 46.4   *   MCV 93   MCH 32.2*   MCHC 34.5     BMP:   Recent Labs   Lab 09/02/23 0902   *   *   K 4.9   CL 93*   CO2 26   BUN 24*   CREATININE 1.5*   CALCIUM 11.0*     CMP:   Recent Labs   Lab 09/02/23 0902   *   CALCIUM 11.0*   ALBUMIN 4.5   PROT 9.8*   *   K 4.9   CO2 26   CL 93*   BUN 24*   CREATININE 1.5*   ALKPHOS 136*   ALT 20   AST 34   BILITOT 1.4*       Significant Diagnostics:  I have reviewed all pertinent imaging results/findings within the past 24 hours.  CT: I have reviewed all pertinent results/findings within the past 24 hours and my personal findings are:  See below    Initial CT scan    [phone] 9/2/2023 STAT     Narrative & Impression  EXAMINATION:  CT ABDOMEN PELVIS WITHOUT CONTRAST     CLINICAL HISTORY:  Abdominal abscess/infection suspected;     TECHNIQUE:  Routine CT abdomen and pelvis performed without IV contrast.  Coronal and sagittal reformatted images obtained.     COMPARISON:  March 9, 2023     FINDINGS:  Lung bases: No acute findings.     Bones: No acute osseous abnormality or suspicious bone lesions.     Kidneys and ureters: No evidence of urolithiasis, hydronephrosis or other acute findings.     Stomach and bowel: Stable left periumbilical hernia containing a short segment of  dilated small bowel.  Stable left inguinal hernia containing a short segment of nondilated sigmoid colon.  Enlarging right inguinal hernia containing a nondilated segment of the cecum and sigmoid colon.  Stable diffuse small bowel dilation with air-fluid levels.  The transition point is not well evaluated without oral contrast.  Partial small bowel resection and anastomosis in the right lower quadrant with collapse of the more distal small bowel, possibly representing anastomotic stricture and site of obstruction.  No colonic dilation is present.  No acute inflammatory changes identified..     Appendix: No evidence of appendicitis.     Liver: Unremarkable for noncontrast technique.     Gallbladder and bile ducts: Unremarkable.     Pancreas: Unremarkable for noncontrast technique.     Spleen: Unremarkable for noncontrast technique.     Adrenals: Unremarkable.     Bladder: Unremarkable.     Aorta: No aneurysm.     Reproductive organs: Within normal limits.     Miscellaneous: No free intraperitoneal fluid, free air or abscess identified. No pathologic lymphadenopathy.     Impression:     Persistent distal small-bowel obstruction with a possible transition point at an anastomotic stricture in the right lower quadrant however evaluation is limited without oral contrast.  Persistent bowel containing inguinal and left periumbilical hernias which do not appear to represent the site of obstruction.  No other acute findings.     Note: All CT scans at this facility are performed  using dose modulation techniques as appropriate to performed exam including the following:  automated exposure control; adjustment of mA and/or kV according to the patients size (this includes techniques or standardized protocols for targeted exams where dose is matched to indication/reason for exam: i.e. extremities or head);  iterative reconstruction technique.        Electronically signed by: Domo Frias MD  Date:                                             09/02/2023  Time:                                           08:48    EXAMINATION:  CT ABDOMEN PELVIS WITH CONTRAST     CLINICAL HISTORY:  Bowel obstruction suspected; abdominal pain     COMPARISON:  Noncontrast abdominal CT from 2 hours ago.     FINDINGS:  Lung bases: No acute findings.     Bones: No acute osseous abnormality or suspicious bone lesions.     Stomach and bowel: No oral contrast extends distal to the stomach and proximal duodenum again limiting evaluation of the transition point.  Stable left periumbilical hernia containing a short segment of dilated small bowel. Stable left inguinal hernia containing a short segment of nondilated sigmoid colon.  Stable right inguinal hernia containing a nondilated segment of the cecum, sigmoid colon and possibly the terminal ileum which appears compressed.  Stable diffuse small bowel dilation with air-fluid levels. The transition point is again not well evaluated. Partial small bowel resection and anastomosis in the right lower quadrant.  There are a few segments of collapsed segments of small bowel in the right mid and lower quadrant as well as just posterior to the periumbilical hernia and right inguinal hernia..  No colonic dilation is present.  No acute inflammatory changes identified..     Appendix: No evidence of appendicitis.     Liver: Unremarkable.     Gallbladder and bile ducts: Unremarkable.     Pancreas: Unremarkable.     Spleen: Unremarkable.     Adrenals: Unremarkable.     Bladder: Unremarkable.     Aorta: No aneurysm.     Reproductive organs: Within normal limits.     Miscellaneous: No free intraperitoneal fluid, free air or abscess identified. No pathologic lymphadenopathy.     Impression:     Stable distal small-bowel obstruction.  No oral contrast extends distal to the stomach and proximal duodenum again limiting evaluation of the transition point.  Abdominal x-ray follow up to evaluate transit of oral contrast may be of benefit.  Possible  "transition points at an anastomotic stricture in the right lower quadrant versus the periumbilical hernia or right inguinal hernia.  Left inguinal hernia which does not appear to represent the site of obstruction. No other acute findings.     All CT scans at this facility are performed  using dose modulation techniques as appropriate to performed exam including the following:  automated exposure control; adjustment of mA and/or kV according to the patients size (this includes techniques or standardized protocols for targeted exams where dose is matched to indication/reason for exam: i.e. extremities or head);  iterative reconstruction technique.        Electronically signed by: Domo Frias MD  Date:                                            09/02/2023  Time:                                           10:48           Exam Ended: 09/02/23 10:20             Transesophageal echo ([facility]) with doppler, color, cardioversion and contrast    Height:  5' 10" (1.778 m)   Weight:  71.4 kg (157 lb 6.5 oz)   Blood Pressure:  164/103    Date of Study:  8/9/23   Ordering Provider:  Tere Johns MD   Clinical Indications:  PAF (paroxysmal atrial fibrillation) [I48.0 (ICD-10-CM)]       Interpreting Physicians  Performing Staff   Tere Johns MD Tech:  Teri Zurita,         Reason for Exam  Priority: Routine  Dx: PAF (paroxysmal atrial fibrillation) [I48.0 (ICD-10-CM)]     View Images Vital Vitrea     Show images for Transesophageal echo ([facility]) with possible cardioversion  Summary         Left Ventricle: Severe global hypokinesis present. There is severely reduced systolic function with a visually estimated ejection fraction of 15 - 20%.    Left Atrium: Left atrium is mildly dilated. The left atrial appendage appears normal. Appendage velocity is reduced at less than 40 cm/sec. There is no thrombus in the left atrial appendage.    Right Ventricle: Mild right ventricular enlargement. Systolic function is moderately " reduced.    Right Atrium: Normal right atrial size. Lead present in the right atrium.    Aortic Valve: There is a bioprosthetic valve in the aortic position that is well-seated. There is normal leaflet mobility.    Mitral Valve: The mitral valve is structurally normal. There is normal leaflet mobility. There is no stenosis. There is moderate regurgitation.    Tricuspid Valve: The tricuspid valve is structurally normal. There is normal leaflet mobility. There is no significant regurgitation.    Pulmonic Valve: Not assessed due to poor acoustic window.

## 2023-09-02 NOTE — ASSESSMENT & PLAN NOTE
- conservative mgt at this time  - NPO  - general surgery consulted, will follow up recs  - antiemetics as needed  - holding off on IVF d/t ef 15%

## 2023-09-02 NOTE — HPI
82-year-old male who is past medical history is most significant for ischemic cardiomyopathy with an ejection fraction in the 15% range, who presented to the emergency room with a 1 day history of abdominal pain and a single episode of vomiting.      The patient was evaluated in the emergency room where a CT scan showed dilated loops of small bowel and several hernias.  The oral contrast from the CT scan essentially remained in the stomach.  There was concern for small-bowel obstruction.      The patient had a similar episode of this last August that resolve without operative intervention.  The patient states he is a vegetarian and usually has 3 bowel movements a day.  He is not noticed any changes in his bowel movements.      The patient has a significant cardiac history with a very low ejection fracture and valvular abnormalities that make him a almost prohibitive operative risk    The patient is anticoagulated on Xarelto

## 2023-09-02 NOTE — CONSULTS
[facility] - Emergency Dept.  General Surgery  Consult Note    Patient Name: Edmund Chu  MRN: [id number]  Code Status: Full Code  Admission Date: 9/2/2023  Hospital Length of Stay: 0 days  Attending Physician: Hillary Dunbar MD  Primary Care Provider: Srini Scott MD    Patient information was obtained from patient, spouse/SO, past medical records and ER records.     Inpatient consult to General Surgery  Consult performed by: Andrew Hill MD  Consult ordered by: Ruba Vazquez NP  Reason for consult: Small-bowel obstruction  Assessment/Recommendations: NPO   IV fluids  NG tube for nausea and vomiting   Repeat x-rays   Patient should be managed in a conservative manner as he has an exceedingly and almost prohibitively high risk of cardiac events with surgical intervention.      Surgery intervention only if there are no other non operative options and the patient is willing to accept an understands that he may die or have a prolonged ICU course as the result of the initiation of general anesthetic    Nonobstructing incisional hernia   Nonobstructing bilateral inguinal hernias        Subjective:     Principal Problem: SBO (small bowel obstruction)    History of Present Illness: 82-year-old male who is past medical history is most significant for ischemic cardiomyopathy with an ejection fraction in the 15% range, who presented to the emergency room with a 1 day history of abdominal pain and a single episode of vomiting.      The patient was evaluated in the emergency room where a CT scan showed dilated loops of small bowel and several hernias.  The oral contrast from the CT scan essentially remained in the stomach.  There was concern for small-bowel obstruction.      The patient had a similar episode of this last August that resolve without operative intervention.  The patient states he is a vegetarian and usually has 3 bowel movements a day.  He is not noticed any changes in his bowel movements.       The patient has a significant cardiac history with a very low ejection fracture and valvular abnormalities that make him a almost prohibitive operative risk    The patient is anticoagulated on Xarelto      No current facility-administered medications on file prior to encounter.     Current Outpatient Medications on File Prior to Encounter   Medication Sig    carvediloL (COREG) 12.5 MG tablet Take 25 mg by mouth 2 (two) times daily with meals.    coenzyme Q10 100 mg capsule Take 100 mg by mouth once daily.    LORazepam (ATIVAN) 0.5 MG tablet Take 1 mg by mouth 2 (two) times daily.    olmesartan (BENICAR) 40 MG tablet Take 20 mg by mouth once daily.    rivaroxaban (XARELTO) 10 mg Tab Take 2 tablets (20 mg total) by mouth daily with dinner or evening meal.    torsemide (DEMADEX) 20 MG Tab Take 30 mg by mouth once daily.    tumeric-ging-olive-oreg-capryl 100 mg-150 mg- 50 mg-150 mg Cap Take 1 capsule by mouth once daily.    dapagliflozin (FARXIGA) 10 mg tablet Take 10 mg by mouth once daily.    mometasone 0.1% (ELOCON) 0.1 % cream As needed cream       Review of patient's allergies indicates:   Allergen Reactions    Eliquis [apixaban] Swelling    Sacubitril-valsartan Hives and Swelling    Amlodipine Edema     Ankle    Lisinopril Other (See Comments)    Mefloquine Hives and Itching     Anti malarial      Spironolactone Edema       Past Medical History:   Diagnosis Date    Aortic valve prosthesis present     CHF (congestive heart failure)     CKD (chronic kidney disease) stage 3, GFR 30-59 ml/min     Colon cancer     Hypertension     PAF (paroxysmal atrial fibrillation)      Past Surgical History:   Procedure Laterality Date    AORTIC VALVE REPLACEMENT  2016    COLON RESECTION      TRANSESOPHAGEAL ECHOCARDIOGRAM WITH POSSIBLE CARDIOVERSION ([facility] W/ POSS CARDIOVERSION) N/A 8/9/2023    Procedure: Transesophageal echo ([facility]) intra-procedure log documentation;  Surgeon: Tere Johns MD;  Location: [facility] CATH LAB;   Service: Cardiology;  Laterality: N/A;     Family History       Problem Relation (Age of Onset)    Colon cancer Father    Hypertension Mother          Tobacco Use    Smoking status: Former    Smokeless tobacco: Never   Substance and Sexual Activity    Alcohol use: Yes    Drug use: Never    Sexual activity: Not on file     Review of Systems   Constitutional: Negative.    HENT: Negative.     Eyes: Negative.    Respiratory: Negative.     Cardiovascular: Negative.    Gastrointestinal:  Positive for abdominal distention, nausea and vomiting.   Endocrine: Negative.    Genitourinary: Negative.    Musculoskeletal: Negative.    Skin: Negative.    Allergic/Immunologic: Negative.    Neurological: Negative.    Hematological: Negative.    Psychiatric/Behavioral: Negative.       Objective:     Vital Signs (Most Recent):  Temp: 98.2 °F (36.8 °C) (09/02/23 0933)  Pulse: 76 (09/02/23 1315)  Resp: 18 (09/02/23 1315)  BP: (!) 154/85 (09/02/23 1315)  SpO2: 98 % (09/02/23 1315) Vital Signs (24h Range):  Temp:  [97.6 °F (36.4 °C)-98.2 °F (36.8 °C)] 98.2 °F (36.8 °C)  Pulse:  [74-88] 76  Resp:  [17-18] 18  SpO2:  [97 %-99 %] 98 %  BP: (138-166)/() 154/85     Weight: 69.4 kg (153 lb)  Body mass index is 21.95 kg/m².     Physical Exam  Constitutional:       General: He is not in acute distress.     Appearance: He is well-developed. He is ill-appearing.      Comments: Very thin almost cachectic   HENT:      Head: Normocephalic and atraumatic.   Eyes:      General: No scleral icterus.     Pupils: Pupils are equal, round, and reactive to light.   Neck:      Thyroid: No thyromegaly.      Vascular: No JVD.      Trachea: No tracheal deviation.   Cardiovascular:      Rate and Rhythm: Normal rate and regular rhythm.      Heart sounds: Normal heart sounds.   Pulmonary:      Effort: Pulmonary effort is normal.      Breath sounds: Normal breath sounds.   Abdominal:      General: Abdomen is flat. Bowel sounds are normal. There is no distension.       Palpations: Abdomen is soft. There is no mass.      Tenderness: There is no abdominal tenderness. There is no guarding or rebound.      Hernia: A hernia (reducible incisional, reducible right inguinal, reducible left inguinal) is present.   Musculoskeletal:         General: Normal range of motion.      Cervical back: Normal range of motion and neck supple.   Lymphadenopathy:      Cervical: No cervical adenopathy.   Skin:     General: Skin is warm and dry.   Neurological:      Mental Status: He is alert and oriented to person, place, and time.   Psychiatric:         Mood and Affect: Mood normal.         Behavior: Behavior normal.         Thought Content: Thought content normal.         Judgment: Judgment normal.            I have reviewed all pertinent lab results within the past 24 hours.  CBC:   Recent Labs   Lab 09/02/23 0902   WBC 13.01*   RBC 4.97   HGB 16.0   HCT 46.4   *   MCV 93   MCH 32.2*   MCHC 34.5     BMP:   Recent Labs   Lab 09/02/23 0902   *   *   K 4.9   CL 93*   CO2 26   BUN 24*   CREATININE 1.5*   CALCIUM 11.0*     CMP:   Recent Labs   Lab 09/02/23 0902   *   CALCIUM 11.0*   ALBUMIN 4.5   PROT 9.8*   *   K 4.9   CO2 26   CL 93*   BUN 24*   CREATININE 1.5*   ALKPHOS 136*   ALT 20   AST 34   BILITOT 1.4*       Significant Diagnostics:  I have reviewed all pertinent imaging results/findings within the past 24 hours.  CT: I have reviewed all pertinent results/findings within the past 24 hours and my personal findings are:  See below    Initial CT scan    [phone] 9/2/2023 STAT     Narrative & Impression  EXAMINATION:  CT ABDOMEN PELVIS WITHOUT CONTRAST     CLINICAL HISTORY:  Abdominal abscess/infection suspected;     TECHNIQUE:  Routine CT abdomen and pelvis performed without IV contrast.  Coronal and sagittal reformatted images obtained.     COMPARISON:  March 9, 2023     FINDINGS:  Lung bases: No acute findings.     Bones: No acute osseous abnormality or  suspicious bone lesions.     Kidneys and ureters: No evidence of urolithiasis, hydronephrosis or other acute findings.     Stomach and bowel: Stable left periumbilical hernia containing a short segment of dilated small bowel.  Stable left inguinal hernia containing a short segment of nondilated sigmoid colon.  Enlarging right inguinal hernia containing a nondilated segment of the cecum and sigmoid colon.  Stable diffuse small bowel dilation with air-fluid levels.  The transition point is not well evaluated without oral contrast.  Partial small bowel resection and anastomosis in the right lower quadrant with collapse of the more distal small bowel, possibly representing anastomotic stricture and site of obstruction.  No colonic dilation is present.  No acute inflammatory changes identified..     Appendix: No evidence of appendicitis.     Liver: Unremarkable for noncontrast technique.     Gallbladder and bile ducts: Unremarkable.     Pancreas: Unremarkable for noncontrast technique.     Spleen: Unremarkable for noncontrast technique.     Adrenals: Unremarkable.     Bladder: Unremarkable.     Aorta: No aneurysm.     Reproductive organs: Within normal limits.     Miscellaneous: No free intraperitoneal fluid, free air or abscess identified. No pathologic lymphadenopathy.     Impression:     Persistent distal small-bowel obstruction with a possible transition point at an anastomotic stricture in the right lower quadrant however evaluation is limited without oral contrast.  Persistent bowel containing inguinal and left periumbilical hernias which do not appear to represent the site of obstruction.  No other acute findings.     Note: All CT scans at this facility are performed  using dose modulation techniques as appropriate to performed exam including the following:  automated exposure control; adjustment of mA and/or kV according to the patients size (this includes techniques or standardized protocols for targeted exams  where dose is matched to indication/reason for exam: i.e. extremities or head);  iterative reconstruction technique.        Electronically signed by: Domo Frias MD  Date:                                            09/02/2023  Time:                                           08:48    EXAMINATION:  CT ABDOMEN PELVIS WITH CONTRAST     CLINICAL HISTORY:  Bowel obstruction suspected; abdominal pain     COMPARISON:  Noncontrast abdominal CT from 2 hours ago.     FINDINGS:  Lung bases: No acute findings.     Bones: No acute osseous abnormality or suspicious bone lesions.     Stomach and bowel: No oral contrast extends distal to the stomach and proximal duodenum again limiting evaluation of the transition point.  Stable left periumbilical hernia containing a short segment of dilated small bowel. Stable left inguinal hernia containing a short segment of nondilated sigmoid colon.  Stable right inguinal hernia containing a nondilated segment of the cecum, sigmoid colon and possibly the terminal ileum which appears compressed.  Stable diffuse small bowel dilation with air-fluid levels. The transition point is again not well evaluated. Partial small bowel resection and anastomosis in the right lower quadrant.  There are a few segments of collapsed segments of small bowel in the right mid and lower quadrant as well as just posterior to the periumbilical hernia and right inguinal hernia..  No colonic dilation is present.  No acute inflammatory changes identified..     Appendix: No evidence of appendicitis.     Liver: Unremarkable.     Gallbladder and bile ducts: Unremarkable.     Pancreas: Unremarkable.     Spleen: Unremarkable.     Adrenals: Unremarkable.     Bladder: Unremarkable.     Aorta: No aneurysm.     Reproductive organs: Within normal limits.     Miscellaneous: No free intraperitoneal fluid, free air or abscess identified. No pathologic lymphadenopathy.     Impression:     Stable distal small-bowel obstruction.  No  "oral contrast extends distal to the stomach and proximal duodenum again limiting evaluation of the transition point.  Abdominal x-ray follow up to evaluate transit of oral contrast may be of benefit.  Possible transition points at an anastomotic stricture in the right lower quadrant versus the periumbilical hernia or right inguinal hernia.  Left inguinal hernia which does not appear to represent the site of obstruction. No other acute findings.     All CT scans at this facility are performed  using dose modulation techniques as appropriate to performed exam including the following:  automated exposure control; adjustment of mA and/or kV according to the patients size (this includes techniques or standardized protocols for targeted exams where dose is matched to indication/reason for exam: i.e. extremities or head);  iterative reconstruction technique.        Electronically signed by: Domo Frias MD  Date:                                            09/02/2023  Time:                                           10:48           Exam Ended: 09/02/23 10:20             Transesophageal echo ([facility]) with doppler, color, cardioversion and contrast    Height:  5' 10" (1.778 m)   Weight:  71.4 kg (157 lb 6.5 oz)   Blood Pressure:  164/103    Date of Study:  8/9/23   Ordering Provider:  Tere Johns MD   Clinical Indications:  PAF (paroxysmal atrial fibrillation) [I48.0 (ICD-10-CM)]       Interpreting Physicians  Performing Staff   Tere Johns MD Tech:  Teri Zurita, RT        Reason for Exam  Priority: Routine  Dx: PAF (paroxysmal atrial fibrillation) [I48.0 (ICD-10-CM)]     View Images Vital Vitrea     Show images for Transesophageal echo ([facility]) with possible cardioversion  Summary         Left Ventricle: Severe global hypokinesis present. There is severely reduced systolic function with a visually estimated ejection fraction of 15 - 20%.    Left Atrium: Left atrium is mildly dilated. The left atrial appendage " appears normal. Appendage velocity is reduced at less than 40 cm/sec. There is no thrombus in the left atrial appendage.    Right Ventricle: Mild right ventricular enlargement. Systolic function is moderately reduced.    Right Atrium: Normal right atrial size. Lead present in the right atrium.    Aortic Valve: There is a bioprosthetic valve in the aortic position that is well-seated. There is normal leaflet mobility.    Mitral Valve: The mitral valve is structurally normal. There is normal leaflet mobility. There is no stenosis. There is moderate regurgitation.    Tricuspid Valve: The tricuspid valve is structurally normal. There is normal leaflet mobility. There is no significant regurgitation.    Pulmonic Valve: Not assessed due to poor acoustic window.       Assessment/Plan:     * SBO (small bowel obstruction)  CT scan is concerning for a bowel obstruction.  He had a similar episode in August.  The patient has not been offered surgical intervention for bowel obstructions in the past or hernia repair due to his nearly prohibitive cardiac risk from his cardiomyopathy.      Patient should be gently hydrated   NPO   Repeat x-rays in the morning   May require additional contrast studies   Surgical intervention only if all conservative manage fail including limited diet to clear liquids and patient understands that he may not survive surgery due to his cardiac disease.    PAF (paroxysmal atrial fibrillation)  Hold anticoagulation for the next 24 hours.  After the short-acting anticoagulation if necessary.      Patient needs to be off his novel anticoagulant, Xarelto, for 48 hours before any surgical intervention    CKD (chronic kidney disease), stage III  Management per Medicine    SOB (shortness of breath)  Likely as a result of his severe cardiac dysfunction    S/P AVR (aortic valve replacement)  Hold anticoagulation for now, see above for atrial fibrillation    Chronic combined systolic and diastolic congestive  heart failure  Patient has significant cardiac disease with an extremely low ejection fraction.  This combined with his valve your disease makes him extremely high risk if not prohibitive risk for surgical intervention.      I would recommend a cardiac consultation to discuss his risk of general anesthetic should it become necessary.         Echo 08/09/2023    Interpretation Summary  · The left ventricle is mildly enlarged with severely decreased systolic function.  · The estimated ejection fraction is 15 - 20%.  · Grade II left ventricular diastolic dysfunction.  · Moderate right ventricular enlargement with severely reduced right ventricular systolic function.  · Severe left atrial enlargement.  · Severe right atrial enlargement.  · There is a 23 mm Freire II porcine bioprosthetic aortic valve present. There is trivial central aortic insufficiency present.  · The aortic valve mean gradient is 6 mmHg with a dimensionless index of 0.27.  · Moderate-to-severe mitral regurgitation.  · Moderate to severe tricuspid regurgitation.  · Moderate pulmonic regurgitation.  · The estimated PA systolic pressure is greater than 54 mmHg.  · There is pulmonary hypertension.  .    Hypertension  Management per hospital Medicine      VTE Risk Mitigation (From admission, onward)           Ordered     IP VTE HIGH RISK PATIENT  Once         09/02/23 1132     Place sequential compression device  Until discontinued         09/02/23 1132     Reason for No Pharmacological VTE Prophylaxis  Once        Question:  Reasons:  Answer:  Already adequately anticoagulated on oral Anticoagulants    09/02/23 1132                    Thank you for your consult. I will follow-up with patient. Please contact us if you have any additional questions.    Andrew Hill MD  General Surgery  [facility] - Emergency Dept.

## 2023-09-02 NOTE — H&P
"[facility] - Emergency Dept.  Hospital Medicine  History & Physical    Patient Name: Edmund Chu  MRN: [id number]  Patient Class: IP- Inpatient  Admission Date: 9/2/2023  Attending Physician: Hillary Dunbar MD   Primary Care Provider: Srini Scott MD         Patient information was obtained from patient, past medical records and ER records.     Subjective:     Principal Problem:SBO (small bowel obstruction)    Chief Complaint:   Chief Complaint   Patient presents with    Abdominal Pain     Started 7pm last night,         HPI: Mr Chu is a 82 year old male with h/o hypertension, CHF (EF 15-20%), AVR 2016 ([facility]), ICD placement in 2022, h/o colon cancer s/p colectomy in 2009 at [facility] and CKD who presents to the Emergency Department for evaluation of abd pain which onset last night. Pt reports abd pain is centralized at abdominal hernia. He reports three "normal" bowel movements yesterday.  He denies any previous SBO.  He did have one episode of vomiting while in ED.  Work in ED notable for WBC 13, Creatinine 1.5, bili 1.4.  CT ab/pelvis shows Stable distal small-bowel obstruction, Persistent bowel containing inguinal and left periumbilical hernias.  General surgery was consulted, recommended admission under hospital medicine d/t patients multiple co-morbidities and surgery will see in consultation.  Patient will be admitted to hospital medicine for SBO.       Code Status   Surrogate Decision maker       Past Medical History:   Diagnosis Date    Aortic valve prosthesis present     CHF (congestive heart failure)     CKD (chronic kidney disease) stage 3, GFR 30-59 ml/min     Colon cancer     Hypertension     PAF (paroxysmal atrial fibrillation)        Past Surgical History:   Procedure Laterality Date    AORTIC VALVE REPLACEMENT  2016    COLON RESECTION      TRANSESOPHAGEAL ECHOCARDIOGRAM WITH POSSIBLE CARDIOVERSION ([facility] W/ POSS CARDIOVERSION) N/A 8/9/2023    Procedure: Transesophageal echo ([facility]) " intra-procedure log documentation;  Surgeon: Tere Johns MD;  Location: [facility] CATH LAB;  Service: Cardiology;  Laterality: N/A;       Review of patient's allergies indicates:   Allergen Reactions    Eliquis [apixaban] Swelling    Sacubitril-valsartan Hives and Swelling    Amlodipine Edema     Ankle    Lisinopril Other (See Comments)    Mefloquine Hives and Itching     Anti malarial      Spironolactone Edema       No current facility-administered medications on file prior to encounter.     Current Outpatient Medications on File Prior to Encounter   Medication Sig    carvediloL (COREG) 12.5 MG tablet Take 25 mg by mouth 2 (two) times daily with meals.    coenzyme Q10 100 mg capsule Take 100 mg by mouth once daily.    LORazepam (ATIVAN) 0.5 MG tablet Take 1 mg by mouth 2 (two) times daily.    olmesartan (BENICAR) 40 MG tablet Take 20 mg by mouth once daily.    rivaroxaban (XARELTO) 10 mg Tab Take 2 tablets (20 mg total) by mouth daily with dinner or evening meal.    torsemide (DEMADEX) 20 MG Tab Take 30 mg by mouth once daily.    tumeric-ging-olive-oreg-capryl 100 mg-150 mg- 50 mg-150 mg Cap Take 1 capsule by mouth once daily.    dapagliflozin (FARXIGA) 10 mg tablet Take 10 mg by mouth once daily.    mometasone 0.1% (ELOCON) 0.1 % cream As needed cream     Family History       Problem Relation (Age of Onset)    Colon cancer Father    Hypertension Mother          Tobacco Use    Smoking status: Former    Smokeless tobacco: Never   Substance and Sexual Activity    Alcohol use: Yes    Drug use: Never    Sexual activity: Not on file     Review of Systems   Gastrointestinal:  Positive for abdominal distention, nausea and vomiting.     Objective:     Vital Signs (Most Recent):  Temp: 98.2 °F (36.8 °C) (09/02/23 0933)  Pulse: 76 (09/02/23 1000)  Resp: 18 (09/02/23 1000)  BP: (!) 138/96 (09/02/23 1000)  SpO2: 97 % (09/02/23 1000) Vital Signs (24h Range):  Temp:  [97.6 °F (36.4 °C)-98.2 °F (36.8 °C)]  98.2 °F (36.8 °C)  Pulse:  [74-78] 76  Resp:  [17-18] 18  SpO2:  [97 %-99 %] 97 %  BP: (138-160)/() 138/96     Weight: 69.4 kg (153 lb)  Body mass index is 21.95 kg/m².     Physical Exam  Vitals and nursing note reviewed.   Constitutional:       Appearance: Normal appearance.   Cardiovascular:      Rate and Rhythm: Normal rate and regular rhythm.      Pulses: Normal pulses.      Heart sounds: Normal heart sounds.   Pulmonary:      Effort: Pulmonary effort is normal.      Breath sounds: Normal breath sounds. No wheezing.   Abdominal:      Comments: Mid abdominal hernia present, soft, ab non-tender, mild distension, hypoactive bowel sounds   Musculoskeletal:         General: No swelling. Normal range of motion.   Skin:     General: Skin is warm and dry.   Neurological:      Mental Status: He is alert and oriented to person, place, and time.                Significant Labs: All pertinent labs within the past 24 hours have been reviewed.    Significant Imaging: I have reviewed all pertinent imaging results/findings within the past 24 hours.    Assessment/Plan:     * SBO (small bowel obstruction)  - conservative mgt at this time  - NPO  - general surgery consulted, will follow up recs  - antiemetics as needed  - holding off on IVF d/t ef 15%      PAF (paroxysmal atrial fibrillation)  Patient with Paroxysmal (<7 days) atrial fibrillation which is controlled currently with Beta Blocker. Patient is currently in sinus rhythm.[id number] Score: 3. . Anticoagulation not indicated due to xarelto.    - holding home xarelto at this time d/t possible surgical intervention     CKD (chronic kidney disease), stage III  - ranges from 1-1.5 over the last year  - today 1.5  - continue to avoid nephrotoxic agents and renal dose meds as able  - repeat labs in am       ICD (implantable cardioverter-defibrillator) in place  - follows with Dr. Cori Campos       Chronic combined systolic and diastolic congestive heart failure  Patient is  identified as having Combined Systolic and Diastolic heart failure that is Chronic. CHF is currently controlled. Latest ECHO performed and demonstrates- Results for orders placed during the hospital encounter of 12/19/22    Echo    Interpretation Summary  · The left ventricle is mildly enlarged with severely decreased systolic function.  · The estimated ejection fraction is 15 - 20%.  · Grade II left ventricular diastolic dysfunction.  · Moderate right ventricular enlargement with severely reduced right ventricular systolic function.  · Severe left atrial enlargement.  · Severe right atrial enlargement.  · There is a 23 mm Freire II porcine bioprosthetic aortic valve present. There is trivial central aortic insufficiency present.  · The aortic valve mean gradient is 6 mmHg with a dimensionless index of 0.27.  · Moderate-to-severe mitral regurgitation.  · Moderate to severe tricuspid regurgitation.  · Moderate pulmonic regurgitation.  · The estimated PA systolic pressure is greater than 54 mmHg.  · There is pulmonary hypertension.  . Continue Beta Blocker and Furosemide and monitor clinical status closely. Monitor on telemetry. Patient is off CHF pathway.  Monitor strict Is&Os and daily weights.  Place on fluid restriction of 1.5 L. Cardiology has not been consulted. Continue to stress to patient importance of self efficacy and  on diet for CHF.     Hypertension  - continue home BB  - trend bp and adjust meds as necessary       VTE Risk Mitigation (From admission, onward)         Ordered     IP VTE HIGH RISK PATIENT  Once         09/02/23 1132     Place sequential compression device  Until discontinued         09/02/23 1132     Reason for No Pharmacological VTE Prophylaxis  Once        Question:  Reasons:  Answer:  Already adequately anticoagulated on oral Anticoagulants    09/02/23 1132                           Ruba Vazquez NP  Department of Hospital Medicine  '[facility] - Emergency Dept.

## 2023-09-02 NOTE — ASSESSMENT & PLAN NOTE
Patient is identified as having Combined Systolic and Diastolic heart failure that is Chronic. CHF is currently controlled. Latest ECHO performed and demonstrates- Results for orders placed during the hospital encounter of 12/19/22    Echo    Interpretation Summary  · The left ventricle is mildly enlarged with severely decreased systolic function.  · The estimated ejection fraction is 15 - 20%.  · Grade II left ventricular diastolic dysfunction.  · Moderate right ventricular enlargement with severely reduced right ventricular systolic function.  · Severe left atrial enlargement.  · Severe right atrial enlargement.  · There is a 23 mm Freire II porcine bioprosthetic aortic valve present. There is trivial central aortic insufficiency present.  · The aortic valve mean gradient is 6 mmHg with a dimensionless index of 0.27.  · Moderate-to-severe mitral regurgitation.  · Moderate to severe tricuspid regurgitation.  · Moderate pulmonic regurgitation.  · The estimated PA systolic pressure is greater than 54 mmHg.  · There is pulmonary hypertension.  . Continue Beta Blocker and Furosemide and monitor clinical status closely. Monitor on telemetry. Patient is off CHF pathway.  Monitor strict Is&Os and daily weights.  Place on fluid restriction of 1.5 L. Cardiology has not been consulted. Continue to stress to patient importance of self efficacy and  on diet for CHF.

## 2023-09-02 NOTE — ASSESSMENT & PLAN NOTE
CT scan is concerning for a bowel obstruction.  He had a similar episode in August.  The patient has not been offered surgical intervention for bowel obstructions in the past or hernia repair due to his nearly prohibitive cardiac risk from his cardiomyopathy.      Patient should be gently hydrated   NPO   Repeat x-rays in the morning   May require additional contrast studies   Surgical intervention only if all conservative manage fail including limited diet to clear liquids and patient understands that he may not survive surgery due to his cardiac disease.

## 2023-09-02 NOTE — ASSESSMENT & PLAN NOTE
- ranges from 1-1.5 over the last year  - today 1.5  - continue to avoid nephrotoxic agents and renal dose meds as able  - repeat labs in am

## 2023-09-02 NOTE — ASSESSMENT & PLAN NOTE
Patient has significant cardiac disease with an extremely low ejection fraction.  This combined with his valve your disease makes him extremely high risk if not prohibitive risk for surgical intervention.      I would recommend a cardiac consultation to discuss his risk of general anesthetic should it become necessary.         Echo 08/09/2023    Interpretation Summary  · The left ventricle is mildly enlarged with severely decreased systolic function.  · The estimated ejection fraction is 15 - 20%.  · Grade II left ventricular diastolic dysfunction.  · Moderate right ventricular enlargement with severely reduced right ventricular systolic function.  · Severe left atrial enlargement.  · Severe right atrial enlargement.  · There is a 23 mm Freire II porcine bioprosthetic aortic valve present. There is trivial central aortic insufficiency present.  · The aortic valve mean gradient is 6 mmHg with a dimensionless index of 0.27.  · Moderate-to-severe mitral regurgitation.  · Moderate to severe tricuspid regurgitation.  · Moderate pulmonic regurgitation.  · The estimated PA systolic pressure is greater than 54 mmHg.  · There is pulmonary hypertension.  .

## 2023-09-02 NOTE — HOSPITAL COURSE
Patient underwent a CT scan there was concern for a bowel obstruction.  Surgery was consulted.  The patient is resting comfortably.  He is not report any abdominal pain.  He is not had any additional nausea or vomiting    09/03/2023.  States he is feeling better passing flatus no bowel movements.  Abdominal x-rays show some dilated loops of bowel.  This may chronic.  Trial of clear liquids.  Repeat x-rays tomorrow.  May need small-bowel follow-through when radiology services are back in the facility    09/04/2023.  No abdominal pain.  Multiple bowel movements.  Tolerated clear liquids.  States he does not want surgery.  Is asking to eat regular food.

## 2023-09-03 LAB
ALBUMIN SERPL BCP-MCNC: 3.1 G/DL (ref 3.5–5.2)
ALP SERPL-CCNC: 90 U/L (ref 55–135)
ALT SERPL W/O P-5'-P-CCNC: 15 U/L (ref 10–44)
ANION GAP SERPL CALC-SCNC: 6 MMOL/L (ref 8–16)
ANISOCYTOSIS BLD QL SMEAR: SLIGHT
APTT PPP: 31.8 SEC (ref 21–32)
AST SERPL-CCNC: 21 U/L (ref 10–40)
BASOPHILS # BLD AUTO: 0.01 K/UL (ref 0–0.2)
BASOPHILS # BLD AUTO: 0.02 K/UL (ref 0–0.2)
BASOPHILS NFR BLD: 0.2 % (ref 0–1.9)
BASOPHILS NFR BLD: 0.4 % (ref 0–1.9)
BILIRUB SERPL-MCNC: 1.7 MG/DL (ref 0.1–1)
BUN SERPL-MCNC: 25 MG/DL (ref 8–23)
CALCIUM SERPL-MCNC: 9.1 MG/DL (ref 8.7–10.5)
CHLORIDE SERPL-SCNC: 103 MMOL/L (ref 95–110)
CO2 SERPL-SCNC: 26 MMOL/L (ref 23–29)
CREAT SERPL-MCNC: 1.2 MG/DL (ref 0.5–1.4)
DACRYOCYTES BLD QL SMEAR: ABNORMAL
DIFFERENTIAL METHOD: ABNORMAL
DIFFERENTIAL METHOD: ABNORMAL
EOSINOPHIL # BLD AUTO: 0 K/UL (ref 0–0.5)
EOSINOPHIL # BLD AUTO: 0 K/UL (ref 0–0.5)
EOSINOPHIL NFR BLD: 0 % (ref 0–8)
EOSINOPHIL NFR BLD: 0.4 % (ref 0–8)
ERYTHROCYTE [DISTWIDTH] IN BLOOD BY AUTOMATED COUNT: 13.2 % (ref 11.5–14.5)
ERYTHROCYTE [DISTWIDTH] IN BLOOD BY AUTOMATED COUNT: 13.8 % (ref 11.5–14.5)
EST. GFR  (NO RACE VARIABLE): >60 ML/MIN/1.73 M^2
GLUCOSE SERPL-MCNC: 94 MG/DL (ref 70–110)
HCT VFR BLD AUTO: 38 % (ref 40–54)
HCT VFR BLD AUTO: 39.7 % (ref 40–54)
HGB BLD-MCNC: 13.1 G/DL (ref 14–18)
HGB BLD-MCNC: 13.3 G/DL (ref 14–18)
IMM GRANULOCYTES # BLD AUTO: 0.01 K/UL (ref 0–0.04)
IMM GRANULOCYTES # BLD AUTO: 0.01 K/UL (ref 0–0.04)
IMM GRANULOCYTES NFR BLD AUTO: 0.2 % (ref 0–0.5)
IMM GRANULOCYTES NFR BLD AUTO: 0.2 % (ref 0–0.5)
INR PPP: 1.3 (ref 0.8–1.2)
LYMPHOCYTES # BLD AUTO: 0.7 K/UL (ref 1–4.8)
LYMPHOCYTES # BLD AUTO: 1 K/UL (ref 1–4.8)
LYMPHOCYTES NFR BLD: 10.8 % (ref 18–48)
LYMPHOCYTES NFR BLD: 20.1 % (ref 18–48)
MCH RBC QN AUTO: 32.3 PG (ref 27–31)
MCH RBC QN AUTO: 32.4 PG (ref 27–31)
MCHC RBC AUTO-ENTMCNC: 33.5 G/DL (ref 32–36)
MCHC RBC AUTO-ENTMCNC: 34.5 G/DL (ref 32–36)
MCV RBC AUTO: 94 FL (ref 82–98)
MCV RBC AUTO: 97 FL (ref 82–98)
MONOCYTES # BLD AUTO: 0.9 K/UL (ref 0.3–1)
MONOCYTES # BLD AUTO: 1.2 K/UL (ref 0.3–1)
MONOCYTES NFR BLD: 18.4 % (ref 4–15)
MONOCYTES NFR BLD: 19.3 % (ref 4–15)
NEUTROPHILS # BLD AUTO: 3.1 K/UL (ref 1.8–7.7)
NEUTROPHILS # BLD AUTO: 4.3 K/UL (ref 1.8–7.7)
NEUTROPHILS NFR BLD: 60.5 % (ref 38–73)
NEUTROPHILS NFR BLD: 69.5 % (ref 38–73)
NRBC BLD-RTO: 0 /100 WBC
NRBC BLD-RTO: 0 /100 WBC
PLATELET # BLD AUTO: 115 K/UL (ref 150–450)
PLATELET # BLD AUTO: 123 K/UL (ref 150–450)
PLATELET BLD QL SMEAR: ABNORMAL
PMV BLD AUTO: 10.9 FL (ref 9.2–12.9)
PMV BLD AUTO: 11.4 FL (ref 9.2–12.9)
POTASSIUM SERPL-SCNC: 4.6 MMOL/L (ref 3.5–5.1)
PROT SERPL-MCNC: 6.7 G/DL (ref 6–8.4)
PROTHROMBIN TIME: 13.7 SEC (ref 9–12.5)
RBC # BLD AUTO: 4.05 M/UL (ref 4.6–6.2)
RBC # BLD AUTO: 4.11 M/UL (ref 4.6–6.2)
SODIUM SERPL-SCNC: 135 MMOL/L (ref 136–145)
WBC # BLD AUTO: 5.12 K/UL (ref 3.9–12.7)
WBC # BLD AUTO: 6.22 K/UL (ref 3.9–12.7)

## 2023-09-03 PROCEDURE — 25000003 PHARM REV CODE 250: Performed by: NURSE PRACTITIONER

## 2023-09-03 PROCEDURE — 85610 PROTHROMBIN TIME: CPT | Performed by: NURSE PRACTITIONER

## 2023-09-03 PROCEDURE — 11000001 HC ACUTE MED/SURG PRIVATE ROOM

## 2023-09-03 PROCEDURE — 99233 PR SUBSEQUENT HOSPITAL CARE,LEVL III: ICD-10-PCS | Mod: ,,, | Performed by: SURGERY

## 2023-09-03 PROCEDURE — 80053 COMPREHEN METABOLIC PANEL: CPT | Performed by: NURSE PRACTITIONER

## 2023-09-03 PROCEDURE — 99233 SBSQ HOSP IP/OBS HIGH 50: CPT | Mod: ,,, | Performed by: SURGERY

## 2023-09-03 PROCEDURE — 36415 COLL VENOUS BLD VENIPUNCTURE: CPT | Performed by: NURSE PRACTITIONER

## 2023-09-03 PROCEDURE — 85025 COMPLETE CBC W/AUTO DIFF WBC: CPT | Performed by: NURSE PRACTITIONER

## 2023-09-03 PROCEDURE — 85730 THROMBOPLASTIN TIME PARTIAL: CPT | Performed by: NURSE PRACTITIONER

## 2023-09-03 RX ORDER — CARVEDILOL 6.25 MG/1
25 TABLET ORAL 2 TIMES DAILY WITH MEALS
Status: DISCONTINUED | OUTPATIENT
Start: 2023-09-03 | End: 2023-09-04 | Stop reason: HOSPADM

## 2023-09-03 RX ORDER — HEPARIN SODIUM,PORCINE/D5W 25000/250
0-40 INTRAVENOUS SOLUTION INTRAVENOUS CONTINUOUS
Status: DISCONTINUED | OUTPATIENT
Start: 2023-09-03 | End: 2023-09-03

## 2023-09-03 RX ORDER — LORAZEPAM 0.5 MG/1
1 TABLET ORAL EVERY 12 HOURS PRN
Status: DISCONTINUED | OUTPATIENT
Start: 2023-09-03 | End: 2023-09-04 | Stop reason: HOSPADM

## 2023-09-03 RX ADMIN — SODIUM CHLORIDE: 9 INJECTION, SOLUTION INTRAVENOUS at 09:09

## 2023-09-03 RX ADMIN — CARVEDILOL 25 MG: 6.25 TABLET, FILM COATED ORAL at 04:09

## 2023-09-03 NOTE — PROGRESS NOTES
"[facility] - Med Surg 3  Hospital Medicine  Progress Note    Patient Name: Edmund Chu  MRN: [id number]  Patient Class: IP- Inpatient   Admission Date: 9/2/2023  Length of Stay: 1 days  Attending Physician: Hillary Dunbar MD  Primary Care Provider: Srini Scott MD        Subjective:     Principal Problem:SBO (small bowel obstruction)        HPI:  Mr Chu is a 82 year old male with h/o hypertension, CHF (EF 15-20%), AVR 2016 ([facility]), ICD placement in 2022, h/o colon cancer s/p colectomy in 2009 at [facility] and CKD who presents to the Emergency Department for evaluation of abd pain which onset last night. Pt reports abd pain is centralized at abdominal hernia. He reports three "normal" bowel movements yesterday.  He denies any previous SBO.  He did have one episode of vomiting while in ED.  Work in ED notable for WBC 13, Creatinine 1.5, bili 1.4.  CT ab/pelvis shows Stable distal small-bowel obstruction, Persistent bowel containing inguinal and left periumbilical hernias.  General surgery was consulted, recommended admission under hospital medicine d/t patients multiple co-morbidities and surgery will see in consultation.  Patient will be admitted to hospital medicine for SBO.       Code Status   Surrogate Decision maker       Overview/Hospital Course:  9/3: patient went walking up and down halls this am and had a large BM per nurse. He denies any further ab pain, N.V.  Repeat KUB from this am (prior to BM) showed Persistent mild small bowel dilation with air-fluid levels suggesting bowel obstruction..  General surgery following, holding home xarelto in case need for sx arises, discussed with general sx will place on heparin gtt .  Advanced diet to clear liquid      Interval History: ab pain improved    Review of Systems  Objective:     Vital Signs (Most Recent):  Temp: 98.3 °F (36.8 °C) (09/03/23 1139)  Pulse: 69 (09/03/23 1139)  Resp: 17 (09/03/23 1139)  BP: 131/83 (09/03/23 1139)  SpO2: 99 % (09/03/23 1139) " Vital Signs (24h Range):  Temp:  [97.5 °F (36.4 °C)-98.4 °F (36.9 °C)] 98.3 °F (36.8 °C)  Pulse:  [63-84] 69  Resp:  [16-19] 17  SpO2:  [96 %-99 %] 99 %  BP: (124-155)/(82-98) 131/83     Weight: 69.4 kg (153 lb)  Body mass index is 21.95 kg/m².    Intake/Output Summary (Last 24 hours) at 9/3/2023 1324  Last data filed at 9/3/2023 0636  Gross per 24 hour   Intake 551.62 ml   Output --   Net 551.62 ml         Physical Exam  Vitals and nursing note reviewed.     Vitals and nursing note reviewed.   Constitutional:       Appearance: Normal appearance.   Cardiovascular:      Rate and Rhythm: Normal rate and regular rhythm.      Pulses: Normal pulses.      Heart sounds: Normal heart sounds.   Pulmonary:      Effort: Pulmonary effort is normal.      Breath sounds: Normal breath sounds. No wheezing.   Abdominal:      Comments: Mid abdominal hernia present, soft, ab non-tender, mild distension, +bowel sounds   Musculoskeletal:         General: No swelling. Normal range of motion.   Skin:     General: Skin is warm and dry.   Neurological:      Mental Status: He is alert and oriented to person, place, and time.         Significant Labs: All pertinent labs within the past 24 hours have been reviewed.    Significant Imaging: I have reviewed all pertinent imaging results/findings within the past 24 hours.      Assessment/Plan:      * SBO (small bowel obstruction)  - conservative mgt at this time  - NPO  - general surgery consulted, will follow up recs  - antiemetics as needed    9/3: BM today, advanced to CLD, surgery following       PAF (paroxysmal atrial fibrillation)  Patient with Paroxysmal (<7 days) atrial fibrillation which is controlled currently with Beta Blocker. Patient is currently in sinus rhythm.[id number] Score: 3. . Anticoagulation not indicated due to xarelto.    - holding home xarelto at this time d/t possible surgical intervention    - 9/3: discussed with general surgery still hold xarelto in case need for  surgery arises; hep gtt     CKD (chronic kidney disease), stage III  - ranges from 1-1.5 over the last year  - today 1.5  - continue to avoid nephrotoxic agents and renal dose meds as able  - repeat labs in am     9/3: creatinine improved to 1.2    ICD (implantable cardioverter-defibrillator) in place  - follows with Dr. Cori Campos       Chronic combined systolic and diastolic congestive heart failure  Patient is identified as having Combined Systolic and Diastolic heart failure that is Chronic. CHF is currently controlled. Latest ECHO performed and demonstrates- Results for orders placed during the hospital encounter of 12/19/22    Echo    Interpretation Summary  · The left ventricle is mildly enlarged with severely decreased systolic function.  · The estimated ejection fraction is 15 - 20%.  · Grade II left ventricular diastolic dysfunction.  · Moderate right ventricular enlargement with severely reduced right ventricular systolic function.  · Severe left atrial enlargement.  · Severe right atrial enlargement.  · There is a 23 mm Freire II porcine bioprosthetic aortic valve present. There is trivial central aortic insufficiency present.  · The aortic valve mean gradient is 6 mmHg with a dimensionless index of 0.27.  · Moderate-to-severe mitral regurgitation.  · Moderate to severe tricuspid regurgitation.  · Moderate pulmonic regurgitation.  · The estimated PA systolic pressure is greater than 54 mmHg.  · There is pulmonary hypertension.  . Continue Beta Blocker and Furosemide and monitor clinical status closely. Monitor on telemetry. Patient is off CHF pathway.  Monitor strict Is&Os and daily weights.  Place on fluid restriction of 1.5 L. Cardiology has not been consulted. Continue to stress to patient importance of self efficacy and  on diet for CHF.     9/3: euvolemic     Hypertension  - continue home BB  - trend bp and adjust meds as necessary     9/3: restarted home BB       VTE Risk Mitigation (From  admission, onward)         Ordered     heparin 25,000 units in dextrose 5% (100 units/ml) IV bolus from bag - ADDITIONAL PRN BOLUS - 60 units/kg  As needed (PRN)        Question:  Heparin Infusion Adjustment (DO NOT MODIFY ANSWER)  Answer:  [email or web address]\epic\Images\Pharmacy\HeparinInfusions\heparin LOW INTENSITY nomogram for [facility] [id number].pdf    09/03/23 1320     heparin 25,000 units in dextrose 5% (100 units/ml) IV bolus from bag - ADDITIONAL PRN BOLUS - 30 units/kg  As needed (PRN)        Question:  Heparin Infusion Adjustment (DO NOT MODIFY ANSWER)  Answer:  \\[email or web address]\epic\Images\Pharmacy\HeparinInfusions\heparin LOW INTENSITY nomogram for [facility] [id number].pdf    09/03/23 1320     heparin 25,000 units in dextrose 5% 250 mL (100 units/mL) infusion LOW INTENSITY nomogram - [facility]  Continuous        Question Answer Comment   Heparin Infusion Adjustment (DO NOT MODIFY ANSWER) \\[email or web address]\epic\Images\Pharmacy\HeparinInfusions\heparin LOW INTENSITY nomogram for [facility] [id number]    Begin at (in units/kg/hr) 12        09/03/23 1320     IP VTE HIGH RISK PATIENT  Once         09/02/23 1132     Place sequential compression device  Until discontinued         09/02/23 1132     Reason for No Pharmacological VTE Prophylaxis  Once        Question:  Reasons:  Answer:  Already adequately anticoagulated on oral Anticoagulants    09/02/23 1132                Discharge Planning   ONEIDA:      Code Status: Full Code   Is the patient medically ready for discharge?:     Reason for patient still in hospital (select all that apply): Treatment, Imaging and Consult recommendations                     Ruba Vazquez NP  Department of Hospital Medicine   [facility] - Med Surg 3

## 2023-09-03 NOTE — HOSPITAL COURSE
The patient presented with abdominal pain and vomiting. Workup in the ED was concerning for SBO as dilated loops of small bowel and several hernias noted on CT. General surgery  was consulted. Conservative management pursued. He was placed on bowel rest. Repeat KUB from 9/3 (prior to BM) showed persistent mild small bowel dilation with air-fluid levels suggesting bowel obstruction. As he was having bowel movements, diet was advanced to clear liquid. He tolerated clear liquids. Repeat imaging on 9/4 with normal bowel gas pattern. His diet was advanced to vegetarian. He tolerated solid food, he was cleared for discharge from surgical standpoint. Patient seen and examined, stable for discharge.

## 2023-09-03 NOTE — CARE UPDATE
Discussed with patient starting hep gtt in place of home xarelto, discussed risk/benifits.  Patient states that he should be stopping xarelto soon as he is about 3 weeks post cardioversion.  He has an upcoming appointment with Dr. Johns wednesday and is planning to discuss this with her.  At this time he does not want hep gtt initiated.

## 2023-09-03 NOTE — ASSESSMENT & PLAN NOTE
- conservative mgt at this time  - NPO  - general surgery consulted, will follow up recs  - antiemetics as needed    9/3: BM today, advanced to [facility]D, surgery following

## 2023-09-03 NOTE — ASSESSMENT & PLAN NOTE
- ranges from 1-1.5 over the last year  - today 1.5  - continue to avoid nephrotoxic agents and renal dose meds as able  - repeat labs in am     9/3: creatinine improved to 1.2

## 2023-09-03 NOTE — SUBJECTIVE & OBJECTIVE
Interval History: ab pain improved    Review of Systems  Objective:     Vital Signs (Most Recent):  Temp: 98.3 °F (36.8 °C) (09/03/23 1139)  Pulse: 69 (09/03/23 1139)  Resp: 17 (09/03/23 1139)  BP: 131/83 (09/03/23 1139)  SpO2: 99 % (09/03/23 1139) Vital Signs (24h Range):  Temp:  [97.5 °F (36.4 °C)-98.4 °F (36.9 °C)] 98.3 °F (36.8 °C)  Pulse:  [63-84] 69  Resp:  [16-19] 17  SpO2:  [96 %-99 %] 99 %  BP: (124-155)/(82-98) 131/83     Weight: 69.4 kg (153 lb)  Body mass index is 21.95 kg/m².    Intake/Output Summary (Last 24 hours) at 9/3/2023 1324  Last data filed at 9/3/2023 0636  Gross per 24 hour   Intake 551.62 ml   Output --   Net 551.62 ml         Physical Exam  Vitals and nursing note reviewed.     Vitals and nursing note reviewed.   Constitutional:       Appearance: Normal appearance.   Cardiovascular:      Rate and Rhythm: Normal rate and regular rhythm.      Pulses: Normal pulses.      Heart sounds: Normal heart sounds.   Pulmonary:      Effort: Pulmonary effort is normal.      Breath sounds: Normal breath sounds. No wheezing.   Abdominal:      Comments: Mid abdominal hernia present, soft, ab non-tender, mild distension, +bowel sounds   Musculoskeletal:         General: No swelling. Normal range of motion.   Skin:     General: Skin is warm and dry.   Neurological:      Mental Status: He is alert and oriented to person, place, and time.         Significant Labs: All pertinent labs within the past 24 hours have been reviewed.    Significant Imaging: I have reviewed all pertinent imaging results/findings within the past 24 hours.

## 2023-09-03 NOTE — ASSESSMENT & PLAN NOTE
CT scan is concerning for a bowel obstruction.  He had a similar episode in August.  The patient has not been offered surgical intervention for bowel obstructions in the past or hernia repair due to his nearly prohibitive cardiac risk from his cardiomyopathy.    Small-bowel films showed dilated of bowel still bowel obstructions reports passing flatus and no abdominal distention  Patient should be gently hydrated per Medicine   Trial of clear liquids   Repeat x-rays in the morning   May require additional contrast studies , small-bowel follow-through when Radiology physicians are available    Surgical intervention only if all conservative manage fail including limited diet to clear liquids and patient understands that he may not survive surgery due to his cardiac disease.

## 2023-09-03 NOTE — SUBJECTIVE & OBJECTIVE
Interval History:  Feeling better.  No nausea.  Passing flatus.  Abdominal x-rays show some dilated small bowel this may be chronic.  Trial of clear liquids    Medications:  Continuous Infusions:   sodium chloride 0.9% 50 mL/hr at 09/03/23 0938     Scheduled Meds:   LORazepam  1 mg Oral Once     PRN Meds:acetaminophen, dextrose 10%, dextrose 10%, glucagon (human recombinant), glucose, glucose, hydrALAZINE, HYDROcodone-acetaminophen, naloxone, ondansetron, pneumoc 20-mari conj-dip cr(PF), sodium chloride 0.9%     Review of patient's allergies indicates:   Allergen Reactions    Eliquis [apixaban] Swelling    Sacubitril-valsartan Hives and Swelling    Amlodipine Edema     Ankle    Lisinopril Other (See Comments)    Mefloquine Hives and Itching     Anti malarial      Spironolactone Edema     Objective:     Vital Signs (Most Recent):  Temp: 98.2 °F (36.8 °C) (09/03/23 0802)  Pulse: 76 (09/03/23 0802)  Resp: 18 (09/03/23 0802)  BP: 124/85 (09/03/23 0802)  SpO2: 99 % (09/03/23 0802) Vital Signs (24h Range):  Temp:  [97.5 °F (36.4 °C)-98.4 °F (36.9 °C)] 98.2 °F (36.8 °C)  Pulse:  [63-88] 76  Resp:  [16-19] 18  SpO2:  [96 %-99 %] 99 %  BP: (124-166)/() 124/85     Weight: 69.4 kg (153 lb)  Body mass index is 21.95 kg/m².    Intake/Output - Last 3 Shifts         09/01 0700  09/02 0659 09/02 0700 09/03 0659 09/03 0700 09/04 0659    I.V. (mL/kg)  551.6 (7.9)     Total Intake(mL/kg)  551.6 (7.9)     Net  +551.6                     Physical Exam  Constitutional:       General: He is not in acute distress.     Appearance: He is well-developed. He is ill-appearing (chronically).   HENT:      Head: Normocephalic and atraumatic.   Eyes:      General: No scleral icterus.     Pupils: Pupils are equal, round, and reactive to light.   Neck:      Thyroid: No thyromegaly.      Vascular: No JVD.      Trachea: No tracheal deviation.   Cardiovascular:      Rate and Rhythm: Normal rate and regular rhythm.      Heart sounds: Normal heart  sounds.   Pulmonary:      Effort: Pulmonary effort is normal.      Breath sounds: Normal breath sounds.   Abdominal:      General: Abdomen is flat. Bowel sounds are normal. There is no distension.      Palpations: Abdomen is soft. There is no mass.      Tenderness: There is no abdominal tenderness. There is no guarding or rebound.      Hernia: A hernia (reducible incisional bilateral inguinal) is present.   Musculoskeletal:         General: Normal range of motion.      Cervical back: Normal range of motion and neck supple.   Lymphadenopathy:      Cervical: No cervical adenopathy.   Skin:     General: Skin is warm and dry.   Neurological:      Mental Status: He is alert and oriented to person, place, and time.   Psychiatric:         Behavior: Behavior normal.         Thought Content: Thought content normal.         Judgment: Judgment normal.          Significant Labs:  I have reviewed all pertinent lab results within the past 24 hours.  CBC:   Recent Labs   Lab 09/03/23 0320   WBC 6.22   RBC 4.05*   HGB 13.1*   HCT 38.0*   *   MCV 94   MCH 32.3*   MCHC 34.5     BMP:   Recent Labs   Lab 09/03/23  0320   GLU 94   *   K 4.6      CO2 26   BUN 25*   CREATININE 1.2   CALCIUM 9.1     CMP:   Recent Labs   Lab 09/03/23  0320   GLU 94   CALCIUM 9.1   ALBUMIN 3.1*   PROT 6.7   *   K 4.6   CO2 26      BUN 25*   CREATININE 1.2   ALKPHOS 90   ALT 15   AST 21   BILITOT 1.7*       Significant Diagnostics:  Abdominal x-rays  [phone] 9/3/2023 STAT     Narrative & Impression  EXAMINATION:  XR ABDOMEN FLAT AND ERECT     CLINICAL HISTORY:  Follow-up after CT scan; small-bowel obstruction     FINDINGS:  No definite free air is seen. Excreted IV contrast from recent CT is seen in the bladder.  The oral contrast from prior CT has diluted and is not visualized radiographically.  Persistent mild small bowel dilation with air-fluid levels suggesting bowel obstruction..  No radiographic evidence of  urolithiasis.     Impression:     See above.        Electronically signed by: Domo Frias MD  Date:                                            09/03/2023  Time:                                           08:06

## 2023-09-03 NOTE — ASSESSMENT & PLAN NOTE
Patient is identified as having Combined Systolic and Diastolic heart failure that is Chronic. CHF is currently controlled. Latest ECHO performed and demonstrates- Results for orders placed during the hospital encounter of 12/19/22    Echo    Interpretation Summary  · The left ventricle is mildly enlarged with severely decreased systolic function.  · The estimated ejection fraction is 15 - 20%.  · Grade II left ventricular diastolic dysfunction.  · Moderate right ventricular enlargement with severely reduced right ventricular systolic function.  · Severe left atrial enlargement.  · Severe right atrial enlargement.  · There is a 23 mm Freire II porcine bioprosthetic aortic valve present. There is trivial central aortic insufficiency present.  · The aortic valve mean gradient is 6 mmHg with a dimensionless index of 0.27.  · Moderate-to-severe mitral regurgitation.  · Moderate to severe tricuspid regurgitation.  · Moderate pulmonic regurgitation.  · The estimated PA systolic pressure is greater than 54 mmHg.  · There is pulmonary hypertension.  . Continue Beta Blocker and Furosemide and monitor clinical status closely. Monitor on telemetry. Patient is off CHF pathway.  Monitor strict Is&Os and daily weights.  Place on fluid restriction of 1.5 L. Cardiology has not been consulted. Continue to stress to patient importance of self efficacy and  on diet for CHF.     9/3: euvolemic

## 2023-09-03 NOTE — ASSESSMENT & PLAN NOTE
Patient with Paroxysmal (<7 days) atrial fibrillation which is controlled currently with Beta Blocker. Patient is currently in sinus rhythm.[id number] Score: 3. . Anticoagulation not indicated due to xarelto.    - holding home xarelto at this time d/t possible surgical intervention    - 9/3: discussed with general surgery still hold xarelto in case need for surgery arises; hep gtt

## 2023-09-03 NOTE — PROGRESS NOTES
[facility]  General Surgery  Progress Note    Subjective:     History of Present Illness:  82-year-old male who is past medical history is most significant for ischemic cardiomyopathy with an ejection fraction in the 15% range, who presented to the emergency room with a 1 day history of abdominal pain and a single episode of vomiting.      The patient was evaluated in the emergency room where a CT scan showed dilated loops of small bowel and several hernias.  The oral contrast from the CT scan essentially remained in the stomach.  There was concern for small-bowel obstruction.      The patient had a similar episode of this last August that resolve without operative intervention.  The patient states he is a vegetarian and usually has 3 bowel movements a day.  He is not noticed any changes in his bowel movements.      The patient has a significant cardiac history with a very low ejection fracture and valvular abnormalities that make him a almost prohibitive operative risk    The patient is anticoagulated on Xarelto      Post-Op Info:  * No surgery found *         Interval History:  Feeling better.  No nausea.  Passing flatus.  Abdominal x-rays show some dilated small bowel this may be chronic.  Trial of clear liquids    Medications:  Continuous Infusions:   sodium chloride 0.9% 50 mL/hr at 09/03/23 0938     Scheduled Meds:   LORazepam  1 mg Oral Once     PRN Meds:acetaminophen, dextrose 10%, dextrose 10%, glucagon (human recombinant), glucose, glucose, hydrALAZINE, HYDROcodone-acetaminophen, naloxone, ondansetron, pneumoc 20-mari conj-dip cr(PF), sodium chloride 0.9%     Review of patient's allergies indicates:   Allergen Reactions    Eliquis [apixaban] Swelling    Sacubitril-valsartan Hives and Swelling    Amlodipine Edema     Ankle    Lisinopril Other (See Comments)    Mefloquine Hives and Itching     Anti malarial      Spironolactone Edema     Objective:     Vital Signs (Most Recent):  Temp: 98.2 °F (36.8  °C) (09/03/23 0802)  Pulse: 76 (09/03/23 0802)  Resp: 18 (09/03/23 0802)  BP: 124/85 (09/03/23 0802)  SpO2: 99 % (09/03/23 0802) Vital Signs (24h Range):  Temp:  [97.5 °F (36.4 °C)-98.4 °F (36.9 °C)] 98.2 °F (36.8 °C)  Pulse:  [63-88] 76  Resp:  [16-19] 18  SpO2:  [96 %-99 %] 99 %  BP: (124-166)/() 124/85     Weight: 69.4 kg (153 lb)  Body mass index is 21.95 kg/m².    Intake/Output - Last 3 Shifts         09/01 0700  09/02 0659 09/02 0700  09/03 0659 09/03 0700  09/04 0659    I.V. (mL/kg)  551.6 (7.9)     Total Intake(mL/kg)  551.6 (7.9)     Net  +551.6                     Physical Exam  Constitutional:       General: He is not in acute distress.     Appearance: He is well-developed. He is ill-appearing (chronically).   HENT:      Head: Normocephalic and atraumatic.   Eyes:      General: No scleral icterus.     Pupils: Pupils are equal, round, and reactive to light.   Neck:      Thyroid: No thyromegaly.      Vascular: No JVD.      Trachea: No tracheal deviation.   Cardiovascular:      Rate and Rhythm: Normal rate and regular rhythm.      Heart sounds: Normal heart sounds.   Pulmonary:      Effort: Pulmonary effort is normal.      Breath sounds: Normal breath sounds.   Abdominal:      General: Abdomen is flat. Bowel sounds are normal. There is no distension.      Palpations: Abdomen is soft. There is no mass.      Tenderness: There is no abdominal tenderness. There is no guarding or rebound.      Hernia: A hernia (reducible incisional bilateral inguinal) is present.   Musculoskeletal:         General: Normal range of motion.      Cervical back: Normal range of motion and neck supple.   Lymphadenopathy:      Cervical: No cervical adenopathy.   Skin:     General: Skin is warm and dry.   Neurological:      Mental Status: He is alert and oriented to person, place, and time.   Psychiatric:         Behavior: Behavior normal.         Thought Content: Thought content normal.         Judgment: Judgment normal.           Significant Labs:  I have reviewed all pertinent lab results within the past 24 hours.  CBC:   Recent Labs   Lab 09/03/23  0320   WBC 6.22   RBC 4.05*   HGB 13.1*   HCT 38.0*   *   MCV 94   MCH 32.3*   MCHC 34.5     BMP:   Recent Labs   Lab 09/03/23  0320   GLU 94   *   K 4.6      CO2 26   BUN 25*   CREATININE 1.2   CALCIUM 9.1     CMP:   Recent Labs   Lab 09/03/23  0320   GLU 94   CALCIUM 9.1   ALBUMIN 3.1*   PROT 6.7   *   K 4.6   CO2 26      BUN 25*   CREATININE 1.2   ALKPHOS 90   ALT 15   AST 21   BILITOT 1.7*       Significant Diagnostics:  Abdominal x-rays  [phone] 9/3/2023 STAT     Narrative & Impression  EXAMINATION:  XR ABDOMEN FLAT AND ERECT     CLINICAL HISTORY:  Follow-up after CT scan; small-bowel obstruction     FINDINGS:  No definite free air is seen. Excreted IV contrast from recent CT is seen in the bladder.  The oral contrast from prior CT has diluted and is not visualized radiographically.  Persistent mild small bowel dilation with air-fluid levels suggesting bowel obstruction..  No radiographic evidence of urolithiasis.     Impression:     See above.        Electronically signed by: Domo Frias MD  Date:                                            09/03/2023  Time:                                           08:06    Assessment/Plan:     * SBO (small bowel obstruction)  CT scan is concerning for a bowel obstruction.  He had a similar episode in August.  The patient has not been offered surgical intervention for bowel obstructions in the past or hernia repair due to his nearly prohibitive cardiac risk from his cardiomyopathy.    Small-bowel films showed dilated of bowel still bowel obstructions reports passing flatus and no abdominal distention  Patient should be gently hydrated per Medicine   Trial of clear liquids   Repeat x-rays in the morning   May require additional contrast studies , small-bowel follow-through when Radiology physicians are  available    Surgical intervention only if all conservative manage fail including limited diet to clear liquids and patient understands that he may not survive surgery due to his cardiac disease.    PAF (paroxysmal atrial fibrillation)  Hold anticoagulation for the next 24 hours.  Patient may be given short-acting anticoagulation if it is felt to be indicated by the medicine service    Patient needs to be off his novel anticoagulant, Xarelto, for 48 hours before any surgical intervention    CKD (chronic kidney disease), stage III  Management per Medicine    SOB (shortness of breath)  Likely as a result of his severe cardiac dysfunction    S/P AVR (aortic valve replacement)  Hold anticoagulation for now, see above for atrial fibrillation    Chronic combined systolic and diastolic congestive heart failure  Patient has significant cardiac disease with an extremely low ejection fraction.  This combined with his valve your disease makes him extremely high risk if not prohibitive risk for surgical intervention.      I would recommend a cardiac consultation to discuss his risk of general anesthetic should it become necessary.         Echo 08/09/2023    Interpretation Summary  · The left ventricle is mildly enlarged with severely decreased systolic function.  · The estimated ejection fraction is 15 - 20%.  · Grade II left ventricular diastolic dysfunction.  · Moderate right ventricular enlargement with severely reduced right ventricular systolic function.  · Severe left atrial enlargement.  · Severe right atrial enlargement.  · There is a 23 mm Freire II porcine bioprosthetic aortic valve present. There is trivial central aortic insufficiency present.  · The aortic valve mean gradient is 6 mmHg with a dimensionless index of 0.27.  · Moderate-to-severe mitral regurgitation.  · Moderate to severe tricuspid regurgitation.  · Moderate pulmonic regurgitation.  · The estimated PA systolic pressure is greater than 54 mmHg.  ·  There is pulmonary hypertension.  .    Hypertension  Management per hospitals Medicine        Andrew Hill MD  General Surgery  [facility]

## 2023-09-04 VITALS
BODY MASS INDEX: 21.9 KG/M2 | WEIGHT: 153 LBS | HEART RATE: 73 BPM | DIASTOLIC BLOOD PRESSURE: 77 MMHG | HEIGHT: 70 IN | TEMPERATURE: 98 F | OXYGEN SATURATION: 98 % | RESPIRATION RATE: 18 BRPM | SYSTOLIC BLOOD PRESSURE: 117 MMHG

## 2023-09-04 LAB
ALBUMIN SERPL BCP-MCNC: 3.5 G/DL (ref 3.5–5.2)
ALP SERPL-CCNC: 99 U/L (ref 55–135)
ALT SERPL W/O P-5'-P-CCNC: 13 U/L (ref 10–44)
ANION GAP SERPL CALC-SCNC: 13 MMOL/L (ref 8–16)
ANISOCYTOSIS BLD QL SMEAR: SLIGHT
AST SERPL-CCNC: 23 U/L (ref 10–40)
BASOPHILS # BLD AUTO: 0.02 K/UL (ref 0–0.2)
BASOPHILS NFR BLD: 0.6 % (ref 0–1.9)
BILIRUB SERPL-MCNC: 1.4 MG/DL (ref 0.1–1)
BUN SERPL-MCNC: 26 MG/DL (ref 8–23)
CALCIUM SERPL-MCNC: 9.5 MG/DL (ref 8.7–10.5)
CHLORIDE SERPL-SCNC: 100 MMOL/L (ref 95–110)
CO2 SERPL-SCNC: 23 MMOL/L (ref 23–29)
CREAT SERPL-MCNC: 1.3 MG/DL (ref 0.5–1.4)
DACRYOCYTES BLD QL SMEAR: ABNORMAL
DIFFERENTIAL METHOD: ABNORMAL
EOSINOPHIL # BLD AUTO: 0.1 K/UL (ref 0–0.5)
EOSINOPHIL NFR BLD: 1.4 % (ref 0–8)
ERYTHROCYTE [DISTWIDTH] IN BLOOD BY AUTOMATED COUNT: 13.5 % (ref 11.5–14.5)
EST. GFR  (NO RACE VARIABLE): 56 ML/MIN/1.73 M^2
GLUCOSE SERPL-MCNC: 79 MG/DL (ref 70–110)
HCT VFR BLD AUTO: 41.7 % (ref 40–54)
HGB BLD-MCNC: 13.8 G/DL (ref 14–18)
HYPOCHROMIA BLD QL SMEAR: ABNORMAL
IMM GRANULOCYTES # BLD AUTO: 0.01 K/UL (ref 0–0.04)
IMM GRANULOCYTES NFR BLD AUTO: 0.3 % (ref 0–0.5)
LYMPHOCYTES # BLD AUTO: 1.3 K/UL (ref 1–4.8)
LYMPHOCYTES NFR BLD: 36.1 % (ref 18–48)
MCH RBC QN AUTO: 31.9 PG (ref 27–31)
MCHC RBC AUTO-ENTMCNC: 33.1 G/DL (ref 32–36)
MCV RBC AUTO: 96 FL (ref 82–98)
MONOCYTES # BLD AUTO: 0.7 K/UL (ref 0.3–1)
MONOCYTES NFR BLD: 19.9 % (ref 4–15)
NEUTROPHILS # BLD AUTO: 1.5 K/UL (ref 1.8–7.7)
NEUTROPHILS NFR BLD: 41.7 % (ref 38–73)
NRBC BLD-RTO: 0 /100 WBC
PLATELET # BLD AUTO: 112 K/UL (ref 150–450)
PLATELET BLD QL SMEAR: ABNORMAL
PMV BLD AUTO: 11.7 FL (ref 9.2–12.9)
POTASSIUM SERPL-SCNC: 4.6 MMOL/L (ref 3.5–5.1)
PROT SERPL-MCNC: 7.6 G/DL (ref 6–8.4)
RBC # BLD AUTO: 4.33 M/UL (ref 4.6–6.2)
SMUDGE CELLS BLD QL SMEAR: PRESENT
SODIUM SERPL-SCNC: 136 MMOL/L (ref 136–145)
WBC # BLD AUTO: 3.57 K/UL (ref 3.9–12.7)

## 2023-09-04 PROCEDURE — 85025 COMPLETE CBC W/AUTO DIFF WBC: CPT | Performed by: NURSE PRACTITIONER

## 2023-09-04 PROCEDURE — 99233 PR SUBSEQUENT HOSPITAL CARE,LEVL III: ICD-10-PCS | Mod: ,,, | Performed by: SURGERY

## 2023-09-04 PROCEDURE — 99233 SBSQ HOSP IP/OBS HIGH 50: CPT | Mod: ,,, | Performed by: SURGERY

## 2023-09-04 PROCEDURE — 36415 COLL VENOUS BLD VENIPUNCTURE: CPT | Performed by: NURSE PRACTITIONER

## 2023-09-04 PROCEDURE — 25000003 PHARM REV CODE 250: Performed by: NURSE PRACTITIONER

## 2023-09-04 PROCEDURE — 80053 COMPREHEN METABOLIC PANEL: CPT | Performed by: NURSE PRACTITIONER

## 2023-09-04 RX ADMIN — CARVEDILOL 12.5 MG: 6.25 TABLET, FILM COATED ORAL at 04:09

## 2023-09-04 RX ADMIN — CARVEDILOL 12.5 MG: 6.25 TABLET, FILM COATED ORAL at 08:09

## 2023-09-04 NOTE — PLAN OF CARE
Problem: Adult Inpatient Plan of Care  Goal: Plan of Care Review  9/4/2023 1603 by Shazia Lackey RN  Outcome: Met  9/4/2023 1534 by Shazia Lackey RN  Outcome: Ongoing, Progressing  Goal: Patient-Specific Goal (Individualized)  9/4/2023 1603 by Shazia Lackey RN  Outcome: Met  9/4/2023 1534 by Shazia Lackey RN  Outcome: Ongoing, Progressing  Goal: Absence of Hospital-Acquired Illness or Injury  9/4/2023 1603 by Shazia Lackey RN  Outcome: Met  9/4/2023 1534 by Shazia Lackey RN  Outcome: Ongoing, Progressing  Goal: Optimal Comfort and Wellbeing  9/4/2023 1603 by Shazia Lackey RN  Outcome: Met  9/4/2023 1534 by Shazia Lackey RN  Outcome: Ongoing, Progressing  Goal: Readiness for Transition of Care  9/4/2023 1603 by Shazia Lackey RN  Outcome: Met  9/4/2023 1534 by Shazia Lackey RN  Outcome: Ongoing, Progressing

## 2023-09-04 NOTE — SUBJECTIVE & OBJECTIVE
Interval History:  Tolerated clear liquids.  Multiple bowel movements.  Stating he does not want surgery.  Check abdominal films and advance diet pending x-rays  If he does not tolerated diet then  small-bowel follow-through with Gastrografin.      Patient would be a high risk for any surgical intervention    Medications:  Continuous Infusions:  Scheduled Meds:   carvediloL  25 mg Oral BID WM     PRN Meds:acetaminophen, dextrose 10%, dextrose 10%, glucagon (human recombinant), glucose, glucose, hydrALAZINE, HYDROcodone-acetaminophen, LORazepam, naloxone, ondansetron, pneumoc 20-mari conj-dip cr(PF), sodium chloride 0.9%     Review of patient's allergies indicates:   Allergen Reactions    Eliquis [apixaban] Swelling    Sacubitril-valsartan Hives and Swelling    Amlodipine Edema     Ankle    Lisinopril Other (See Comments)    Mefloquine Hives and Itching     Anti malarial      Spironolactone Edema     Objective:     Vital Signs (Most Recent):  Temp: 97.9 °F (36.6 °C) (09/04/23 0359)  Pulse: 82 (09/04/23 0359)  Resp: 18 (09/04/23 0359)  BP: (!) 149/98 (09/04/23 0359)  SpO2: 100 % (09/04/23 0359) Vital Signs (24h Range):  Temp:  [97.8 °F (36.6 °C)-98.3 °F (36.8 °C)] 97.9 °F (36.6 °C)  Pulse:  [61-82] 82  Resp:  [17-18] 18  SpO2:  [99 %-100 %] 100 %  BP: (124-149)/(83-98) 149/98     Weight: 69.4 kg (153 lb)  Body mass index is 21.95 kg/m².    Intake/Output - Last 3 Shifts         09/02 0700  09/03 0659 09/03 0700 09/04 0659 09/04 0700 09/05 0659    I.V. (mL/kg) 551.6 (7.9)      Total Intake(mL/kg) 551.6 (7.9)      Net +551.6             Urine Occurrence  2 x     Stool Occurrence  2 x              Physical Exam  Vitals and nursing note reviewed.   Constitutional:       General: He is not in acute distress.     Appearance: He is well-developed. Ill appearance: chronically.   HENT:      Head: Normocephalic and atraumatic.   Eyes:      General: No scleral icterus.     Pupils: Pupils are equal, round, and reactive to light.    Neck:      Thyroid: No thyromegaly.      Vascular: No JVD.      Trachea: No tracheal deviation.   Cardiovascular:      Rate and Rhythm: Normal rate and regular rhythm.      Heart sounds: Normal heart sounds.   Pulmonary:      Effort: Pulmonary effort is normal.      Breath sounds: Normal breath sounds.   Abdominal:      General: Abdomen is flat. Bowel sounds are normal. There is no distension.      Palpations: Abdomen is soft. There is no mass.      Tenderness: There is no abdominal tenderness. There is no guarding or rebound.      Hernia: A hernia (reducible incisional and bilateral inguinal hernias) is present.   Musculoskeletal:         General: Normal range of motion.      Cervical back: Normal range of motion and neck supple.   Lymphadenopathy:      Cervical: No cervical adenopathy.   Skin:     General: Skin is warm and dry.   Neurological:      Mental Status: He is alert and oriented to person, place, and time.   Psychiatric:         Mood and Affect: Mood normal.         Behavior: Behavior normal.         Thought Content: Thought content normal.         Judgment: Judgment normal.          Significant Labs:  I have reviewed all pertinent lab results within the past 24 hours.  CBC:   Recent Labs   Lab 09/04/23  0443   WBC 3.57*   RBC 4.33*   HGB 13.8*   HCT 41.7   *   MCV 96   MCH 31.9*   MCHC 33.1     BMP:   Recent Labs   Lab 09/04/23  0443   GLU 79      K 4.6      CO2 23   BUN 26*   CREATININE 1.3   CALCIUM 9.5       Significant Diagnostics:  I have reviewed all pertinent imaging results/findings within the past 24 hours.  Abdominal films pending

## 2023-09-04 NOTE — PROGRESS NOTES
[facility]  General Surgery  Progress Note    Subjective:     History of Present Illness:  82-year-old male who is past medical history is most significant for ischemic cardiomyopathy with an ejection fraction in the 15% range, who presented to the emergency room with a 1 day history of abdominal pain and a single episode of vomiting.      The patient was evaluated in the emergency room where a CT scan showed dilated loops of small bowel and several hernias.  The oral contrast from the CT scan essentially remained in the stomach.  There was concern for small-bowel obstruction.      The patient had a similar episode of this last August that resolve without operative intervention.  The patient states he is a vegetarian and usually has 3 bowel movements a day.  He is not noticed any changes in his bowel movements.      The patient has a significant cardiac history with a very low ejection fracture and valvular abnormalities that make him a almost prohibitive operative risk    The patient is anticoagulated on Xarelto      Post-Op Info:  * No surgery found *         Interval History:  Tolerated clear liquids.  Multiple bowel movements.  Stating he does not want surgery.  Check abdominal films and advance diet pending x-rays  If he does not tolerated diet then  small-bowel follow-through with Gastrografin.      Patient would be a high risk for any surgical intervention    Medications:  Continuous Infusions:  Scheduled Meds:   carvediloL  25 mg Oral BID WM     PRN Meds:acetaminophen, dextrose 10%, dextrose 10%, glucagon (human recombinant), glucose, glucose, hydrALAZINE, HYDROcodone-acetaminophen, LORazepam, naloxone, ondansetron, pneumoc 20-mari conj-dip cr(PF), sodium chloride 0.9%     Review of patient's allergies indicates:   Allergen Reactions    Eliquis [apixaban] Swelling    Sacubitril-valsartan Hives and Swelling    Amlodipine Edema     Ankle    Lisinopril Other (See Comments)    Mefloquine Hives and  Itching     Anti malarial      Spironolactone Edema     Objective:     Vital Signs (Most Recent):  Temp: 97.9 °F (36.6 °C) (09/04/23 0359)  Pulse: 82 (09/04/23 0359)  Resp: 18 (09/04/23 0359)  BP: (!) 149/98 (09/04/23 0359)  SpO2: 100 % (09/04/23 0359) Vital Signs (24h Range):  Temp:  [97.8 °F (36.6 °C)-98.3 °F (36.8 °C)] 97.9 °F (36.6 °C)  Pulse:  [61-82] 82  Resp:  [17-18] 18  SpO2:  [99 %-100 %] 100 %  BP: (124-149)/(83-98) 149/98     Weight: 69.4 kg (153 lb)  Body mass index is 21.95 kg/m².    Intake/Output - Last 3 Shifts         09/02 0700 09/03 0659 09/03 0700 09/04 0659 09/04 0700  09/05 0659    I.V. (mL/kg) 551.6 (7.9)      Total Intake(mL/kg) 551.6 (7.9)      Net +551.6             Urine Occurrence  2 x     Stool Occurrence  2 x              Physical Exam  Vitals and nursing note reviewed.   Constitutional:       General: He is not in acute distress.     Appearance: He is well-developed. Ill appearance: chronically.   HENT:      Head: Normocephalic and atraumatic.   Eyes:      General: No scleral icterus.     Pupils: Pupils are equal, round, and reactive to light.   Neck:      Thyroid: No thyromegaly.      Vascular: No JVD.      Trachea: No tracheal deviation.   Cardiovascular:      Rate and Rhythm: Normal rate and regular rhythm.      Heart sounds: Normal heart sounds.   Pulmonary:      Effort: Pulmonary effort is normal.      Breath sounds: Normal breath sounds.   Abdominal:      General: Abdomen is flat. Bowel sounds are normal. There is no distension.      Palpations: Abdomen is soft. There is no mass.      Tenderness: There is no abdominal tenderness. There is no guarding or rebound.      Hernia: A hernia (reducible incisional and bilateral inguinal hernias) is present.   Musculoskeletal:         General: Normal range of motion.      Cervical back: Normal range of motion and neck supple.   Lymphadenopathy:      Cervical: No cervical adenopathy.   Skin:     General: Skin is warm and dry.    Neurological:      Mental Status: He is alert and oriented to person, place, and time.   Psychiatric:         Mood and Affect: Mood normal.         Behavior: Behavior normal.         Thought Content: Thought content normal.         Judgment: Judgment normal.          Significant Labs:  I have reviewed all pertinent lab results within the past 24 hours.  CBC:   Recent Labs   Lab 09/04/23 0443   WBC 3.57*   RBC 4.33*   HGB 13.8*   HCT 41.7   *   MCV 96   MCH 31.9*   MCHC 33.1     BMP:   Recent Labs   Lab 09/04/23 0443   GLU 79      K 4.6      CO2 23   BUN 26*   CREATININE 1.3   CALCIUM 9.5       Significant Diagnostics:  I have reviewed all pertinent imaging results/findings within the past 24 hours.  Abdominal films pending    Assessment/Plan:     * SBO (small bowel obstruction)  CT scan is concerning for a bowel obstruction.  He had a similar episode in August.  The patient has not been offered surgical intervention for bowel obstructions in the past or hernia repair due to his nearly prohibitive cardiac risk from his cardiomyopathy.    Small-bowel obstructions likely partial was he is had multiple bowel movements and tolerated clear liquids it is not show any signs of distention.      Check abdominal films and pending the results either advance diet or continue clear liquids with small-bowel follow-through tomorrow.      Patient was stating it is not want surgery however he needs to be able to tolerate at least a liquid diet.      Patient would need a cardiac evaluation before any surgical intervention to clarify his current risk status.  He is definitely high risk but the question would be just help prohibitive the risk is given his severe cardiomyopathy and multiple valvular stenosis.      Surgery would only be recommended if there were no other options    PAF (paroxysmal atrial fibrillation)  Hold anticoagulation for the next 24 hours.  After the short-acting anticoagulation if necessary.       Patient needs to be off his novel anticoagulant, Xarelto, for 48 hours before any surgical intervention    CKD (chronic kidney disease), stage III  Management per Medicine    SOB (shortness of breath)  Likely as a result of his severe cardiac dysfunction    S/P AVR (aortic valve replacement)  Hold anticoagulation for now, see above for atrial fibrillation    Chronic combined systolic and diastolic congestive heart failure  Patient has significant cardiac disease with an extremely low ejection fraction.  This combined with his valve your disease makes him extremely high risk if not prohibitive risk for surgical intervention.      I would recommend a cardiac consultation to discuss his risk of general anesthetic should it become necessary.         Echo 08/09/2023    Interpretation Summary  · The left ventricle is mildly enlarged with severely decreased systolic function.  · The estimated ejection fraction is 15 - 20%.  · Grade II left ventricular diastolic dysfunction.  · Moderate right ventricular enlargement with severely reduced right ventricular systolic function.  · Severe left atrial enlargement.  · Severe right atrial enlargement.  · There is a 23 mm Freire II porcine bioprosthetic aortic valve present. There is trivial central aortic insufficiency present.  · The aortic valve mean gradient is 6 mmHg with a dimensionless index of 0.27.  · Moderate-to-severe mitral regurgitation.  · Moderate to severe tricuspid regurgitation.  · Moderate pulmonic regurgitation.  · The estimated PA systolic pressure is greater than 54 mmHg.  · There is pulmonary hypertension.  .    Hypertension  Management per hospital Medicine        Additional information 09/04/2023.  Abdominal films show a normal bowel gas pattern.      Vegetarian diet.  Okay to have food from home.  If the patient tolerates a diet can be discharged per hospital Medicine.      Follow up with surgery as needed    Andrew Hill MD  General  Surgery  [facility]

## 2023-09-04 NOTE — DISCHARGE SUMMARY
"[facility]  Hospital Medicine  Discharge Summary      Patient Name: Edmund Chu  MRN: [id number]  TALHA: [id number]  Patient Class: IP- Inpatient  Admission Date: 9/2/2023  Hospital Length of Stay: 2 days  Discharge Date and Time:  09/04/2023 4:49 PM  Attending Physician: Evelyne Wadsworth MD   Discharging Provider: Evelyne Wadsworth MD  Primary Care Provider: Srini Scott MD    Primary Care Team: Networked reference to record PCT     HPI:   Mr Chu is a 82 year old male with h/o hypertension, CHF (EF 15-20%), AVR 2016 ([facility]), ICD placement in 2022, h/o colon cancer s/p colectomy in 2009 at [facility] and CKD who presents to the Emergency Department for evaluation of abd pain which onset last night. Pt reports abd pain is centralized at abdominal hernia. He reports three "normal" bowel movements yesterday.  He denies any previous SBO.  He did have one episode of vomiting while in ED.  Work in ED notable for WBC 13, Creatinine 1.5, bili 1.4.  CT ab/pelvis shows Stable distal small-bowel obstruction, Persistent bowel containing inguinal and left periumbilical hernias.  General surgery was consulted, recommended admission under hospital medicine d/t patients multiple co-morbidities and surgery will see in consultation.  Patient will be admitted to hospital medicine for SBO.       Code Status   Surrogate Decision maker       * No surgery found *      Hospital Course:   The patient presented with abdominal pain and vomiting. Workup in the ED was concerning for SBO as dilated loops of small bowel and several hernias noted on CT. General surgery  was consulted. Conservative management pursued. He was placed on bowel rest. Repeat KUB from 9/3 (prior to BM) showed persistent mild small bowel dilation with air-fluid levels suggesting bowel obstruction. As he was having bowel movements, diet was advanced to clear liquid. He tolerated clear liquids. Repeat imaging on 9/4 with normal bowel gas pattern. His " diet was advanced to vegetarian. He tolerated solid food, he was cleared for discharge from surgical standpoint. Patient seen and examined, stable for discharge.        Goals of Care Treatment Preferences:  Code Status: Full Code      Consults:   Consults (From admission, onward)          Status Ordering Provider     Inpatient consult to General Surgery  Once        Provider:  Andrew Hill MD    Completed SOFY WELLS            Final Active Diagnoses:    Diagnosis Date Noted POA    PRINCIPAL PROBLEM:  SBO (small bowel obstruction) [K56.609] 03/09/2023 Yes    PAF (paroxysmal atrial fibrillation) [I48.0] 09/02/2023 Yes    CKD (chronic kidney disease), stage III [N18.30] 03/09/2023 Yes    ICD (implantable cardioverter-defibrillator) in place [Z95.810] 12/12/2022 Yes    Chronic combined systolic and diastolic congestive heart failure [I50.42] 09/26/2022 Yes    Hypertension [I10] 07/27/2022 Yes      Problems Resolved During this Admission:       Discharged Condition: stable    Disposition: Home or Self Care    Follow Up:   Follow-up Information       Srini Scott MD Follow up.    Specialty: Internal Medicine  Contact information:  [address] [place] [id number] [phone]                           Patient Instructions:      Diet Adult Regular     Activity as tolerated       Significant Diagnostic Studies: Radiology:   Imaging Results              CT Abdomen Pelvis With Contrast (Final result)  Result time 09/02/23 10:48:46      Final result by Domo Frias III, MD (09/02/23 10:48:46)                   Impression:      Stable distal small-bowel obstruction.  No oral contrast extends distal to the stomach and proximal duodenum again limiting evaluation of the transition point.  Abdominal x-ray follow up to evaluate transit of oral contrast may be of benefit.  Possible transition points at an anastomotic stricture in the right lower quadrant versus the periumbilical hernia or right inguinal  hernia.  Left inguinal hernia which does not appear to represent the site of obstruction. No other acute findings.    All CT scans at this facility are performed  using dose modulation techniques as appropriate to performed exam including the following:  automated exposure control; adjustment of mA and/or kV according to the patients size (this includes techniques or standardized protocols for targeted exams where dose is matched to indication/reason for exam: i.e. extremities or head);  iterative reconstruction technique.      Electronically signed by: Domo Frias MD  Date:    09/02/2023  Time:    10:48               Narrative:    EXAMINATION:  CT ABDOMEN PELVIS WITH CONTRAST    CLINICAL HISTORY:  Bowel obstruction suspected; abdominal pain    COMPARISON:  Noncontrast abdominal CT from 2 hours ago.    FINDINGS:  Lung bases: No acute findings.    Bones: No acute osseous abnormality or suspicious bone lesions.    Stomach and bowel: No oral contrast extends distal to the stomach and proximal duodenum again limiting evaluation of the transition point.  Stable left periumbilical hernia containing a short segment of dilated small bowel. Stable left inguinal hernia containing a short segment of nondilated sigmoid colon.  Stable right inguinal hernia containing a nondilated segment of the cecum, sigmoid colon and possibly the terminal ileum which appears compressed.  Stable diffuse small bowel dilation with air-fluid levels. The transition point is again not well evaluated. Partial small bowel resection and anastomosis in the right lower quadrant.  There are a few segments of collapsed segments of small bowel in the right mid and lower quadrant as well as just posterior to the periumbilical hernia and right inguinal hernia..  No colonic dilation is present.  No acute inflammatory changes identified..    Appendix: No evidence of appendicitis.    Liver: Unremarkable.    Gallbladder and bile ducts:  Unremarkable.    Pancreas: Unremarkable.    Spleen: Unremarkable.    Adrenals: Unremarkable.    Bladder: Unremarkable.    Aorta: No aneurysm.    Reproductive organs: Within normal limits.    Miscellaneous: No free intraperitoneal fluid, free air or abscess identified. No pathologic lymphadenopathy.                                       CT Abdomen Pelvis  Without Contrast (Final result)  Result time 09/02/23 08:48:04      Final result by Domo Frias III, MD (09/02/23 08:48:04)                   Impression:      Persistent distal small-bowel obstruction with a possible transition point at an anastomotic stricture in the right lower quadrant however evaluation is limited without oral contrast.  Persistent bowel containing inguinal and left periumbilical hernias which do not appear to represent the site of obstruction.  No other acute findings.    Note: All CT scans at this facility are performed  using dose modulation techniques as appropriate to performed exam including the following:  automated exposure control; adjustment of mA and/or kV according to the patients size (this includes techniques or standardized protocols for targeted exams where dose is matched to indication/reason for exam: i.e. extremities or head);  iterative reconstruction technique.      Electronically signed by: Domo Frias MD  Date:    09/02/2023  Time:    08:48               Narrative:    EXAMINATION:  CT ABDOMEN PELVIS WITHOUT CONTRAST    CLINICAL HISTORY:  Abdominal abscess/infection suspected;    TECHNIQUE:  Routine CT abdomen and pelvis performed without IV contrast.  Coronal and sagittal reformatted images obtained.    COMPARISON:  March 9, 2023    FINDINGS:  Lung bases: No acute findings.    Bones: No acute osseous abnormality or suspicious bone lesions.    Kidneys and ureters: No evidence of urolithiasis, hydronephrosis or other acute findings.    Stomach and bowel: Stable left periumbilical hernia containing a short segment of  dilated small bowel.  Stable left inguinal hernia containing a short segment of nondilated sigmoid colon.  Enlarging right inguinal hernia containing a nondilated segment of the cecum and sigmoid colon.  Stable diffuse small bowel dilation with air-fluid levels.  The transition point is not well evaluated without oral contrast.  Partial small bowel resection and anastomosis in the right lower quadrant with collapse of the more distal small bowel, possibly representing anastomotic stricture and site of obstruction.  No colonic dilation is present.  No acute inflammatory changes identified..    Appendix: No evidence of appendicitis.    Liver: Unremarkable for noncontrast technique.    Gallbladder and bile ducts: Unremarkable.    Pancreas: Unremarkable for noncontrast technique.    Spleen: Unremarkable for noncontrast technique.    Adrenals: Unremarkable.    Bladder: Unremarkable.    Aorta: No aneurysm.    Reproductive organs: Within normal limits.    Miscellaneous: No free intraperitoneal fluid, free air or abscess identified. No pathologic lymphadenopathy.                                        Pending Diagnostic Studies:       None           Medications:  Reconciled Home Medications:      Medication List        CONTINUE taking these medications      carvediloL 12.5 MG tablet  Commonly known as: COREG  Take 25 mg by mouth 2 (two) times daily with meals.     coenzyme Q10 100 mg capsule  Take 100 mg by mouth once daily.     dapagliflozin propanediol 10 mg tablet  Commonly known as: FARXIGA  Take 10 mg by mouth once daily.     LORazepam 0.5 MG tablet  Commonly known as: ATIVAN  Take 1 mg by mouth 2 (two) times daily.     mometasone 0.1% 0.1 % cream  Commonly known as: ELOCON  As needed cream     olmesartan 40 MG tablet  Commonly known as: BENICAR  Take 20 mg by mouth once daily.     rivaroxaban 10 mg Tab  Commonly known as: XARELTO  Take 2 tablets (20 mg total) by mouth daily with dinner or evening meal.     torsemide  20 MG Tab  Commonly known as: DEMADEX  Take 30 mg by mouth once daily.     tumeric-ging-olive-oreg-capryl 100 mg-150 mg- 50 mg-150 mg Cap  Take 1 capsule by mouth once daily.              Indwelling Lines/Drains at time of discharge:   Lines/Drains/Airways       None                   Time spent on the discharge of patient: 31 minutes         Evelyne Wadsworth MD  Department of Hospital Medicine  [facility]

## 2023-09-04 NOTE — NURSING
Reviewed AVS with patient. IV removed. Cath intact. Cardiac monitor removed, cleaned and replaced back in monitor room.

## 2023-09-04 NOTE — PLAN OF CARE
[facility]  Initial Discharge Assessment       Primary Care Provider: Srini Scott MD    Admission Diagnosis: SBO (small bowel obstruction) [K56.609]  Chest pain [R07.9]    Admission Date: 9/2/2023  Expected Discharge Date:     Transition of Care Barriers: None    Payor: MEDICARE / Plan: MEDICARE PART A & B / Product Type: Government /     Extended Emergency Contact Information  Primary Emergency Contact: Daniel Chu  Mobile Phone: [phone]  Relation: Daughter  Secondary Emergency Contact: Rashi Chu  Mobile Phone: [phone]  Relation: Son    Discharge Plan A: Home         [id number] - [place], [place] - [address] AT [facility] [address]  [address]  [place] [address]  Phone: [phone] Fax: [phone]      Initial Assessment (most recent)       Adult Discharge Assessment - 09/04/23 1134          Discharge Assessment    Assessment Type Discharge Planning Assessment     Confirmed/corrected address, phone number and insurance Yes     Confirmed Demographics Correct on Facesheet     Source of Information patient     Does patient/caregiver understand observation status Yes     Communicated ONEIDA with patient/caregiver Date not available/Unable to determine     People in Home spouse     Do you expect to return to your current living situation? Yes     Do you have help at home or someone to help you manage your care at home? No     Prior to hospitilization cognitive status: Alert/Oriented     Current cognitive status: Alert/Oriented     Equipment Currently Used at Home none     Readmission within 30 days? No     Patient currently being followed by outpatient case management? No     Do you currently have service(s) that help you manage your care at home? No     Do you take prescription medications? Yes     Do you have prescription coverage? Yes     Do you have any problems affording any of your prescribed medications? No     Is the patient taking medications as prescribed?  yes     Who is going to help you get home at discharge? family     How do you get to doctors appointments? car, drives self     Are you on dialysis? No     Do you take coumadin? No     DME Needed Upon Discharge  none     Discharge Plan discussed with: Patient     Transition of Care Barriers None     Discharge Plan A Home

## 2023-09-06 ENCOUNTER — OFFICE VISIT (OUTPATIENT)
Dept: CARDIOLOGY | Facility: CLINIC | Age: 83
End: 2023-09-06
Payer: MEDICARE

## 2023-09-06 VITALS
HEIGHT: 70 IN | HEART RATE: 72 BPM | BODY MASS INDEX: 22.6 KG/M2 | WEIGHT: 157.88 LBS | SYSTOLIC BLOOD PRESSURE: 120 MMHG | OXYGEN SATURATION: 98 % | DIASTOLIC BLOOD PRESSURE: 82 MMHG

## 2023-09-06 DIAGNOSIS — I27.20 PULMONARY HYPERTENSION: ICD-10-CM

## 2023-09-06 DIAGNOSIS — I45.10 RBBB: ICD-10-CM

## 2023-09-06 DIAGNOSIS — Z95.810 ICD (IMPLANTABLE CARDIOVERTER-DEFIBRILLATOR) IN PLACE: ICD-10-CM

## 2023-09-06 DIAGNOSIS — I10 PRIMARY HYPERTENSION: ICD-10-CM

## 2023-09-06 DIAGNOSIS — I48.3 TYPICAL ATRIAL FLUTTER: ICD-10-CM

## 2023-09-06 DIAGNOSIS — Z95.2 S/P AVR (AORTIC VALVE REPLACEMENT): Primary | ICD-10-CM

## 2023-09-06 DIAGNOSIS — I48.0 PAF (PAROXYSMAL ATRIAL FIBRILLATION): ICD-10-CM

## 2023-09-06 DIAGNOSIS — I50.42 CHRONIC COMBINED SYSTOLIC AND DIASTOLIC CONGESTIVE HEART FAILURE: ICD-10-CM

## 2023-09-06 DIAGNOSIS — M25.519 SHOULDER PAIN, UNSPECIFIED CHRONICITY, UNSPECIFIED LATERALITY: ICD-10-CM

## 2023-09-06 DIAGNOSIS — R06.02 SOB (SHORTNESS OF BREATH): ICD-10-CM

## 2023-09-06 DIAGNOSIS — I49.3 PVC'S (PREMATURE VENTRICULAR CONTRACTIONS): ICD-10-CM

## 2023-09-06 PROCEDURE — 99214 OFFICE O/P EST MOD 30 MIN: CPT | Mod: S$PBB,,, | Performed by: STUDENT IN AN ORGANIZED HEALTH CARE EDUCATION/TRAINING PROGRAM

## 2023-09-06 PROCEDURE — 99999 PR PBB SHADOW E&M-EST. PATIENT-LVL III: CPT | Mod: PBBFAC,,, | Performed by: STUDENT IN AN ORGANIZED HEALTH CARE EDUCATION/TRAINING PROGRAM

## 2023-09-06 PROCEDURE — 99999 PR PBB SHADOW E&M-EST. PATIENT-LVL III: ICD-10-PCS | Mod: PBBFAC,,, | Performed by: STUDENT IN AN ORGANIZED HEALTH CARE EDUCATION/TRAINING PROGRAM

## 2023-09-06 PROCEDURE — 99213 OFFICE O/P EST LOW 20 MIN: CPT | Mod: PBBFAC | Performed by: STUDENT IN AN ORGANIZED HEALTH CARE EDUCATION/TRAINING PROGRAM

## 2023-09-06 PROCEDURE — 99214 PR OFFICE/OUTPT VISIT, EST, LEVL IV, 30-39 MIN: ICD-10-PCS | Mod: S$PBB,,, | Performed by: STUDENT IN AN ORGANIZED HEALTH CARE EDUCATION/TRAINING PROGRAM

## 2023-09-06 NOTE — PROGRESS NOTES
Section of Cardiology                  Cardiac Clinic Note    Chief Complaint/Reason for consultation:  Establish care      HPI:   Edmund Chu is a 82 y.o. male with h/o hypertension, CHF, AVR 2016 ([facility]), h/o colon cancerwho comes in to Cardiology Clinic to establish care.        7/27/2022  Per Care everywhere, last echocardiogram March 2021 with redo use LV function, right heart failure, moderate mitral regurgitation, normal functioning bioprosthetic aortic valve    Comes in with wife   Only coming in for ICD evaluation   Still seeing Dr. Viveros  Has had CHF since 2009- did not want ICD until now    Aortic valve replacement, no aspirin due to mild bruising     Reports heart issues since 2009- had colon cancer s/p resection     Walks regularly, 1.2 miles daily, worsening SOB  Recent orthopnea last few days  Reports leg swelling- recently needed to take torsemide which he doesn't take it regularly   He's a vegetarian, does not use salt  Trying to watch fluid intake     Family hx: cousin: ICD, afib   Stopped smoking at age of 30 yo. ETOH occ    8/31/22  BNP level was elevated, torsemide 100 mg daily increased to 200 mg daily at the time  Repeat BNP level was elevated, CMP showed stable renal function  Patient now back down to 100 mg   Dropped 6lbs since last visit  Reports daily weights, fluid and salt restriction  Has not taken Xarelto in fear of bleeding if he cuts himself  Blood pressure readings normotensive at home ranges a 110 to 135 systolic    Denies chest pain, shortness of breath, dizziness, palpitations, orthopnea, PND      10/7/22  Saw Dr. Wong, will be having ICD placement- but patient wants to wait  Reports rash/itching, unsure of which medication is causing it  Has seen dermatology  Switched torsemide to lasix due to itching, however, itching has not improved  BP elevated  Echo pending     Denies chest pain, shortness of breath, dizziness, palpitations, orthopnea,  PND      11/8/22  ICD implanted on 10/27/22  Reports cough and SOB, sputum production  Denies LE swelling, PND  Recently started back on diuretics, torsemide 20  No longer taking farxiga, spironolactone   Believes itching was due to wine, has since stopped   Did not see allergy/imm      11/22/22  Virtual visit   Concerned about his labs  Chest x-ray showing cardiac enlargement, vascular congestion, no pneumonia or pneumothorax  Labs show heart failure in congestion, BNP significantly elevated   Continues to have cough with sputum production  Reports shortness of breath with ambulation  Patient to take 2 tablets of torsemide   Will repeat labs in 1 week      12/12/22  Comes in with wife and daughter   Has h/o insomnia, but believes it's worsened after the ICD  Decided to switch from torsemide back to Lasix:  Thought torsemide was causing knee pain.  But switched back to torsemide 1.5 tablets daily  Recent BNP elevated 3,945   Repeat BNP pending today  Does not wear CPAP machine regularly  Still believes he has allergies to some medications        1/9/23  Still having issues sleeping  Taking medications for sleep  Feels better when he gets good sleep   Echo with EF 15-20%, mod-severe TR/MR (worsening from prior)  Taking 1.5 torsemide daily   SOB has improved  Restarted farxiga         4/17/23  Stopped some meds due to side effects  SOB much improved  Takes torsemide daily   Does not want to take xarelto   Walks 1.2 miles daily   Watches salt and fluid intake, weight self daily   He's vegetarian  7 lbs up since 3/23- has been eating more recently   Taking ativan for sleep       7/31/23  Reports for the last week, has been having shortness of breath  Has been eating pizza more often than normal  Has exertional shortness of breath and PND/orthopnea   Denies chest pain   Taking torsemide 1 tablet and should be taking 1.5 tablets  Watching fluid intake   Reports weight has increased a bit at home  144 lbs in 3/23->157 lbs  today        9/6/23  Comes in with daughter  Taking torsemide 1 tablet  Was admitted recently for SBO, patient thinks it was due to Xarelto  Stopped taking the xarelto 9/1/23  EKG prior to BR[facility]NN/cardioversion 8/9/2023 showed atrial flutter  Had BROOKLYNN/cardioversion, cardioverted to sinus tach  Denies shortness of breath, restarted walking  Recently seen Dr. Grider and requests to switch providers      EKG 8/9/2023 atrial flutter  EKG 3/9/23 SB, RBBB, inferior infarct, TWI lateral leads   EKG 8/31/2022 NSR, RBBB, LAFB, PVCs,  ms   EKG 7/27/2022 NSR, RBBB, LAFB, inferior infarct, qtc 529 ms          Prior records  Stress test 12/2020 small reversible defect, apical segment.  Medium perfusion defect inferior wall  LHC 2/21:  Left main 20% stenosis   Lad:  Mid LAD 50% stenosis   Left circumflex: Diffuse irregularity  Mid RCA proximal portion 50% stenosis  Distal RCA 50% stenosis   Ramus diffuse irregularity  Echo 11/21 EF 30%, RV function is severely decreased          Echo 12/19/22  The left ventricle is mildly enlarged with severely decreased systolic function.  The estimated ejection fraction is 15 - 20%.  Grade II left ventricular diastolic dysfunction.  Moderate right ventricular enlargement with severely reduced right ventricular systolic function.  Severe left atrial enlargement.  Severe right atrial enlargement.  There is a 23 mm Freire II porcine bioprosthetic aortic valve present. There is trivial central aortic insufficiency present.  The aortic valve mean gradient is 6 mmHg with a dimensionless index of 0.27.  Moderate-to-severe mitral regurgitation.  Moderate to severe tricuspid regurgitation.  Moderate pulmonic regurgitation.  The estimated PA systolic pressure is greater than 54 mmHg.  There is pulmonary hypertension.    Echo 10/22  Concentric hypertrophy and severely decreased systolic function.  Severe left atrial enlargement.  The estimated ejection fraction is 20%.  Left ventricular diastolic  dysfunction.  Normal right ventricular size with normal right ventricular systolic function.  Mild tricuspid regurgitation.      ECHO  3/21 (Care everywhere)  CONCLUSIONS:   - Exam indication: Initial evaluation of known cardiomyopathy   - The left ventricle is mildly dilated. There is left ventricular hypertrophy.   Left ventricular systolic function is severely decreased. EF = 26 ± 5% (2D 4-ch.)   Definity contrast used for endocardial border detection.   - The right ventricle is dilated. Right ventricular systolic function is   moderately decreased.   - The left atrial cavity is severely dilated.   - The right atrial cavity is dilated.   - There is moderate (2+ - 3+) mitral valve regurgitation due to restricted leaflet    motion and apical tethering of normal mitral leaflet caused by LV enlargement.   Regurgitant orifice area (PISA) is 0.24 cm².   - Porcine prosthetic aortic valve. There is trace aortic valve regurgitation. The   peak gradient is 10 mmHg, the mean gradient is 5 mmHg and the dimensionless valve   index is 0.31.   - Estimated right ventricular systolic pressure is 42 mmHg plus right atrial   pressure. Estimated right atrial pressure is not included as the IVC was not seen.   - The patient has not had a prior CC echocardiographic exam for comparison.       STRESS TEST    [facility]      ROS: All 10 systems reviewed. Please refer to the HPI for pertinent positives. All other systems negative.     Past Medical History  Past Medical History:   Diagnosis Date    Aortic valve prosthesis present     CHF (congestive heart failure)     CKD (chronic kidney disease) stage 3, GFR 30-59 ml/min     Colon cancer     Hypertension     PAF (paroxysmal atrial fibrillation)        Surgical History  Past Surgical History:   Procedure Laterality Date    AORTIC VALVE REPLACEMENT  2016    COLON RESECTION      TRANSESOPHAGEAL ECHOCARDIOGRAM WITH POSSIBLE CARDIOVERSION (BR[facility] W/ POSS CARDIOVERSION) N/A 8/9/2023    Procedure:  "Transesophageal echo (BR[facility]) intra-procedure log documentation;  Surgeon: Tere Johns MD;  Location: [facility] CATH LAB;  Service: Cardiology;  Laterality: N/A;          Allergies:   Review of patient's allergies indicates:   Allergen Reactions    Eliquis [apixaban] Swelling    Sacubitril-valsartan Hives and Swelling    Amlodipine Edema     Ankle    Lisinopril Other (See Comments)    Mefloquine Hives and Itching     Anti malarial      Spironolactone Edema       Social History:  Social History     Socioeconomic History    Marital status:    Tobacco Use    Smoking status: Former    Smokeless tobacco: Never   Substance and Sexual Activity    Alcohol use: Yes    Drug use: Never       Family History:  family history includes Colon cancer in his father; Hypertension in his mother.    Home Medications:  Current Outpatient Medications on File Prior to Visit   Medication Sig Dispense Refill    carvediloL (COREG) 12.5 MG tablet Take 25 mg by mouth 2 (two) times daily with meals.      coenzyme Q10 100 mg capsule Take 100 mg by mouth once daily.      dapagliflozin (FARXIGA) 10 mg tablet Take 10 mg by mouth once daily.      LORazepam (ATIVAN) 0.5 MG tablet Take 1 mg by mouth 2 (two) times daily.      mometasone 0.1% (ELOCON) 0.1 % cream As needed cream      olmesartan (BENICAR) 40 MG tablet Take 20 mg by mouth once daily.      torsemide (DEMADEX) 20 MG Tab Take 30 mg by mouth once daily.      tumeric-ging-olive-oreg-capryl 100 mg-150 mg- 50 mg-150 mg Cap Take 1 capsule by mouth once daily.      rivaroxaban (XARELTO) 10 mg Tab Take 2 tablets (20 mg total) by mouth daily with dinner or evening meal. (Patient not taking: Reported on 9/6/2023) 30 tablet 6     No current facility-administered medications on file prior to visit.       Physical exam:  /82 (BP Location: Right arm, Patient Position: Sitting, BP Method: Medium (Manual))   Pulse 72   Ht 5' 10" (1.778 m)   Wt 71.6 kg (157 lb 13.6 oz)   SpO2 98%   BMI " "22.65 kg/m²         General: Pt is a 82 y.o. year old male who is AAOx3, in NAD, is pleasant, well nourished, looks stated age  HEENT: PERRL, EOMI, Oral mucosa pink & moist  CVS  No abnormal cardiac pulsations noted on inspection. The apical impulse is normal on palpation, and is located in the left 5th intercostal space in the mid - clavicular line. No palpable thrills or abnormal pulsations noted. RR, S1 - S2 heard, no murmurs, rubs or gallops appreciated.   PUL :  NO wheezes, rales, crackles   ABD : BS +, soft. No tenderness elicited   LE : No C/C/E. Distal Pulses palpable B/L           LABS:    Chemistry:   Lab Results   Component Value Date     09/04/2023    K 4.6 09/04/2023     09/04/2023    CO2 23 09/04/2023    BUN 26 (H) 09/04/2023    CREATININE 1.3 09/04/2023    CALCIUM 9.5 09/04/2023     Cardiac Markers: No results found for: "CKTOTAL", "CKMB", "CKMBINDEX", "TROPONINI"  Cardiac Markers (Last 3): No results found for: "CKTOTAL", "CKMB", "CKMBINDEX", "TROPONINI"  CBC:   Lab Results   Component Value Date    WBC 3.57 (L) 09/04/2023    HGB 13.8 (L) 09/04/2023    HCT 41.7 09/04/2023    MCV 96 09/04/2023     (L) 09/04/2023     Lipids: No results found for: "CHOL", "TRIG", "HDL", "LDLDIRECT"  Coagulation:   Lab Results   Component Value Date    INR 1.3 (H) 09/03/2023    APTT 31.8 09/03/2023           Assessment      1. S/P AVR (aortic valve replacement)    2. ICD (implantable cardioverter-defibrillator) in place    3. Chronic combined systolic and diastolic congestive heart failure    4. Primary hypertension    5. Pulmonary hypertension    6. PVC's (premature ventricular contractions)    7. RBBB    8. PAF (paroxysmal atrial fibrillation)    9. SOB (shortness of breath)    10. Typical atrial flutter    11. Shoulder pain, unspecified chronicity, unspecified laterality          Plan:    Shortness of breath/cough - resolved   Continue torsemide 1 tablet mg daily (increase to 1.5 tablets if symptoms " worsen)    Congestive heart failure s/p ICD  NYHA class III, stage C  Systolic heart failure with right ventricular failure  Continue carvedilol, Benicar  Stopped Farxiga, spironolactone due side effects   Stopped entresto - due to swelling (has allergy)  Taking Torsemide 20 mg     ICD implanted  F/u with device clinic   Will have patient see Dr. Hoang yearly    Aortic valve replacement  Echo 12/22 EF 15-30%, valve looks to be functioning properly, severely reduced RV function, mod-severe MR/TR, mod PI, biatrial enlargement     PAF/aflutter  Stopped eliquis, Xarelto due side effects  Will not refer to EP for ablation if does not want to take blood thinners  Continue coreg     Hypertension  Stable   Continue above regimen     Shoulder pain  Reports shoulder pain since ICD was placed on last year   Will refer to physical therapy    This note was prepared using voice recognition system and is likely to have sound alike errors that may have been overlooked even after proofreading.     I have reviewed all pertinent chart information.  Plans and recommendations have been formulated under my direct supervision. All questions answered and patient voiced understanding.   If symptoms persist go to the ED.    RTC in 3 months        Tere Johns MD  Cardiology

## 2023-09-17 NOTE — PROGRESS NOTES
Subjective:   Patient ID:  Edmund Chu is a 82 y.o. male     Chief complaint:No chief complaint on file.      HPI  New patient to me as of 09/26/2022.  Referred by Dr Johns for evaluation and management of CHF/ICD/PAF   --   Background as gleaned from patient's records and today's interview :  81 y.o. male with h/o hypertension, CHF, AVR 2016 ([facility]), h/o colon cancer   Has had CHF since 2009- did not want ICD until now   Aortic valve replacement, no aspirin due to mild bruising   Echo from [facility] in jan 2022:  Final Impressions   1. Moderately enlarged left ventricular chamber size, moderate generalized hypokinesis,   calculated 2-D biplane volumetric ejection fraction 29%.   2. Abnormal left ventricular geometry with  eccentric left ventricular hypertrophy.   3. Moderately enlarged right ventricular chamber size, moderate-severely reduced systolic   function, estimated right ventricular systolic pressure 58 mmHg (right atrial pressure of 15   mmHg).   4. Status post 23 mm Freire II porcine aortic valve prosthesis. Mean gradient; 12 mmHg.   Trivial prosthetic and periprosthetic regurgitation.   5. Mild-moderate tricuspid valve regurgitation.   6. Normal mid ascending aorta diameter (diameter 32 mm at mid level).   7. Mildly enlarged inferior vena cava size with reduced inspiratory collapse (<50%).   8. Compared to the report of 05/16/2015 no significant change has occurred.     [facility] 2/21:  Left main 20% stenosis   Lad:  Mid LAD 50% stenosis   Left circumflex: Diffuse irregularity  Mid RCA proximal portion 50% stenosis  Distal RCA 50% stenosis   Ramus diffuse irregularity     PFTs:  3/7/2017  5:20 PM   Spirometry is abnormal. The forced vital capacity is mildly   reduced. The flows are abnormal. After bronchodilator there was   significant improvement in the FEV1. The total lung capacity is   mildly reduced. Diffusion is normal.Mixed obstructive restrictive   impairment of a mild to moderate  degree. There is reversibility       Latest Reference Range & Units 07/27/22 10:04 08/05/22 12:14 08/17/22 12:33   BNP 0 - 99 pg/mL 2,819 (H) 1,369 (H) 1,441 (H)        Latest Reference Range & Units 08/31/22 09:39   Sodium 136 - 145 mmol/L 137   Potassium 3.5 - 5.1 mmol/L 4.5   Chloride 95 - 110 mmol/L 99   CO2 23 - 29 mmol/L 27   Anion Gap 8 - 16 mmol/L 11   BUN 8 - 23 mg/dL 34 (H)   Creatinine 0.5 - 1.4 mg/dL 1.6 (H)   eGFR >60 mL/min/1.73 m^2 43.6 !   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  His symptoms of dyspnea and orthopnea have somewhat abated.    He tried Entresto and he says that he was allergic to it (itching around the eyes). However, he is on Benicar - he does have a rash on his back.   His BPs at home were rather soft in jan  Last week 102/61 to 136/81.      Has had a DCCV in 3/2021  Has been with worse CHF Sx since 10/2021.   >>   He is a good candidate in theory for ICD implantation.  I am not sure how decreased his is functionality.  He seems to me to be closer to class 3 than class 2.  Still, there may be a statistical benefit the having an ICD implant.  Also, he is probably not on maximum tolerable medical therapy.  Will try to advance this.   >>  Add tissue Doppler indices to echocardiogram.  Review and decide whether CRT may be helpful.  If he has significant dyssynchrony, we may consider CRT.  If not, we may consider other heart failure devices such as Barostim or CCM.  6 minute walk now  Chest x-ray PA and lateral.  Start SGLT2 inhibitor at 10 mg per day.  Obtain BMP in 1 week.  Patient has the rash on his back and may be drug related.  Will refer to dermatology for evaluation.    Patient is supposed to have an allergic  reaction to ARB.  Will refer to allergy because we are interested in starting Entresto if at all possible.     I have discussed the ICD implant procedure in detail with the patient. I described its benefits and risks. I reviewed alternative therapies and discussed their potential value. The patient was given ample opportunity to express concerns and ask questions and I provided appropriate responses and  answers to such.The patient understands and agrees to proceed.  Consent form was signed today by patient and myself and appropriately witnessed.      Update 12/12/2022:  Had an ICD implant on 10/27/2022  Bio Acticor 7 VRT-Dx 811736, [id number], PID: [id number]   ICD lead: Bio Plexa Pro MRI S-Dx 65/17, [id number]   Sub-pectoral placement     This was evaluated today and is in excellent repair.     Since then, he has taken himself off of his CHF medications due to reasons of his own.  He has also manipulated the his diuretic doses and switched back and forth between furosemide and torsemide.     He is complaining of more NAVARRO, orthopnea (equivalent to 5 pillows) and leg edema.     He is here today with his wife and daughter.        Update 03/05/2023 :  He had the VDx implant in January.  ICD and lead implanted 10/27/22, [employer]   Bio Acticor 7 VRT-Dx 731623, [id number], PID: [id number]   ICD lead: Bio Plexa Pro MRI S-Dx 65/17, [id number]   1/30/23-underlying SB @ 56, intact conduction @ 200ms, , RBBB   Sub-pectoral placement  After being initially self medicating, he has endorsed the plans that we have set for him and is following instructions.    He came back from his trip and attempted restarting Entresto but had the swelling of his hands as a result.  He stopped it and the swelling has decreased.  He seems to be truly allergic to sacubitril.  ICD evaluation reveals no significant arrhythmias and excellent parameters with normal function.  I have reviewed the actual image of the ECG tracing obtained today and it shows NSR with RBBB,  occasional PVCs.  He now walks 0.8 to 1.2 miles at a time.  >>  He is allergic to sacubitril.  Will keep him on ARB only.     Update 05/03/2023 :  Stopped some meds due to side effects-specifically, Farxiga was discontinued due to genital itching.  SOB much improved  Takes torsemide daily   He says that in general, he feels well.  He now is comfortable with the idea that he has an ICD in fact is happy decided to have it implanted.  He also has stopped Aldactone.  Not sure whether there is attributable side effects to it but he complained of muscle pains and thought that this would be a result of the medication.    Update 08/25/23:  He came in on 08/07/2023 and had a device check.  The report was as follows:    MRI safe  ICD and lead implanted 10/27/22, [employer]  Bio Acticor 7 VRT-Dx [id number], [id number], PID: [id number]  ICD lead: Bio Plexa Pro MRI S-Dx 65/17, [id number]  VDI 40     Incision: Chronic left upper chest, C/D/I, no signs of pocket erosion.     Presenting egram demonstrates: AsVs     Underlying rhythm c/w: AF w/V rate 70-110s     RA pacing n/a%, RV pacing 1%, PVCs 6%     Battery Status/Longevity: 3.11V, AURA     Atrial arrhythmias: 134, burden 5.5%, ongoing AF since 7/28/23, V rate 80/100s.       Ventricular arrhythmias: none     Anticoagulant: Eliquis     CHF: Th impedance 64 ohms (range 54-66 ohms).  Pt has chronic combined systolic and diastolic congestive heart failure.     Reprogramming at this visit: Increased atrial sensitivity from 0.4 --> 0.2mV.       Nurse note:   Off-schedule check, atrial undersensing during AF.  Pt symptomatic with AF - seen by Dr. Johns on 7/31/23.  He was started on Eliquis 5mg twice daily but has been taking Eliquis 2.5mg twice daily.    Reviewed with SARINA Marti NP - pt is to take Eliquis 5mg twice daily starting today.  Continue Torsemide 20mg 1.5 tablets daily as directed.  Needs to have BROOKLYNN/DCCV this week if possible.  Pt was scheduled for cardioversion on 8/9/23 with Dr. Johns.    He  is here today telling me that 1 of his hands was swollen as a result of an allergy to Eliquis and was combatively challenging me as to why I did not recognize that as an allergy to to the drug.  Telling me that I did not care to even come and see him when he came in that they open (even though I was not on campus and he came in as an unscheduled visit).  He then proceeded to tell me that he could not understand why it took someone with less experience (Dr. Johns) to recognize that this was an allergy to Eliquis and that she switched him over to Xarelto.    On 08/09, he underwent the BROOKLYNN/cardioversion.  We have interrogated his ICD today and reviewed his diagnostics.  He has been in sinus rhythm since then.  He continues to feel okay.       Current Outpatient Medications   Medication Sig    carvediloL (COREG) 12.5 MG tablet Take 25 mg by mouth 2 (two) times daily with meals.    coenzyme Q10 100 mg capsule Take 100 mg by mouth once daily.    LORazepam (ATIVAN) 0.5 MG tablet Take 1 mg by mouth 2 (two) times daily.    mometasone 0.1% (ELOCON) 0.1 % cream As needed cream    olmesartan (BENICAR) 40 MG tablet Take 20 mg by mouth once daily.    rivaroxaban (XARELTO) 10 mg Tab Take 2 tablets (20 mg total) by mouth daily with dinner or evening meal. (Patient not taking: Reported on 9/6/2023)    torsemide (DEMADEX) 20 MG Tab Take 30 mg by mouth once daily.    tumeric-ging-olive-oreg-capryl 100 mg-150 mg- 50 mg-150 mg Cap Take 1 capsule by mouth once daily.    dapagliflozin (FARXIGA) 10 mg tablet Take 10 mg by mouth once daily.     No current facility-administered medications for this visit.       Review of Systems     Constitutional: Reviewed  for decreased appetite, weight gain and weight loss.   HENT: Reviewed for nosebleeds.    Eyes:  Reviewed for blurred vision and visual disturbance.   Cardiovascular: Reviewed for chest pain, claudication, cyanosis,dyspnea on exertion, leg swelling, orthopnea,paroxysmal nocturnal  dyspnearregular heartbeats, palpitations, near-syncope, and syncope.   Respiratory: Reviewed for cough, shortness of breath, wheezing, sleep disturbances due to breathing and snoring, .    Endocrine: Reviewed for heat intolerance.   Hematologic/Lymphatic: Reviewed for easy bruisability/bleeding.   Skin: Reviewed for rash.   Musculoskeletal: Reviewed for muscle weakness and myalgias.   Gastrointestinal: Reviewed for abdominal pain, anorexia, melena, nausea and vomiting.   Genitourinary: Reviewed for hesitancy, frequency, nocturia and incontinence.   Neurological: Reviewed for excessive daytime sleepiness, dizziness, vertigo, weakness, headaches, loss of balance and seizures,   Psychiatric/Behavioral:  Reviewed for insomnia, altered mental status, depression, anxiety and nervousness.       All symptoms reviewed above were negative except for none       Social History     Tobacco Use   Smoking Status Former   Smokeless Tobacco Never        Objective:     Physical Exam  Vitals and nursing note reviewed.   Constitutional:       Appearance: Normal appearance. He is well-developed.   HENT:      Head: Normocephalic and atraumatic.      Right Ear: External ear normal.      Left Ear: External ear normal.   Eyes:      Conjunctiva/sclera: Conjunctivae normal.      Left eye: Left conjunctiva is not injected. No hemorrhage.     Pupils: Pupils are equal, round, and reactive to light.   Neck:      Thyroid: No thyromegaly.      Vascular: No JVD.   Cardiovascular:      Rate and Rhythm: Normal rate and regular rhythm.      Chest Wall: PMI is not displaced.      Pulses: Intact distal pulses.           Carotid pulses are 2+ on the right side and 2+ on the left side.       Radial pulses are 2+ on the right side and 2+ on the left side.        Dorsalis pedis pulses are 2+ on the right side and 2+ on the left side.        Posterior tibial pulses are 2+ on the right side and 2+ on the left side.      Heart sounds: No midsystolic click and  "no opening snap. Murmur heard.      High-pitched blowing holosystolic murmur is present with a grade of 1/6 at the apex.      No friction rub. No gallop.   Pulmonary:      Effort: Pulmonary effort is normal. No respiratory distress.      Breath sounds: Normal breath sounds. No wheezing or rales.   Chest:      Chest wall: No tenderness.      Comments: Device pocket is in excellent repair.  Abdominal:      Palpations: Abdomen is soft. Abdomen is not rigid. There is no hepatomegaly.      Tenderness: There is no abdominal tenderness.   Musculoskeletal:         General: No tenderness. Normal range of motion.      Cervical back: Neck supple.      Right knee: No swelling.      Left knee: No swelling.      Right lower leg: No swelling.      Left lower leg: No swelling.      Right ankle: No swelling.      Left ankle: No swelling.      Right foot: No swelling.      Left foot: No swelling.   Skin:     General: Skin is warm and dry.      Findings: No rash.   Neurological:      Mental Status: He is alert and oriented to person, place, and time.      Cranial Nerves: No cranial nerve deficit.      Coordination: Coordination normal.      Deep Tendon Reflexes: Reflexes are normal and symmetric.   Psychiatric:         Behavior: Behavior normal.       /68   Pulse 75   Ht 5' 10" (1.778 m)   Wt 69.7 kg (153 lb 10.6 oz)   SpO2 (!) 94%   BMI 22.05 kg/m²       Results for orders placed during the hospital encounter of 12/19/22    Echo    Interpretation Summary  · The left ventricle is mildly enlarged with severely decreased systolic function.  · The estimated ejection fraction is 15 - 20%.  · Grade II left ventricular diastolic dysfunction.  · Moderate right ventricular enlargement with severely reduced right ventricular systolic function.  · Severe left atrial enlargement.  · Severe right atrial enlargement.  · There is a 23 mm Freire II porcine bioprosthetic aortic valve present. There is trivial central aortic insufficiency " present.  · The aortic valve mean gradient is 6 mmHg with a dimensionless index of 0.27.  · Moderate-to-severe mitral regurgitation.  · Moderate to severe tricuspid regurgitation.  · Moderate pulmonic regurgitation.  · The estimated PA systolic pressure is greater than 54 mmHg.  · There is pulmonary hypertension.    WBC   Date Value Ref Range Status   09/04/2023 3.57 (L) 3.90 - 12.70 K/uL Final     Hematocrit   Date Value Ref Range Status   09/04/2023 41.7 40.0 - 54.0 % Final     Hemoglobin   Date Value Ref Range Status   09/04/2023 13.8 (L) 14.0 - 18.0 g/dL Final     Lab Results   Component Value Date     (L) 09/04/2023     Lab Results   Component Value Date    CREATININE 1.3 09/04/2023    EGFRNORACEVR 56 (A) 09/04/2023    K 4.6 09/04/2023     Lab Results   Component Value Date    BNP 2,368 (H) 03/13/2023            reports current alcohol use.  Past Medical History:   Diagnosis Date    Aortic valve prosthesis present     CHF (congestive heart failure)     CKD (chronic kidney disease) stage 3, GFR 30-59 ml/min     Colon cancer     Hypertension     PAF (paroxysmal atrial fibrillation)      Past Surgical History:   Procedure Laterality Date    AORTIC VALVE REPLACEMENT  2016    COLON RESECTION      TRANSESOPHAGEAL ECHOCARDIOGRAM WITH POSSIBLE CARDIOVERSION (BROOKLYNN W/ POSS CARDIOVERSION) N/A 8/9/2023    Procedure: Transesophageal echo (BROOKLYNN) intra-procedure log documentation;  Surgeon: Tere Johns MD;  Location: [facility];  Service: Cardiology;  Laterality: N/A;     Family History   Problem Relation Age of Onset    Hypertension Mother     Colon cancer Father        Assessment:    As per usual, patient was very argumentative and this time was downright aggressive.  He took exception to having been on Eliquis and was blaming be for its side effects although I was not involved in the decision of starting the medication nor was  I privy to his complaints about its purported side effects.    1. Chronic  combined systolic and diastolic congestive heart failure    2. Typical atrial flutter    3. S/P AVR (aortic valve replacement)    4. RBBB    5. PVC's (premature ventricular contractions)    6. ICD (implantable cardioverter-defibrillator) in place    7. PAF (paroxysmal atrial fibrillation)    8. Primary hypertension    9. Stage 3a chronic kidney disease    10. DIVYA (obstructive sleep apnea)        Plan:    Unfortunately, I I felt bullied and disrespected enough to tell the patient stop his bad during or rales would simply walk out of the room.  He did stop although he made his displeasure about that quite clear.  I completed my evaluation of his medical issues and recommended that he not discontinue anticoagulation as he was eager to do.  A premature discontinuation OAC especially at this point -just 2 weeks after cardioversion- , would carry a high risk of thromboembolic events.   I have shared this negative interaction with the patient's referring physician (Dr. Jhons).      As to his management, he should continue with the current medications.  I discussed routine device follow up including quarterly to bi-annual device checks for device function as well as yearly follow up in the EP clinic. The patient  was advised to call with any concerns regarding their device. Device clinic follow up as scheduled.         No orders of the defined types were placed in this encounter.      No follow-ups on file.    There are no discontinued medications.         Medication List with Changes/Refills   Current Medications    CARVEDILOL (COREG) 12.5 MG TABLET    Take 25 mg by mouth 2 (two) times daily with meals.    COENZYME Q10 100 MG CAPSULE    Take 100 mg by mouth once daily.    DAPAGLIFLOZIN (FARXIGA) 10 MG TABLET    Take 10 mg by mouth once daily.    LORAZEPAM (ATIVAN) 0.5 MG TABLET    Take 1 mg by mouth 2 (two) times daily.    MOMETASONE 0.1% (ELOCON) 0.1 % CREAM    As needed cream    OLMESARTAN (BENICAR) 40 MG TABLET    Take  20 mg by mouth once daily.    RIVAROXABAN (XARELTO) 10 MG TAB    Take 2 tablets (20 mg total) by mouth daily with dinner or evening meal.    TORSEMIDE (DEMADEX) 20 MG TAB    Take 30 mg by mouth once daily.    TUMERIC-GING-OLIVE-OREG-CAPRYL 100 MG-150 MG- 50 MG-150 MG CAP    Take 1 capsule by mouth once daily.       This note is at least partially dictated using the M*Modal Fluency Direct word recognition program. There are word recognition mistakes that are occasionally missed on review.

## 2023-09-19 ENCOUNTER — OFFICE VISIT (OUTPATIENT)
Dept: CARDIOLOGY | Facility: CLINIC | Age: 83
End: 2023-09-19
Payer: MEDICARE

## 2023-09-19 VITALS
OXYGEN SATURATION: 99 % | HEART RATE: 63 BPM | DIASTOLIC BLOOD PRESSURE: 80 MMHG | WEIGHT: 152.13 LBS | SYSTOLIC BLOOD PRESSURE: 132 MMHG | BODY MASS INDEX: 21.78 KG/M2 | HEIGHT: 70 IN

## 2023-09-19 DIAGNOSIS — G47.33 OSA (OBSTRUCTIVE SLEEP APNEA): ICD-10-CM

## 2023-09-19 DIAGNOSIS — Z88.9 H/O MULTIPLE ALLERGIES: ICD-10-CM

## 2023-09-19 DIAGNOSIS — Z95.2 S/P AVR (AORTIC VALVE REPLACEMENT): ICD-10-CM

## 2023-09-19 DIAGNOSIS — I48.0 PAF (PAROXYSMAL ATRIAL FIBRILLATION): ICD-10-CM

## 2023-09-19 DIAGNOSIS — I49.3 PVC'S (PREMATURE VENTRICULAR CONTRACTIONS): ICD-10-CM

## 2023-09-19 DIAGNOSIS — I50.42 CHRONIC COMBINED SYSTOLIC AND DIASTOLIC CONGESTIVE HEART FAILURE: ICD-10-CM

## 2023-09-19 DIAGNOSIS — I48.3 TYPICAL ATRIAL FLUTTER: ICD-10-CM

## 2023-09-19 DIAGNOSIS — Z95.810 ICD (IMPLANTABLE CARDIOVERTER-DEFIBRILLATOR) IN PLACE: Primary | ICD-10-CM

## 2023-09-19 DIAGNOSIS — N18.31 STAGE 3A CHRONIC KIDNEY DISEASE: ICD-10-CM

## 2023-09-19 PROCEDURE — 99214 PR OFFICE/OUTPT VISIT, EST, LEVL IV, 30-39 MIN: ICD-10-PCS | Mod: S$PBB,,, | Performed by: STUDENT IN AN ORGANIZED HEALTH CARE EDUCATION/TRAINING PROGRAM

## 2023-09-19 PROCEDURE — 99999 PR PBB SHADOW E&M-EST. PATIENT-LVL III: CPT | Mod: PBBFAC,,, | Performed by: STUDENT IN AN ORGANIZED HEALTH CARE EDUCATION/TRAINING PROGRAM

## 2023-09-19 PROCEDURE — 99213 OFFICE O/P EST LOW 20 MIN: CPT | Mod: PBBFAC,PO | Performed by: STUDENT IN AN ORGANIZED HEALTH CARE EDUCATION/TRAINING PROGRAM

## 2023-09-19 PROCEDURE — 99999 PR PBB SHADOW E&M-EST. PATIENT-LVL III: ICD-10-PCS | Mod: PBBFAC,,, | Performed by: STUDENT IN AN ORGANIZED HEALTH CARE EDUCATION/TRAINING PROGRAM

## 2023-09-19 PROCEDURE — 99214 OFFICE O/P EST MOD 30 MIN: CPT | Mod: S$PBB,,, | Performed by: STUDENT IN AN ORGANIZED HEALTH CARE EDUCATION/TRAINING PROGRAM

## 2023-09-19 NOTE — PROGRESS NOTES
Section of Cardiology                  Cardiac Clinic Note    Chief Complaint/Reason for consultation:  Establish care      HPI:   Edmund Chu is a 82 y.o. male with h/o hypertension, CHF, AVR 2016 ([facility]), h/o colon cancerwho comes in to Cardiology Clinic to establish care.        7/27/2022  Per Care everywhere, last echocardiogram March 2021 with redo use LV function, right heart failure, moderate mitral regurgitation, normal functioning bioprosthetic aortic valve    Comes in with wife   Only coming in for ICD evaluation   Still seeing Dr. Viveros  Has had CHF since 2009- did not want ICD until now    Aortic valve replacement, no aspirin due to mild bruising     Reports heart issues since 2009- had colon cancer s/p resection     Walks regularly, 1.2 miles daily, worsening SOB  Recent orthopnea last few days  Reports leg swelling- recently needed to take torsemide which he doesn't take it regularly   He's a vegetarian, does not use salt  Trying to watch fluid intake     Family hx: cousin: ICD, afib   Stopped smoking at age of 30 yo. ETOH occ        8/31/22  BNP level was elevated, torsemide 100 mg daily increased to 200 mg daily at the time  Repeat BNP level was elevated, CMP showed stable renal function  Patient now back down to 100 mg   Dropped 6lbs since last visit  Reports daily weights, fluid and salt restriction  Has not taken Xarelto in fear of bleeding if he cuts himself  Blood pressure readings normotensive at home ranges a 110 to 135 systolic    Denies chest pain, shortness of breath, dizziness, palpitations, orthopnea, PND      10/7/22  Saw Dr. Wong, will be having ICD placement- but patient wants to wait  Reports rash/itching, unsure of which medication is causing it  Has seen dermatology  Switched torsemide to lasix due to itching, however, itching has not improved  BP elevated  Echo pending     Denies chest pain, shortness of breath, dizziness, palpitations, orthopnea,  PND      11/8/22  ICD implanted on 10/27/22  Reports cough and SOB, sputum production  Denies LE swelling, PND  Recently started back on diuretics, torsemide 20  No longer taking farxiga, spironolactone   Believes itching was due to wine, has since stopped   Did not see allergy/imm      11/22/22  Virtual visit   Concerned about his labs  Chest x-ray showing cardiac enlargement, vascular congestion, no pneumonia or pneumothorax  Labs show heart failure in congestion, BNP significantly elevated   Continues to have cough with sputum production  Reports shortness of breath with ambulation  Patient to take 2 tablets of torsemide   Will repeat labs in 1 week      12/12/22  Comes in with wife and daughter   Has h/o insomnia, but believes it's worsened after the ICD  Decided to switch from torsemide back to Lasix:  Thought torsemide was causing knee pain.  But switched back to torsemide 1.5 tablets daily  Recent BNP elevated 3,945   Repeat BNP pending today  Does not wear CPAP machine regularly  Still believes he has allergies to some medications        1/9/23  Still having issues sleeping  Taking medications for sleep  Feels better when he gets good sleep   Echo with EF 15-20%, mod-severe TR/MR (worsening from prior)  Taking 1.5 torsemide daily   SOB has improved  Restarted farxiga         4/17/23  Stopped some meds due to side effects  SOB much improved  Takes torsemide daily   Does not want to take xarelto   Walks 1.2 miles daily   Watches salt and fluid intake, weight self daily   He's vegetarian  7 lbs up since 3/23- has been eating more recently   Taking ativan for sleep       7/31/23  Reports for the last week, has been having shortness of breath  Has been eating pizza more often than normal  Has exertional shortness of breath and PND/orthopnea   Denies chest pain   Taking torsemide 1 tablet and should be taking 1.5 tablets  Watching fluid intake   Reports weight has increased a bit at home  144 lbs in 3/23->157 lbs  today        9/6/23  Comes in with daughter  Taking torsemide 1 tablet  Was admitted recently for SBO, patient thinks it was due to Xarelto  Stopped taking the xarelto 9/1/23  EKG prior to BROOKLYNN/cardioversion 8/9/2023 showed atrial flutter  Had BROOKLYNN/cardioversion, cardioverted to sinus tach  Denies shortness of breath, restarted walking  Recently seen Dr. Grider and requests to switch providers      9/19/23  Will going back to his old cardiologist due to location convenience   SOB stable, was able to do the Sick[employer] Cell walk this past weekend  Denies palpitations   Did not restart OAC   PT did not reach out to him for evaluation of shoulder pain      EKG 8/9/2023 atrial flutter  EKG 3/9/23 SB, RBBB, inferior infarct, TWI lateral leads   EKG 8/31/2022 NSR, RBBB, LAFB, PVCs,  ms   EKG 7/27/2022 NSR, RBBB, LAFB, inferior infarct, qtc 529 ms          Prior records  Stress test 12/2020 small reversible defect, apical segment.  Medium perfusion defect inferior wall  LHC 2/21:  Left main 20% stenosis   Lad:  Mid LAD 50% stenosis   Left circumflex: Diffuse irregularity  Mid RCA proximal portion 50% stenosis  Distal RCA 50% stenosis   Ramus diffuse irregularity  Echo 11/21 EF 30%, RV function is severely decreased          Echo 12/19/22  The left ventricle is mildly enlarged with severely decreased systolic function.  The estimated ejection fraction is 15 - 20%.  Grade II left ventricular diastolic dysfunction.  Moderate right ventricular enlargement with severely reduced right ventricular systolic function.  Severe left atrial enlargement.  Severe right atrial enlargement.  There is a 23 mm Freire II porcine bioprosthetic aortic valve present. There is trivial central aortic insufficiency present.  The aortic valve mean gradient is 6 mmHg with a dimensionless index of 0.27.  Moderate-to-severe mitral regurgitation.  Moderate to severe tricuspid regurgitation.  Moderate pulmonic regurgitation.  The estimated PA  systolic pressure is greater than 54 mmHg.  There is pulmonary hypertension.    Echo 10/22  Concentric hypertrophy and severely decreased systolic function.  Severe left atrial enlargement.  The estimated ejection fraction is 20%.  Left ventricular diastolic dysfunction.  Normal right ventricular size with normal right ventricular systolic function.  Mild tricuspid regurgitation.      ECHO  3/21 (Care everywhere)  CONCLUSIONS:   - Exam indication: Initial evaluation of known cardiomyopathy   - The left ventricle is mildly dilated. There is left ventricular hypertrophy.   Left ventricular systolic function is severely decreased. EF = 26 ± 5% (2D 4-ch.)   Definity contrast used for endocardial border detection.   - The right ventricle is dilated. Right ventricular systolic function is   moderately decreased.   - The left atrial cavity is severely dilated.   - The right atrial cavity is dilated.   - There is moderate (2+ - 3+) mitral valve regurgitation due to restricted leaflet    motion and apical tethering of normal mitral leaflet caused by LV enlargement.   Regurgitant orifice area (PISA) is 0.24 cm².   - Porcine prosthetic aortic valve. There is trace aortic valve regurgitation. The   peak gradient is 10 mmHg, the mean gradient is 5 mmHg and the dimensionless valve   index is 0.31.   - Estimated right ventricular systolic pressure is 42 mmHg plus right atrial   pressure. Estimated right atrial pressure is not included as the IVC was not seen.   - The patient has not had a prior CC echocardiographic exam for comparison.       STRESS TEST    [facility]      ROS: All 10 systems reviewed. Please refer to the HPI for pertinent positives. All other systems negative.     Past Medical History  Past Medical History:   Diagnosis Date    Aortic valve prosthesis present     CHF (congestive heart failure)     CKD (chronic kidney disease) stage 3, GFR 30-59 ml/min     Colon cancer     Hypertension     PAF (paroxysmal atrial  fibrillation)        Surgical History  Past Surgical History:   Procedure Laterality Date    AORTIC VALVE REPLACEMENT  2016    COLON RESECTION      TRANSESOPHAGEAL ECHOCARDIOGRAM WITH POSSIBLE CARDIOVERSION (BROOKLYNN W/ POSS CARDIOVERSION) N/A 8/9/2023    Procedure: Transesophageal echo (BROOKLYNN) intra-procedure log documentation;  Surgeon: Tere Johns MD;  Location: [facility] CATH LAB;  Service: Cardiology;  Laterality: N/A;          Allergies:   Review of patient's allergies indicates:   Allergen Reactions    Eliquis [apixaban] Swelling    Sacubitril-valsartan Hives and Swelling    Amlodipine Edema     Ankle    Lisinopril Other (See Comments)    Mefloquine Hives and Itching     Anti malarial      Spironolactone Edema       Social History:  Social History     Socioeconomic History    Marital status:    Tobacco Use    Smoking status: Former    Smokeless tobacco: Never   Substance and Sexual Activity    Alcohol use: Yes    Drug use: Never       Family History:  family history includes Colon cancer in his father; Hypertension in his mother.    Home Medications:  Current Outpatient Medications on File Prior to Visit   Medication Sig Dispense Refill    carvediloL (COREG) 12.5 MG tablet Take 25 mg by mouth 2 (two) times daily with meals.      coenzyme Q10 100 mg capsule Take 100 mg by mouth once daily.      dapagliflozin (FARXIGA) 10 mg tablet Take 10 mg by mouth once daily.      LORazepam (ATIVAN) 0.5 MG tablet Take 1 mg by mouth 2 (two) times daily.      mometasone 0.1% (ELOCON) 0.1 % cream As needed cream      olmesartan (BENICAR) 40 MG tablet Take 20 mg by mouth once daily.      torsemide (DEMADEX) 20 MG Tab Take 30 mg by mouth once daily.      tumeric-ging-olive-oreg-capryl 100 mg-150 mg- 50 mg-150 mg Cap Take 1 capsule by mouth once daily.      rivaroxaban (XARELTO) 10 mg Tab Take 2 tablets (20 mg total) by mouth daily with dinner or evening meal. (Patient not taking: Reported on 9/19/2023) 30 tablet 6     No  "current facility-administered medications on file prior to visit.       Physical exam:  /80 (BP Location: Right arm, Patient Position: Sitting, BP Method: Medium (Manual))   Pulse 63   Ht 5' 10" (1.778 m)   Wt 69 kg (152 lb 1.9 oz)   SpO2 99%   BMI 21.83 kg/m²         General: Pt is a 82 y.o. year old male who is AAOx3, in NAD, is pleasant, well nourished, looks stated age  HEENT: PERRL, EOMI, Oral mucosa pink & moist  CVS  No abnormal cardiac pulsations noted on inspection. The apical impulse is normal on palpation, and is located in the left 5th intercostal space in the mid - clavicular line. No palpable thrills or abnormal pulsations noted. RR, S1 - S2 heard, no murmurs, rubs or gallops appreciated.   PUL :  NO wheezes, rales, crackles   ABD : BS +, soft. No tenderness elicited   LE : No C/C/E. Distal Pulses palpable B/L           LABS:    Chemistry:   Lab Results   Component Value Date     09/04/2023    K 4.6 09/04/2023     09/04/2023    CO2 23 09/04/2023    BUN 26 (H) 09/04/2023    CREATININE 1.3 09/04/2023    CALCIUM 9.5 09/04/2023     Cardiac Markers: No results found for: "CKTOTAL", "CKMB", "CKMBINDEX", "TROPONINI"  Cardiac Markers (Last 3): No results found for: "CKTOTAL", "CKMB", "CKMBINDEX", "TROPONINI"  CBC:   Lab Results   Component Value Date    WBC 3.57 (L) 09/04/2023    HGB 13.8 (L) 09/04/2023    HCT 41.7 09/04/2023    MCV 96 09/04/2023     (L) 09/04/2023     Lipids: No results found for: "CHOL", "TRIG", "HDL", "LDLDIRECT"  Coagulation:   Lab Results   Component Value Date    INR 1.3 (H) 09/03/2023    APTT 31.8 09/03/2023           Assessment      1. ICD (implantable cardioverter-defibrillator) in place    2. S/P AVR (aortic valve replacement)    3. Chronic combined systolic and diastolic congestive heart failure    4. PVC's (premature ventricular contractions)    5. DIVYA (obstructive sleep apnea)    6. Typical atrial flutter    7. Stage 3a chronic kidney disease    8. " H/O multiple allergies    9. PAF (paroxysmal atrial fibrillation)            Plan:    Shortness of breath/cough - stable   Continue torsemide 1 tablet mg daily (increase to 1.5 tablets if symptoms worsen)    Congestive heart failure s/p ICD  NYHA class III, stage C  Systolic heart failure with right ventricular failure  Continue carvedilol, Benicar  Stopped Farxiga, spironolactone due side effects   Stopped entresto - due to swelling (has allergy)  Taking Torsemide 20 mg     Aortic valve replacement  Echo 12/22 EF 15-20%, valve looks to be functioning properly, severely reduced RV function, mod-severe MR/TR, mod PI, biatrial enlargement     PAF/aflutter  Stopped eliquis, Xarelto due side effects  Consider EP for ablation if willing to restart xarelto   Continue coreg     Hypertension  Stable     Shoulder pain  Reports shoulder pain since ICD was placed on last year   PT did not reach out to him    This note was prepared using voice recognition system and is likely to have sound alike errors that may have been overlooked even after proofreading.     I have reviewed all pertinent chart information.  Plans and recommendations have been formulated under my direct supervision. All questions answered and patient voiced understanding.   If symptoms persist go to the ED.    RTC prn         Tere Johns MD  Cardiology

## 2023-10-26 ENCOUNTER — CLINICAL SUPPORT (OUTPATIENT)
Dept: CARDIOLOGY | Facility: HOSPITAL | Age: 83
End: 2023-10-26
Payer: MEDICARE

## 2023-10-26 DIAGNOSIS — Z95.810 PRESENCE OF AUTOMATIC (IMPLANTABLE) CARDIAC DEFIBRILLATOR: ICD-10-CM

## 2023-12-09 ENCOUNTER — CLINICAL SUPPORT (OUTPATIENT)
Dept: CARDIOLOGY | Facility: HOSPITAL | Age: 83
End: 2023-12-09
Payer: MEDICARE

## 2023-12-09 DIAGNOSIS — Z95.810 PRESENCE OF AUTOMATIC (IMPLANTABLE) CARDIAC DEFIBRILLATOR: ICD-10-CM

## 2023-12-19 ENCOUNTER — TELEPHONE (OUTPATIENT)
Dept: CARDIOLOGY | Facility: CLINIC | Age: 83
End: 2023-12-19
Payer: MEDICARE

## 2023-12-19 NOTE — TELEPHONE ENCOUNTER
Patient called and asked if he had a cardioversion done.I confirmed that It was done on 8/9/23. NOLAN

## 2024-01-08 ENCOUNTER — TELEPHONE (OUTPATIENT)
Dept: CARDIOLOGY | Facility: HOSPITAL | Age: 84
End: 2024-01-08
Payer: MEDICARE

## 2024-01-08 NOTE — TELEPHONE ENCOUNTER
Pt has transferred care to Dr. ANA PAULA Sotomayor at [facility].  He wishes to cancel all upcoming cardiology/arrhythmia appointments.  He will contact new provider so they can enroll him in home monitoring.  He is asking that we continue to monitor until their office reaches out to device clinic.

## 2024-01-08 NOTE — TELEPHONE ENCOUNTER
----- Message from Ángel Collazo MA sent at 1/8/2024  1:36 PM CST -----  Regarding: MRN# [id number]  Contact: pt  Please advise. Thanks   ----- Message -----  From: Moises Ramirez  Sent: 1/8/2024   9:27 AM CST  To: Andreas PIERSON Staff    Pt is calling rg no longer being with the clinic and wants to know who is monitoring his defibulator /wants to cancel his upcoming appt due to no longer being with the clinic/ the apt on 01/242024/pt can be reached at [phone] & pt [email or web address]

## 2024-01-10 ENCOUNTER — TELEPHONE (OUTPATIENT)
Dept: CARDIOLOGY | Facility: CLINIC | Age: 84
End: 2024-01-10
Payer: MEDICARE

## 2024-01-10 NOTE — TELEPHONE ENCOUNTER
----- Message from Ronnie Kee sent at 1/10/2024  2:29 PM CST -----  Contact: Edmund  .Type:  Patient Returning Call    Who Called:Edmund  Who Left Message for Patient:nurse  Does the patient know what this is regarding?:yes  Would the patient rather a call back or a response via [employer]? call back  Best Call Back Number:.[phone]    Additional Information: na    Thanks  TRENTON

## 2024-01-10 NOTE — TELEPHONE ENCOUNTER
Left a message to contact office pb----- Message from Prudence Vincent sent at 1/10/2024  1:45 PM CST -----  Contact: tamara Taylor is calling regarding medical equipment questions.  Please give him a call back at [phone]

## 2024-01-12 ENCOUNTER — CLINICAL SUPPORT (OUTPATIENT)
Dept: CARDIOLOGY | Facility: HOSPITAL | Age: 84
End: 2024-01-12

## 2024-01-12 ENCOUNTER — TELEPHONE (OUTPATIENT)
Dept: CARDIOLOGY | Facility: HOSPITAL | Age: 84
End: 2024-01-12
Payer: MEDICARE

## 2024-01-12 DIAGNOSIS — Z95.810 PRESENCE OF AUTOMATIC (IMPLANTABLE) CARDIAC DEFIBRILLATOR: ICD-10-CM

## 2024-01-12 NOTE — TELEPHONE ENCOUNTER
Returned call to [facility], the patient has been released from [facility] home monitoring and is now available to be enrolled in their clinic.

## 2024-01-12 NOTE — TELEPHONE ENCOUNTER
----- Message from Blanka Santo MA sent at 1/12/2024  2:01 PM CST -----  Regarding: FW: release    ----- Message -----  From: Amy Boudreaux  Sent: 1/12/2024   1:54 PM CST  To: Andreas PIERSON Staff  Subject: release                                          Name of who is calling:   Rebekah / [facility]      What is the request in detail: Requesting a call back in ref to pt device checks are going to be released in [employer]       Can the clinic reply by [facility]:no      What number to call back if not [facility]:[phone]

## (undated) DEVICE — SUT 4/0 18IN COATED VICRYL

## (undated) DEVICE — PAD GROUNDING DISPER ELECTRODE

## (undated) DEVICE — BLADE PLASMA WIDE SPATULA TIP

## (undated) DEVICE — SYR BULB 60CC IRRIGATION

## (undated) DEVICE — SUT SILK 2-0 PS 18IN BLACK

## (undated) DEVICE — SHEATH SAFE ULTRA 10FRX13CM

## (undated) DEVICE — GUIDE WIRE J-TIP 260CM .025

## (undated) DEVICE — CAUTERY BOVIE PENCIL

## (undated) DEVICE — PAD RADIOLUCENT STAT ADULT

## (undated) DEVICE — SCALPEL SZ 10 RETRACTABLE

## (undated) DEVICE — SUT 3-0 VICRYL / SH (J416)

## (undated) DEVICE — DRESSING ADH COVADERM PLUS 4X4

## (undated) DEVICE — DRESSING AQUACEL AG ADV 3.5X6

## (undated) DEVICE — NDL PERCUTANEOUS 21G 7CM VASC

## (undated) DEVICE — PACK PACER PERMANENT

## (undated) DEVICE — GUIDEWIRE WHOLEY HI TORQ 175CM

## (undated) DEVICE — PAD DEFIB CADENCE ADULT R2